# Patient Record
Sex: FEMALE | Race: OTHER | NOT HISPANIC OR LATINO | URBAN - METROPOLITAN AREA
[De-identification: names, ages, dates, MRNs, and addresses within clinical notes are randomized per-mention and may not be internally consistent; named-entity substitution may affect disease eponyms.]

---

## 2021-11-27 ENCOUNTER — EMERGENCY (EMERGENCY)
Facility: HOSPITAL | Age: 26
LOS: 1 days | Discharge: HOME | End: 2021-11-27
Attending: EMERGENCY MEDICINE
Payer: SELF-PAY

## 2021-11-27 PROCEDURE — 99053 MED SERV 10PM-8AM 24 HR FAC: CPT

## 2021-11-27 PROCEDURE — 99285 EMERGENCY DEPT VISIT HI MDM: CPT

## 2021-11-28 VITALS
DIASTOLIC BLOOD PRESSURE: 73 MMHG | OXYGEN SATURATION: 100 % | TEMPERATURE: 98 F | HEART RATE: 90 BPM | RESPIRATION RATE: 17 BRPM | SYSTOLIC BLOOD PRESSURE: 110 MMHG

## 2021-11-28 VITALS
DIASTOLIC BLOOD PRESSURE: 81 MMHG | WEIGHT: 108.03 LBS | TEMPERATURE: 99 F | OXYGEN SATURATION: 100 % | HEIGHT: 56 IN | RESPIRATION RATE: 16 BRPM | HEART RATE: 127 BPM | SYSTOLIC BLOOD PRESSURE: 121 MMHG

## 2021-11-28 LAB
ALBUMIN SERPL ELPH-MCNC: 3.3 G/DL — LOW (ref 3.5–5)
ALP SERPL-CCNC: 78 U/L — SIGNIFICANT CHANGE UP (ref 40–120)
ALT FLD-CCNC: 22 U/L DA — SIGNIFICANT CHANGE UP (ref 10–60)
ANION GAP SERPL CALC-SCNC: 6 MMOL/L — SIGNIFICANT CHANGE UP (ref 5–17)
AST SERPL-CCNC: 17 U/L — SIGNIFICANT CHANGE UP (ref 10–40)
BASOPHILS # BLD AUTO: 0.1 K/UL — SIGNIFICANT CHANGE UP (ref 0–0.2)
BASOPHILS NFR BLD AUTO: 0.4 % — SIGNIFICANT CHANGE UP (ref 0–2)
BILIRUB SERPL-MCNC: 0.2 MG/DL — SIGNIFICANT CHANGE UP (ref 0.2–1.2)
BUN SERPL-MCNC: 21 MG/DL — HIGH (ref 7–18)
CALCIUM SERPL-MCNC: 8.8 MG/DL — SIGNIFICANT CHANGE UP (ref 8.4–10.5)
CHLORIDE SERPL-SCNC: 109 MMOL/L — HIGH (ref 96–108)
CO2 SERPL-SCNC: 25 MMOL/L — SIGNIFICANT CHANGE UP (ref 22–31)
CREAT SERPL-MCNC: 0.72 MG/DL — SIGNIFICANT CHANGE UP (ref 0.5–1.3)
EOSINOPHIL # BLD AUTO: 0.04 K/UL — SIGNIFICANT CHANGE UP (ref 0–0.5)
EOSINOPHIL NFR BLD AUTO: 0.2 % — SIGNIFICANT CHANGE UP (ref 0–6)
GLUCOSE SERPL-MCNC: 115 MG/DL — HIGH (ref 70–99)
HCG SERPL-ACNC: <1 MIU/ML — SIGNIFICANT CHANGE UP
HCT VFR BLD CALC: 35.8 % — SIGNIFICANT CHANGE UP (ref 34.5–45)
HGB BLD-MCNC: 11.6 G/DL — SIGNIFICANT CHANGE UP (ref 11.5–15.5)
IMM GRANULOCYTES NFR BLD AUTO: 0.6 % — SIGNIFICANT CHANGE UP (ref 0–1.5)
LACTATE SERPL-SCNC: 0.8 MMOL/L — SIGNIFICANT CHANGE UP (ref 0.7–2)
LIDOCAIN IGE QN: 64 U/L — LOW (ref 73–393)
LYMPHOCYTES # BLD AUTO: 1.31 K/UL — SIGNIFICANT CHANGE UP (ref 1–3.3)
LYMPHOCYTES # BLD AUTO: 5.9 % — LOW (ref 13–44)
MCHC RBC-ENTMCNC: 25.2 PG — LOW (ref 27–34)
MCHC RBC-ENTMCNC: 32.4 GM/DL — SIGNIFICANT CHANGE UP (ref 32–36)
MCV RBC AUTO: 77.8 FL — LOW (ref 80–100)
MONOCYTES # BLD AUTO: 0.68 K/UL — SIGNIFICANT CHANGE UP (ref 0–0.9)
MONOCYTES NFR BLD AUTO: 3.1 % — SIGNIFICANT CHANGE UP (ref 2–14)
NEUTROPHILS # BLD AUTO: 19.99 K/UL — HIGH (ref 1.8–7.4)
NEUTROPHILS NFR BLD AUTO: 89.8 % — HIGH (ref 43–77)
NRBC # BLD: 0 /100 WBCS — SIGNIFICANT CHANGE UP (ref 0–0)
PLATELET # BLD AUTO: 375 K/UL — SIGNIFICANT CHANGE UP (ref 150–400)
POTASSIUM SERPL-MCNC: 3.7 MMOL/L — SIGNIFICANT CHANGE UP (ref 3.5–5.3)
POTASSIUM SERPL-SCNC: 3.7 MMOL/L — SIGNIFICANT CHANGE UP (ref 3.5–5.3)
PROT SERPL-MCNC: 7.9 G/DL — SIGNIFICANT CHANGE UP (ref 6–8.3)
RBC # BLD: 4.6 M/UL — SIGNIFICANT CHANGE UP (ref 3.8–5.2)
RBC # FLD: 13.8 % — SIGNIFICANT CHANGE UP (ref 10.3–14.5)
SODIUM SERPL-SCNC: 140 MMOL/L — SIGNIFICANT CHANGE UP (ref 135–145)
WBC # BLD: 22.25 K/UL — HIGH (ref 3.8–10.5)
WBC # FLD AUTO: 22.25 K/UL — HIGH (ref 3.8–10.5)

## 2021-11-28 PROCEDURE — 74177 CT ABD & PELVIS W/CONTRAST: CPT | Mod: MA

## 2021-11-28 PROCEDURE — 36415 COLL VENOUS BLD VENIPUNCTURE: CPT

## 2021-11-28 PROCEDURE — 74177 CT ABD & PELVIS W/CONTRAST: CPT | Mod: 26,MA

## 2021-11-28 PROCEDURE — 83690 ASSAY OF LIPASE: CPT

## 2021-11-28 PROCEDURE — 84702 CHORIONIC GONADOTROPIN TEST: CPT

## 2021-11-28 PROCEDURE — 99284 EMERGENCY DEPT VISIT MOD MDM: CPT | Mod: 25

## 2021-11-28 PROCEDURE — 96374 THER/PROPH/DIAG INJ IV PUSH: CPT | Mod: XU

## 2021-11-28 PROCEDURE — 96375 TX/PRO/DX INJ NEW DRUG ADDON: CPT

## 2021-11-28 PROCEDURE — 80053 COMPREHEN METABOLIC PANEL: CPT

## 2021-11-28 PROCEDURE — 85025 COMPLETE CBC W/AUTO DIFF WBC: CPT

## 2021-11-28 PROCEDURE — 83605 ASSAY OF LACTIC ACID: CPT

## 2021-11-28 RX ORDER — SODIUM CHLORIDE 9 MG/ML
1000 INJECTION INTRAMUSCULAR; INTRAVENOUS; SUBCUTANEOUS ONCE
Refills: 0 | Status: COMPLETED | OUTPATIENT
Start: 2021-11-28 | End: 2021-11-28

## 2021-11-28 RX ORDER — FAMOTIDINE 10 MG/ML
20 INJECTION INTRAVENOUS ONCE
Refills: 0 | Status: COMPLETED | OUTPATIENT
Start: 2021-11-28 | End: 2021-11-28

## 2021-11-28 RX ORDER — KETOROLAC TROMETHAMINE 30 MG/ML
15 SYRINGE (ML) INJECTION ONCE
Refills: 0 | Status: DISCONTINUED | OUTPATIENT
Start: 2021-11-28 | End: 2021-11-28

## 2021-11-28 RX ORDER — ONDANSETRON 8 MG/1
4 TABLET, FILM COATED ORAL ONCE
Refills: 0 | Status: COMPLETED | OUTPATIENT
Start: 2021-11-28 | End: 2021-11-28

## 2021-11-28 RX ADMIN — Medication 15 MILLIGRAM(S): at 00:50

## 2021-11-28 RX ADMIN — FAMOTIDINE 20 MILLIGRAM(S): 10 INJECTION INTRAVENOUS at 00:51

## 2021-11-28 RX ADMIN — Medication 30 MILLILITER(S): at 00:50

## 2021-11-28 RX ADMIN — Medication 15 MILLIGRAM(S): at 00:51

## 2021-11-28 RX ADMIN — ONDANSETRON 4 MILLIGRAM(S): 8 TABLET, FILM COATED ORAL at 00:51

## 2021-11-28 RX ADMIN — SODIUM CHLORIDE 1000 MILLILITER(S): 9 INJECTION INTRAMUSCULAR; INTRAVENOUS; SUBCUTANEOUS at 00:51

## 2021-11-28 NOTE — ED PROVIDER NOTE - CROS ED NEURO ALL NEG
- - - O-L Flap Text: The defect edges were debeveled with a #15 scalpel blade.  Given the location of the defect, shape of the defect and the proximity to free margins an O-L flap was deemed most appropriate.  Using a sterile surgical marker, an appropriate advancement flap was drawn incorporating the defect and placing the expected incisions within the relaxed skin tension lines where possible.    The area thus outlined was incised deep to adipose tissue with a #15 scalpel blade.  The skin margins were undermined to an appropriate distance in all directions utilizing iris scissors.

## 2021-11-28 NOTE — ED PROVIDER NOTE - PROGRESS NOTE DETAILS
courtney: improve. lab elevated wbc - ct ordered- no acute findings. toleratign po now   dx gastroenteritis. slowly advance diet. replace loss with gatorade/coconut water. left ambulatory.  return precautions given.

## 2021-11-28 NOTE — ED PROVIDER NOTE - OBJECTIVE STATEMENT
26 year old female with no significant PMHx or PSHx presents to the ED with complaints of diffuse abdominal pain, nausea, vomiting, and diarrhea after having some leftover food tonight. Patient endorses that the food was very spicy. Patient endorses developing a headache after vomiting. Patient otherwise denies all other acute complaints including any visual changes, focal weakness, dysuria, or fevers.   Allergies: Tylenol (palpitations)

## 2021-11-28 NOTE — ED PROVIDER NOTE - PATIENT PORTAL LINK FT
You can access the FollowMyHealth Patient Portal offered by [facility] by registering at the following website: [email or web address]. By joining [employer]’s FollowMyHealth portal, you will also be able to view your health information using other applications (apps) compatible with our system.

## 2022-02-23 ENCOUNTER — EMERGENCY (EMERGENCY)
Facility: HOSPITAL | Age: 27
LOS: 1 days | Discharge: HOME | End: 2022-02-23
Attending: EMERGENCY MEDICINE
Payer: MEDICAID

## 2022-02-23 VITALS
OXYGEN SATURATION: 100 % | HEART RATE: 74 BPM | DIASTOLIC BLOOD PRESSURE: 61 MMHG | WEIGHT: 109.57 LBS | TEMPERATURE: 98 F | HEIGHT: 56 IN | RESPIRATION RATE: 18 BRPM | SYSTOLIC BLOOD PRESSURE: 112 MMHG

## 2022-02-23 PROCEDURE — 99283 EMERGENCY DEPT VISIT LOW MDM: CPT

## 2022-02-23 PROCEDURE — 99282 EMERGENCY DEPT VISIT SF MDM: CPT

## 2022-02-23 RX ORDER — HYDROCORTISONE 1 %
1 OINTMENT (GRAM) TOPICAL
Qty: 33 | Refills: 0
Start: 2022-02-23

## 2022-02-23 NOTE — ED PROVIDER NOTE - CARE PROVIDERS DIRECT ADDRESSES
[email or web address]

## 2022-02-23 NOTE — ED PROVIDER NOTE - NSFOLLOWUPINSTRUCTIONS_ED_ALL_ED_FT
Please follow up with a dermatologist in 1-2 days.  Please use the ointment as prescribed.  Please return to the emergency department if you have vomiting, fever, shortness of breath, or any other symptoms.      Acute Rash    WHAT YOU NEED TO KNOW:    A rash is irritated, red, or itchy skin or mucus membranes, such as the lining of your nose or throat. Acute means the rash starts suddenly, worsens quickly, and lasts a short time. Common causes include a disease or infection, a reaction to something you are allergic to, or certain medicines.    DISCHARGE INSTRUCTIONS:    Return to the emergency department if:   •You have sudden trouble breathing or chest pain.  •You are vomiting, have a headache or muscle aches, and your throat hurts.    Call your doctor or dermatologist if:   •You have a fever.  •You get open wounds from scratching your skin, or you have a wound that is red, swollen, or painful.  •Your rash lasts longer than 3 months.  •You have swelling or pain in your joints.  •You have questions or concerns about your condition or care.    Medicines: If your rash does not go away on its own, you may need the following medicines:  •Antihistamines may be given to help decrease itching.  •Steroids may be given to decrease inflammation.  •Antibiotics help fight or prevent a bacterial infection.  •Take your medicine as directed. Contact your healthcare provider if you think your medicine is not helping or if you have side effects. Tell him of her if you are allergic to any medicine. Keep a list of the medicines, vitamins, and herbs you take. Include the amounts, and when and why you take them. Bring the list or the pill bottles to follow-up visits. Carry your medicine list with you in case of an emergency.    Prevent a rash or care for your skin when you have a rash: Dry skin can lead to more problems. Do not scratch your skin if it itches. You may cause a skin infection by scratching. The following may prevent dry skin, and help your skin look better:  •Help soothe your rash. Apply thick cream lotions or petroleum jelly. Cool compresses may also soothe your skin. Apply a cool compress or a cool, wet towel, and then cover it with a dry towel.  •Use lukewarm water when you bathe. Hot water may damage your skin more. Pat your skin dry. Do not rub your skin with a towel.  •Use detergents, soaps, shampoos, and bubble baths made for sensitive skin.  •Wear clothes made of cotton instead of nylon or wool. Cotton is softer, so it will not hurt your skin as much.    Follow up with your healthcare providers as directed: A dermatologist may help find the cause of your rash or help plan or change treatment. A dietitian may help with meal planning if you have a food allergy. Write down your questions so you remember to ask them during your visits.

## 2022-02-23 NOTE — ED PROVIDER NOTE - OBJECTIVE STATEMENT
26yoF prev healthy presents with generalized itchy rash to body x 1 wk. Tried OTC clotrimazole on her own decision, without improvement. Has been by boyfriend a few days prior to onset and has cat, slept in same bed x 1. Boyfriend does not have rash. Denies all other symptoms including ST, vaginal discharge, urinary symptoms, fever, cough, SOB. Denies other new exposures/detergents.

## 2022-02-23 NOTE — ED PROVIDER NOTE - PATIENT PORTAL LINK FT
You can access the FollowMyHealth Patient Portal offered by [facility] by registering at the following website: [email or web address]. By joining [employer]’s FollowMyHealth portal, you will also be able to view your health information using other applications (apps) compatible with our system.

## 2022-02-23 NOTE — ED PROVIDER NOTE - CARE PROVIDER_API CALL
Margaret Rivera)  Dermatology [facility]  [address] [place] [address]  Phone: [phone]  Fax: [phone]  Follow Up Time: 1-3 Days

## 2022-02-23 NOTE — ED PROVIDER NOTE - CLINICAL SUMMARY MEDICAL DECISION MAKING FREE TEXT BOX
Appearance low suspicion for bedbugs or scabies. Low suspicion for sexual or  etiology. Appearance c/w dermatitis. Patient is well appearing, NAD, afebrile, hemodynamically stable. Discharged with rx hydrocortisone, instructions in further symptomatic care, return precautions, and need for derm f/u.

## 2022-02-27 ENCOUNTER — EMERGENCY (EMERGENCY)
Facility: HOSPITAL | Age: 27
LOS: 1 days | Discharge: HOME | End: 2022-02-27
Attending: EMERGENCY MEDICINE
Payer: MEDICAID

## 2022-02-27 VITALS
SYSTOLIC BLOOD PRESSURE: 122 MMHG | TEMPERATURE: 98 F | OXYGEN SATURATION: 100 % | DIASTOLIC BLOOD PRESSURE: 77 MMHG | HEART RATE: 88 BPM | RESPIRATION RATE: 19 BRPM | WEIGHT: 108.91 LBS | HEIGHT: 56 IN

## 2022-02-27 PROCEDURE — 99283 EMERGENCY DEPT VISIT LOW MDM: CPT

## 2022-02-27 NOTE — ED PROVIDER NOTE - PATIENT PORTAL LINK FT
You can access the FollowMyHealth Patient Portal offered by [facility] by registering at the following website: [email or web address]. By joining [employer]’s FollowMyHealth portal, you will also be able to view your health information using other applications (apps) compatible with our system.

## 2022-02-27 NOTE — ED PROVIDER NOTE - PHYSICAL EXAMINATION
Afebrile, hemodynamically stable, saturating well  NAD, well appearing, sitting comfortably in chair, no WOB, speaking full sentences  Head NCAT  EOMI grossly  Breathing comfortably on RA  AAO, CN's 3-12 grossly intact  PONCE spontaneously, no leg cyanosis or edema  Skin warm, dry, slight punctate raised papular skin-color irritation to skin with excoriations to arms and legs

## 2022-02-27 NOTE — ED PROVIDER NOTE - CLINICAL SUMMARY MEDICAL DECISION MAKING FREE TEXT BOX
No fever or systemic signs. No e/o cellulitis. Appearance c/w dermatitis and improves with topical steroids. Will increase to more potent steroid and pt with take PO steroid prn only if does not improve. Patient is well appearing, NAD, afebrile, hemodynamically stable. Discharged with instructions in further symptomatic care, return precautions, and need for derm f/u.

## 2022-02-27 NOTE — ED ADULT NURSE NOTE - NSIMPLEMENTINTERV_GEN_ALL_ED
Implemented All Universal Safety Interventions:  [place] to call system. Call bell, personal items and telephone within reach. Instruct patient to call for assistance. Room bathroom lighting operational. Non-slip footwear when patient is off stretcher. Physically safe environment: no spills, clutter or unnecessary equipment. Stretcher in lowest position, wheels locked, appropriate side rails in place.

## 2022-02-27 NOTE — ED ADULT NURSE NOTE - CHIEF COMPLAINT QUOTE
BUMPS AND DISCOLORATION TO BODY FOR 2 WEEKS WAS HERE ON WEDNESDAY SEEN AND DISCHARGE WITH CREAM AS PER PATIENT NO RELIEF, RECENTLY RECEIVED 2 DOSE PFIZER

## 2022-02-27 NOTE — ED PROVIDER NOTE - NSFOLLOWUPINSTRUCTIONS_ED_ALL_ED_FT
Please follow up with your primary care doctor in 1-2 days.  Please use the triamcinolone as needed for rash, and the prednisone by mouth only if the ointment is not helping.  Please return to the emergency department if you have severe worsening rash, fever, dizziness, shortness of breath, or any other symptoms.    Acute Rash    WHAT YOU NEED TO KNOW:    A rash is irritated, red, or itchy skin or mucus membranes, such as the lining of your nose or throat. Acute means the rash starts suddenly, worsens quickly, and lasts a short time. Common causes include a disease or infection, a reaction to something you are allergic to, or certain medicines.    DISCHARGE INSTRUCTIONS:    Return to the emergency department if:   •You have sudden trouble breathing or chest pain.  •You are vomiting, have a headache or muscle aches, and your throat hurts.    Call your doctor or dermatologist if:   •You have a fever.  •You get open wounds from scratching your skin, or you have a wound that is red, swollen, or painful.  •Your rash lasts longer than 3 months.  •You have swelling or pain in your joints.  •You have questions or concerns about your condition or care.    Medicines: If your rash does not go away on its own, you may need the following medicines:  •Antihistamines may be given to help decrease itching.  •Steroids may be given to decrease inflammation.  •Antibiotics help fight or prevent a bacterial infection.  •Take your medicine as directed. Contact your healthcare provider if you think your medicine is not helping or if you have side effects. Tell him of her if you are allergic to any medicine. Keep a list of the medicines, vitamins, and herbs you take. Include the amounts, and when and why you take them. Bring the list or the pill bottles to follow-up visits. Carry your medicine list with you in case of an emergency.    Prevent a rash or care for your skin when you have a rash: Dry skin can lead to more problems. Do not scratch your skin if it itches. You may cause a skin infection by scratching. The following may prevent dry skin, and help your skin look better:  •Help soothe your rash. Apply thick cream lotions or petroleum jelly. Cool compresses may also soothe your skin. Apply a cool compress or a cool, wet towel, and then cover it with a dry towel.  •Use lukewarm water when you bathe. Hot water may damage your skin more. Pat your skin dry. Do not rub your skin with a towel.  •Use detergents, soaps, shampoos, and bubble baths made for sensitive skin.  •Wear clothes made of cotton instead of nylon or wool. Cotton is softer, so it will not hurt your skin as much.    Follow up with your healthcare providers as directed: A dermatologist may help find the cause of your rash or help plan or change treatment. A dietitian may help with meal planning if you have a food allergy. Write down your questions so you remember to ask them during your visits.

## 2022-02-27 NOTE — ED PROVIDER NOTE - OBJECTIVE STATEMENT
26yoF prev healthy presents with generalized itchy rash to body x 1.5 wks. Tried OTC clotrimazole on her own decision, without improvement, then 3 days of hydrocortisone with improvement when it is placed on but then recurds. Has been by boyfriend a few days prior to onset and has cat, slept in same bed x 1. Boyfriend does not have rash. Denies all other symptoms including ST, vaginal discharge, urinary symptoms, fever, cough, SOB. Denies other new exposures/detergents.

## 2022-03-09 ENCOUNTER — EMERGENCY (EMERGENCY)
Facility: HOSPITAL | Age: 27
LOS: 1 days | Discharge: HOME | End: 2022-03-09
Attending: EMERGENCY MEDICINE
Payer: MEDICAID

## 2022-03-09 VITALS
SYSTOLIC BLOOD PRESSURE: 118 MMHG | RESPIRATION RATE: 20 BRPM | HEART RATE: 98 BPM | HEIGHT: 56 IN | WEIGHT: 108.91 LBS | OXYGEN SATURATION: 100 % | DIASTOLIC BLOOD PRESSURE: 73 MMHG | TEMPERATURE: 98 F

## 2022-03-09 VITALS — SYSTOLIC BLOOD PRESSURE: 121 MMHG | DIASTOLIC BLOOD PRESSURE: 69 MMHG | HEART RATE: 87 BPM

## 2022-03-09 LAB
ALBUMIN SERPL ELPH-MCNC: 3.4 G/DL — LOW (ref 3.5–5)
ALP SERPL-CCNC: 91 U/L — SIGNIFICANT CHANGE UP (ref 40–120)
ALT FLD-CCNC: 29 U/L DA — SIGNIFICANT CHANGE UP (ref 10–60)
ANION GAP SERPL CALC-SCNC: 3 MMOL/L — LOW (ref 5–17)
APPEARANCE UR: CLEAR — SIGNIFICANT CHANGE UP
AST SERPL-CCNC: 15 U/L — SIGNIFICANT CHANGE UP (ref 10–40)
BACTERIA # UR AUTO: ABNORMAL /HPF
BILIRUB SERPL-MCNC: 0.3 MG/DL — SIGNIFICANT CHANGE UP (ref 0.2–1.2)
BILIRUB UR-MCNC: NEGATIVE — SIGNIFICANT CHANGE UP
BUN SERPL-MCNC: 18 MG/DL — SIGNIFICANT CHANGE UP (ref 7–18)
CALCIUM SERPL-MCNC: 8.8 MG/DL — SIGNIFICANT CHANGE UP (ref 8.4–10.5)
CHLORIDE SERPL-SCNC: 108 MMOL/L — SIGNIFICANT CHANGE UP (ref 96–108)
CO2 SERPL-SCNC: 26 MMOL/L — SIGNIFICANT CHANGE UP (ref 22–31)
COLOR SPEC: YELLOW — SIGNIFICANT CHANGE UP
CREAT SERPL-MCNC: 0.66 MG/DL — SIGNIFICANT CHANGE UP (ref 0.5–1.3)
DIFF PNL FLD: ABNORMAL
EGFR: 123 ML/MIN/1.73M2 — SIGNIFICANT CHANGE UP
EPI CELLS # UR: ABNORMAL /HPF
GLUCOSE SERPL-MCNC: 88 MG/DL — SIGNIFICANT CHANGE UP (ref 70–99)
GLUCOSE UR QL: NEGATIVE — SIGNIFICANT CHANGE UP
HCG SERPL-ACNC: 2272 MIU/ML — HIGH
HCG UR QL: POSITIVE
HCT VFR BLD CALC: 36 % — SIGNIFICANT CHANGE UP (ref 34.5–45)
HGB BLD-MCNC: 11.9 G/DL — SIGNIFICANT CHANGE UP (ref 11.5–15.5)
KETONES UR-MCNC: NEGATIVE — SIGNIFICANT CHANGE UP
LEUKOCYTE ESTERASE UR-ACNC: NEGATIVE — SIGNIFICANT CHANGE UP
MCHC RBC-ENTMCNC: 25.3 PG — LOW (ref 27–34)
MCHC RBC-ENTMCNC: 33.1 GM/DL — SIGNIFICANT CHANGE UP (ref 32–36)
MCV RBC AUTO: 76.4 FL — LOW (ref 80–100)
NITRITE UR-MCNC: NEGATIVE — SIGNIFICANT CHANGE UP
NRBC # BLD: 0 /100 WBCS — SIGNIFICANT CHANGE UP (ref 0–0)
PH UR: 7 — SIGNIFICANT CHANGE UP (ref 5–8)
PLATELET # BLD AUTO: 379 K/UL — SIGNIFICANT CHANGE UP (ref 150–400)
POTASSIUM SERPL-MCNC: 3.8 MMOL/L — SIGNIFICANT CHANGE UP (ref 3.5–5.3)
POTASSIUM SERPL-SCNC: 3.8 MMOL/L — SIGNIFICANT CHANGE UP (ref 3.5–5.3)
PROT SERPL-MCNC: 8 G/DL — SIGNIFICANT CHANGE UP (ref 6–8.3)
PROT UR-MCNC: NEGATIVE — SIGNIFICANT CHANGE UP
RBC # BLD: 4.71 M/UL — SIGNIFICANT CHANGE UP (ref 3.8–5.2)
RBC # FLD: 14.4 % — SIGNIFICANT CHANGE UP (ref 10.3–14.5)
RBC CASTS # UR COMP ASSIST: ABNORMAL /HPF (ref 0–2)
SODIUM SERPL-SCNC: 137 MMOL/L — SIGNIFICANT CHANGE UP (ref 135–145)
SP GR SPEC: 1.01 — SIGNIFICANT CHANGE UP (ref 1.01–1.02)
UROBILINOGEN FLD QL: 1
WBC # BLD: 14.55 K/UL — HIGH (ref 3.8–10.5)
WBC # FLD AUTO: 14.55 K/UL — HIGH (ref 3.8–10.5)
WBC UR QL: SIGNIFICANT CHANGE UP /HPF (ref 0–5)

## 2022-03-09 PROCEDURE — 99284 EMERGENCY DEPT VISIT MOD MDM: CPT | Mod: 25

## 2022-03-09 PROCEDURE — 76801 OB US < 14 WKS SINGLE FETUS: CPT

## 2022-03-09 PROCEDURE — 76817 TRANSVAGINAL US OBSTETRIC: CPT

## 2022-03-09 PROCEDURE — 76815 OB US LIMITED FETUS(S): CPT | Mod: 26

## 2022-03-09 PROCEDURE — 84702 CHORIONIC GONADOTROPIN TEST: CPT

## 2022-03-09 PROCEDURE — 99285 EMERGENCY DEPT VISIT HI MDM: CPT

## 2022-03-09 PROCEDURE — 81001 URINALYSIS AUTO W/SCOPE: CPT

## 2022-03-09 PROCEDURE — 81025 URINE PREGNANCY TEST: CPT

## 2022-03-09 PROCEDURE — 80053 COMPREHEN METABOLIC PANEL: CPT

## 2022-03-09 PROCEDURE — 76817 TRANSVAGINAL US OBSTETRIC: CPT | Mod: 26

## 2022-03-09 PROCEDURE — 36415 COLL VENOUS BLD VENIPUNCTURE: CPT

## 2022-03-09 PROCEDURE — 85027 COMPLETE CBC AUTOMATED: CPT

## 2022-03-09 PROCEDURE — 93975 VASCULAR STUDY: CPT | Mod: 26

## 2022-03-09 PROCEDURE — 87086 URINE CULTURE/COLONY COUNT: CPT

## 2022-03-09 PROCEDURE — 93975 VASCULAR STUDY: CPT

## 2022-03-09 RX ORDER — IBUPROFEN 200 MG
600 TABLET ORAL ONCE
Refills: 0 | Status: DISCONTINUED | OUTPATIENT
Start: 2022-03-09 | End: 2022-03-09

## 2022-03-09 NOTE — ED PROVIDER NOTE - OBJECTIVE STATEMENT
26 year old female no past medical history presents for left sided back pain and left lower abd pain x3 days.  States started after laying down.  States LMP last month but only for 2 days, states that she normally gets irregular periods.  Patient denies any headache, dizziness, lightheadedness, fainting, chest pain, palpitations, shortness of breath, nausea, vomiting, diarrhea, constipation,, fevers, chills, urinary symptoms. Patient denies heavy lifting or trauma.  Patient denies numbness or tingling in extremities, denies loss of bladder of bowel function, denies fevers or chills and denies saddle anesthesia.  Is able to ambulate. 26 year old female no past medical history presents for left sided back pain and left lower abd pain x3 days.  States started after laying down.  States LMP last month but only for 2 days starting 2/2/22, states that she normally gets irregular periods.  Patient denies any headache, dizziness, lightheadedness, fainting, chest pain, palpitations, shortness of breath, nausea, vomiting, diarrhea, constipation,, fevers, chills, urinary symptoms. Patient denies heavy lifting or trauma.  Patient denies numbness or tingling in extremities, denies loss of bladder of bowel function, denies fevers or chills and denies saddle anesthesia.  Is able to ambulate.

## 2022-03-09 NOTE — ED PROVIDER NOTE - CLINICAL SUMMARY MEDICAL DECISION MAKING FREE TEXT BOX
26 year old female no past medical history presents for left sided back pain and left lower abd pain x3 days.  Will check urine, provide PO meds, reassess.

## 2022-03-09 NOTE — ED PROVIDER NOTE - NSFOLLOWUPINSTRUCTIONS_ED_ALL_ED_FT
Please call your primary doctor to inform them of this ER visit and obtain the next available appointment within the next 5 days. As we discussed, return to the ER if you have any worsening symptoms. - Bring copies of your results.    We no longer feel that you need further emergency care or admission to the hospital at this time.    While we have determined that you are currently stable for discharge, we know that things can change. Please seek immediate medical attention or return to the ER if you experience any of the following:  Any worsening or persistent symptoms  Severe Pain  Chest Pain  Difficulty Breathing  Bleeding  Passing Out  Severe Rash  Inability to Eat or Drink  Persistent Fever    Please see a primary care doctor or specialist within 5 days to ensure that you are improving.    Please call the [facility] phone numbers on this document if you have any problems obtaining a follow up appointment.

## 2022-03-09 NOTE — ED PROVIDER NOTE - PATIENT PORTAL LINK FT
You can access the FollowMyHealth Patient Portal offered by [facility] by registering at the following website: [email or web address]. By joining [employer]’s FollowMyHealth portal, you will also be able to view your health information using other applications (apps) compatible with our system.

## 2022-03-09 NOTE — ED PROVIDER NOTE - PHYSICAL EXAMINATION
GEN:   comfortable, in no apparent distress, AOx3  EYES:   PERRL, extra-occular movements intact  HEENT:   airway patent, moist mucosal membranes, uvula midline  RESP:   clear to auscultation bilaterally, non-labored, speaking in full sentences  ABD:   soft, mild LLQ TTP, no guarding  :   no cva tenderness  MSK:  5/5 strength, moving all extremities, left lumbar paraspinal TTP  SKIN:   dry, intact, no rash  NEURO:   AOx3, no focal weakness or loss of sensation, gait normal, GCS 15  PSYCH: calm, cooperative, no apparent risk to self and others

## 2022-03-09 NOTE — ED PROVIDER NOTE - ATTENDING CONTRIBUTION TO CARE
I was physically present for the E/M service provided. I agree with above history, physical, and plan which I have reviewed and edited where appropriate. I was physically present for the key portions of the service provided.    Till[place]: 26 yr old female with hx of irregular periods presents to ed c/o Left lower lumbar pain and left sided abd pain x 3 days. worse with movement. no changes with food, no fever, no n/v, no dysuria, no heavy lifting, no diarrhea, rashes.    *GEN:   comfortable, in no apparent distress, AOx3  *CV:   regular rate and rhythm, normal S1/S2, no murmur  *RESP:   clear to auscultation bilaterally, non-labored, speaking in full sentences  *ABD:   soft, non tender, no guarding  *:   no cva tenderness  *MSK:   + musculoskeletal tenderness - left lateral lumbar no underlying skin changes, 5/5 strength, moving all extremity  *SKIN:   dry, intact, no rash  *NEURO:   AOx3, no focal weakness or loss of sensation, gait normal, GCS 15    a/p: back strain without red flags. abd exam completely benign. possibly abd gas vs mild gastritis. maalox and motrin, upreg, ua.

## 2022-03-09 NOTE — ED PROVIDER NOTE - PROGRESS NOTE DETAILS
Patient informed of results, that she is pregnant.  Counseling provided.  Patient to f/u next week with OBGYN for repeat US / labs and if unable to, will return to the ED and patient agreeable.  Patient nontoxic and medically stable for discharge Return precautions provided and patient understands to return to the ED for concerning or worsening signs and symptoms. Patient's questions answered.

## 2022-03-09 NOTE — ED ADULT NURSE NOTE - OBJECTIVE STATEMENT
Pt. c/o left sided back pain and abdominal pain that started 3 days ago. LMP is 2/2/22 that lasted only 2 days.

## 2022-03-11 LAB
CULTURE RESULTS: NO GROWTH — SIGNIFICANT CHANGE UP
SPECIMEN SOURCE: SIGNIFICANT CHANGE UP

## 2022-03-21 ENCOUNTER — EMERGENCY (EMERGENCY)
Facility: HOSPITAL | Age: 27
LOS: 1 days | Discharge: HOME | End: 2022-03-21
Attending: STUDENT IN AN ORGANIZED HEALTH CARE EDUCATION/TRAINING PROGRAM
Payer: MEDICAID

## 2022-03-21 VITALS
SYSTOLIC BLOOD PRESSURE: 111 MMHG | OXYGEN SATURATION: 100 % | DIASTOLIC BLOOD PRESSURE: 72 MMHG | WEIGHT: 108.03 LBS | HEART RATE: 79 BPM | RESPIRATION RATE: 18 BRPM | TEMPERATURE: 98 F | HEIGHT: 56 IN

## 2022-03-21 LAB
ALBUMIN SERPL ELPH-MCNC: 3.3 G/DL — LOW (ref 3.5–5)
ALP SERPL-CCNC: 76 U/L — SIGNIFICANT CHANGE UP (ref 40–120)
ALT FLD-CCNC: 19 U/L DA — SIGNIFICANT CHANGE UP (ref 10–60)
ANION GAP SERPL CALC-SCNC: 7 MMOL/L — SIGNIFICANT CHANGE UP (ref 5–17)
APPEARANCE UR: CLEAR — SIGNIFICANT CHANGE UP
AST SERPL-CCNC: 13 U/L — SIGNIFICANT CHANGE UP (ref 10–40)
BASOPHILS # BLD AUTO: 0.05 K/UL — SIGNIFICANT CHANGE UP (ref 0–0.2)
BASOPHILS NFR BLD AUTO: 0.4 % — SIGNIFICANT CHANGE UP (ref 0–2)
BILIRUB SERPL-MCNC: 0.3 MG/DL — SIGNIFICANT CHANGE UP (ref 0.2–1.2)
BILIRUB UR-MCNC: NEGATIVE — SIGNIFICANT CHANGE UP
BLD GP AB SCN SERPL QL: SIGNIFICANT CHANGE UP
BUN SERPL-MCNC: 12 MG/DL — SIGNIFICANT CHANGE UP (ref 7–18)
CALCIUM SERPL-MCNC: 8.8 MG/DL — SIGNIFICANT CHANGE UP (ref 8.4–10.5)
CHLORIDE SERPL-SCNC: 108 MMOL/L — SIGNIFICANT CHANGE UP (ref 96–108)
CO2 SERPL-SCNC: 21 MMOL/L — LOW (ref 22–31)
COLOR SPEC: YELLOW — SIGNIFICANT CHANGE UP
CREAT SERPL-MCNC: 0.52 MG/DL — SIGNIFICANT CHANGE UP (ref 0.5–1.3)
DIFF PNL FLD: NEGATIVE — SIGNIFICANT CHANGE UP
EGFR: 131 ML/MIN/1.73M2 — SIGNIFICANT CHANGE UP
EOSINOPHIL # BLD AUTO: 0.21 K/UL — SIGNIFICANT CHANGE UP (ref 0–0.5)
EOSINOPHIL NFR BLD AUTO: 1.7 % — SIGNIFICANT CHANGE UP (ref 0–6)
GLUCOSE SERPL-MCNC: 78 MG/DL — SIGNIFICANT CHANGE UP (ref 70–99)
GLUCOSE UR QL: NEGATIVE — SIGNIFICANT CHANGE UP
HCG SERPL-ACNC: HIGH MIU/ML
HCG UR QL: POSITIVE
HCT VFR BLD CALC: 36.2 % — SIGNIFICANT CHANGE UP (ref 34.5–45)
HGB BLD-MCNC: 12 G/DL — SIGNIFICANT CHANGE UP (ref 11.5–15.5)
IMM GRANULOCYTES NFR BLD AUTO: 0.4 % — SIGNIFICANT CHANGE UP (ref 0–1.5)
KETONES UR-MCNC: ABNORMAL
LEUKOCYTE ESTERASE UR-ACNC: NEGATIVE — SIGNIFICANT CHANGE UP
LIDOCAIN IGE QN: 75 U/L — SIGNIFICANT CHANGE UP (ref 73–393)
LYMPHOCYTES # BLD AUTO: 25.5 % — SIGNIFICANT CHANGE UP (ref 13–44)
LYMPHOCYTES # BLD AUTO: 3.14 K/UL — SIGNIFICANT CHANGE UP (ref 1–3.3)
MCHC RBC-ENTMCNC: 25.5 PG — LOW (ref 27–34)
MCHC RBC-ENTMCNC: 33.1 GM/DL — SIGNIFICANT CHANGE UP (ref 32–36)
MCV RBC AUTO: 76.9 FL — LOW (ref 80–100)
MONOCYTES # BLD AUTO: 0.62 K/UL — SIGNIFICANT CHANGE UP (ref 0–0.9)
MONOCYTES NFR BLD AUTO: 5 % — SIGNIFICANT CHANGE UP (ref 2–14)
NEUTROPHILS # BLD AUTO: 8.24 K/UL — HIGH (ref 1.8–7.4)
NEUTROPHILS NFR BLD AUTO: 67 % — SIGNIFICANT CHANGE UP (ref 43–77)
NITRITE UR-MCNC: NEGATIVE — SIGNIFICANT CHANGE UP
NRBC # BLD: 0 /100 WBCS — SIGNIFICANT CHANGE UP (ref 0–0)
PH UR: 6.5 — SIGNIFICANT CHANGE UP (ref 5–8)
PLATELET # BLD AUTO: 357 K/UL — SIGNIFICANT CHANGE UP (ref 150–400)
POTASSIUM SERPL-MCNC: 3.7 MMOL/L — SIGNIFICANT CHANGE UP (ref 3.5–5.3)
POTASSIUM SERPL-SCNC: 3.7 MMOL/L — SIGNIFICANT CHANGE UP (ref 3.5–5.3)
PROT SERPL-MCNC: 7.8 G/DL — SIGNIFICANT CHANGE UP (ref 6–8.3)
PROT UR-MCNC: NEGATIVE — SIGNIFICANT CHANGE UP
RBC # BLD: 4.71 M/UL — SIGNIFICANT CHANGE UP (ref 3.8–5.2)
RBC # FLD: 13.8 % — SIGNIFICANT CHANGE UP (ref 10.3–14.5)
SODIUM SERPL-SCNC: 136 MMOL/L — SIGNIFICANT CHANGE UP (ref 135–145)
SP GR SPEC: 1.01 — SIGNIFICANT CHANGE UP (ref 1.01–1.02)
UROBILINOGEN FLD QL: NEGATIVE — SIGNIFICANT CHANGE UP
WBC # BLD: 12.31 K/UL — HIGH (ref 3.8–10.5)
WBC # FLD AUTO: 12.31 K/UL — HIGH (ref 3.8–10.5)

## 2022-03-21 PROCEDURE — 76801 OB US < 14 WKS SINGLE FETUS: CPT | Mod: 26

## 2022-03-21 PROCEDURE — 96374 THER/PROPH/DIAG INJ IV PUSH: CPT

## 2022-03-21 PROCEDURE — 86900 BLOOD TYPING SEROLOGIC ABO: CPT

## 2022-03-21 PROCEDURE — 83690 ASSAY OF LIPASE: CPT

## 2022-03-21 PROCEDURE — 80053 COMPREHEN METABOLIC PANEL: CPT

## 2022-03-21 PROCEDURE — 76801 OB US < 14 WKS SINGLE FETUS: CPT

## 2022-03-21 PROCEDURE — 76817 TRANSVAGINAL US OBSTETRIC: CPT

## 2022-03-21 PROCEDURE — 99284 EMERGENCY DEPT VISIT MOD MDM: CPT | Mod: 25

## 2022-03-21 PROCEDURE — 81003 URINALYSIS AUTO W/O SCOPE: CPT

## 2022-03-21 PROCEDURE — 99285 EMERGENCY DEPT VISIT HI MDM: CPT

## 2022-03-21 PROCEDURE — 86901 BLOOD TYPING SEROLOGIC RH(D): CPT

## 2022-03-21 PROCEDURE — 86850 RBC ANTIBODY SCREEN: CPT

## 2022-03-21 PROCEDURE — 36415 COLL VENOUS BLD VENIPUNCTURE: CPT

## 2022-03-21 PROCEDURE — 96375 TX/PRO/DX INJ NEW DRUG ADDON: CPT

## 2022-03-21 PROCEDURE — 76817 TRANSVAGINAL US OBSTETRIC: CPT | Mod: 26

## 2022-03-21 PROCEDURE — 81025 URINE PREGNANCY TEST: CPT

## 2022-03-21 PROCEDURE — 85025 COMPLETE CBC W/AUTO DIFF WBC: CPT

## 2022-03-21 PROCEDURE — 84702 CHORIONIC GONADOTROPIN TEST: CPT

## 2022-03-21 RX ORDER — SODIUM CHLORIDE 9 MG/ML
1000 INJECTION INTRAMUSCULAR; INTRAVENOUS; SUBCUTANEOUS ONCE
Refills: 0 | Status: COMPLETED | OUTPATIENT
Start: 2022-03-21 | End: 2022-03-21

## 2022-03-21 RX ORDER — FAMOTIDINE 10 MG/ML
20 INJECTION INTRAVENOUS ONCE
Refills: 0 | Status: COMPLETED | OUTPATIENT
Start: 2022-03-21 | End: 2022-03-21

## 2022-03-21 RX ORDER — ONDANSETRON 8 MG/1
4 TABLET, FILM COATED ORAL ONCE
Refills: 0 | Status: COMPLETED | OUTPATIENT
Start: 2022-03-21 | End: 2022-03-21

## 2022-03-21 RX ADMIN — ONDANSETRON 4 MILLIGRAM(S): 8 TABLET, FILM COATED ORAL at 14:57

## 2022-03-21 RX ADMIN — SODIUM CHLORIDE 2000 MILLILITER(S): 9 INJECTION INTRAMUSCULAR; INTRAVENOUS; SUBCUTANEOUS at 15:16

## 2022-03-21 RX ADMIN — FAMOTIDINE 20 MILLIGRAM(S): 10 INJECTION INTRAVENOUS at 14:57

## 2022-03-21 NOTE — ED PROVIDER NOTE - PROGRESS NOTE DETAILS
Patient reports improvement.  Workup unremarkable. Pt is well appearing walking with steady gait, stable for discharge and follow up without fail with medical doctor. I had a detailed discussion with the patient and/or guardian regarding the historical points, exam findings, and any diagnostic results supporting the discharge diagnosis. Pt educated on care and need for follow up. Strict return instructions and red flag signs and symptoms discussed with patient. Questions answered. Pt shows understanding of discharge information and agrees to follow.

## 2022-03-21 NOTE — ED PROVIDER NOTE - ATTENDING CONTRIBUTION TO CARE
25 y/o female, no past history, , last menstrual period 22, presents for N/V/D x 1-2 days. Also vaginal spotting. No abd pain. Pt well appearing, abd completely benign. Low suspicion for surgical GI pathology. Likely gastroenteritis. Will r/o ectopic with HCG/TVUS and reassess after meds/fluids.

## 2022-03-21 NOTE — ED PROVIDER NOTE - NSFOLLOWUPINSTRUCTIONS_ED_ALL_ED_FT
Gastritis    Gastritis is soreness and swelling (inflammation) of the lining of the stomach. Gastritis can develop as a sudden onset (acute) or long-term (chronic) condition. If gastritis is not treated, it can lead to stomach bleeding and ulcers. Causes include viral and bacterial infections, excessive alcohol consumption, tobacco use, or certain medications. Symptoms include nausea, vomiting, or abdominal pain or burning especially after eating. Avoid foods or drinks that make your symptoms worse such as caffeine, chocolate, spicy foods, acidic foods, or alcohol.    SEEK IMMEDIATE MEDICAL CARE IF YOU HAVE ANY OF THE FOLLOWING SYMPTOMS: black or bloody stools, blood or coffee-ground-colored vomitus, worsening abdominal pain, fever, or inability to keep fluids down.     Threatened Miscarriage    A threatened miscarriage is the term for vaginal bleeding during your first 20 weeks of pregnancy but the pregnancy has not ended. If you have vaginal bleeding during this time, your health care provider will do tests to check if you are still pregnant. If the tests show you are still pregnant and the developing baby (fetus) inside your womb (uterus) is still growing, your condition is considered a threatened miscarriage. A threatened miscarriage does not mean your pregnancy will end, but it does increase the risk of losing your pregnancy. It is important to have close follow up with your obstetrician.    SEEK IMMEDIATE MEDICAL CARE IF YOU HAVE ANY OF THE FOLLOWING SYMPTOMS: heavy vaginal bleeding, severe low back or abdominal cramps, fever/chills, or lightheadedness/dizziness.

## 2022-03-21 NOTE — ED PROVIDER NOTE - PATIENT PORTAL LINK FT
You can access the FollowMyHealth Patient Portal offered by [facility] by registering at the following website: [email or web address]. By joining [employer]’s FollowMyHealth portal, you will also be able to view your health information using other applications (apps) compatible with our system. You can access the FollowMyHealth Patient Portal offered by [facility] by registering at the following website: [email or web address]. By joining [employer]’s FollowMyHealth portal, you will also be able to view your health information using other applications (apps) compatible with our system.

## 2022-03-21 NOTE — ED ADULT NURSE NOTE - OBJECTIVE STATEMENT
pt is a 27 y/o female with c/o  vaginal bleeding  pt states she is 28 weeks  pregnant  and  she is spotting

## 2022-03-21 NOTE — ED ADULT TRIAGE NOTE - HEIGHT IN INCHES
Antonia Guzman is a 47 year old male presenting with 2 week follow up .    Medication verified, no changes  Patient would like communication of their results via: [employer]    Cell Phone:   Telephone Information:   Mobile [phone]     Okay to leave a message containing results? Yes    Health Maintenance Due   Topic Date Due   • Influenza Vaccine (1) 09/01/2021     Patient is due for topics as listed above but is not proceeding with Immunization(s) Influenza at this time.     MD to discuss  top[ics with pt .       8

## 2022-03-21 NOTE — ED PROVIDER NOTE - OBJECTIVE STATEMENT
25 y/o female, no past history, , last menstrual period 22, presents for gastroenteritis as well as vaginal bleeding. Patient states that her GI symptoms started yesterday afternoon. States that she had vomiting and diarrhea. Noticed a couple of streaks of blood in her vomiting. Took Pepto-Bismol and noticed, last night, she had an episode of vaginal bleeding. Patient also endorses some left lower abdominal cramping. Denies fevers. NKDA.

## 2022-03-21 NOTE — ED PROVIDER NOTE - CLINICAL SUMMARY MEDICAL DECISION MAKING FREE TEXT BOX
25 y/o female, , approximately in early pregnancy, unsure how many weeks, presents for likely viral gastroenteritis as well as episode of vaginal bleeding and cramping. Will check labs, provide IV fluids, meds, and do pelvic ultrasound.

## 2022-03-21 NOTE — ED ADULT NURSE NOTE - NSIMPLEMENTINTERV_GEN_ALL_ED
Implemented All Universal Safety Interventions:  [place] to call system. Call bell, personal items and telephone within reach. Instruct patient to call for assistance. Room bathroom lighting operational. Non-slip footwear when patient is off stretcher. Physically safe environment: no spills, clutter or unnecessary equipment. Stretcher in lowest position, wheels locked, appropriate side rails in place.

## 2022-04-11 ENCOUNTER — EMERGENCY (EMERGENCY)
Facility: HOSPITAL | Age: 27
LOS: 1 days | Discharge: HOME | End: 2022-04-11
Attending: EMERGENCY MEDICINE
Payer: MEDICAID

## 2022-04-11 VITALS
HEART RATE: 106 BPM | DIASTOLIC BLOOD PRESSURE: 56 MMHG | SYSTOLIC BLOOD PRESSURE: 113 MMHG | OXYGEN SATURATION: 100 % | WEIGHT: 108.91 LBS | HEIGHT: 56 IN | TEMPERATURE: 99 F | RESPIRATION RATE: 18 BRPM

## 2022-04-11 LAB
ACETONE SERPL-MCNC: NEGATIVE — SIGNIFICANT CHANGE UP
ALBUMIN SERPL ELPH-MCNC: 3.7 G/DL — SIGNIFICANT CHANGE UP (ref 3.5–5)
ALP SERPL-CCNC: 66 U/L — SIGNIFICANT CHANGE UP (ref 40–120)
ALT FLD-CCNC: 22 U/L DA — SIGNIFICANT CHANGE UP (ref 10–60)
ANION GAP SERPL CALC-SCNC: 10 MMOL/L — SIGNIFICANT CHANGE UP (ref 5–17)
APPEARANCE UR: CLEAR — SIGNIFICANT CHANGE UP
AST SERPL-CCNC: 15 U/L — SIGNIFICANT CHANGE UP (ref 10–40)
BASOPHILS # BLD AUTO: 0.05 K/UL — SIGNIFICANT CHANGE UP (ref 0–0.2)
BASOPHILS NFR BLD AUTO: 0.4 % — SIGNIFICANT CHANGE UP (ref 0–2)
BILIRUB SERPL-MCNC: 0.3 MG/DL — SIGNIFICANT CHANGE UP (ref 0.2–1.2)
BILIRUB UR-MCNC: NEGATIVE — SIGNIFICANT CHANGE UP
BUN SERPL-MCNC: 13 MG/DL — SIGNIFICANT CHANGE UP (ref 7–18)
CALCIUM SERPL-MCNC: 9.4 MG/DL — SIGNIFICANT CHANGE UP (ref 8.4–10.5)
CHLORIDE SERPL-SCNC: 108 MMOL/L — SIGNIFICANT CHANGE UP (ref 96–108)
CO2 SERPL-SCNC: 20 MMOL/L — LOW (ref 22–31)
COLOR SPEC: YELLOW — SIGNIFICANT CHANGE UP
CREAT SERPL-MCNC: 0.55 MG/DL — SIGNIFICANT CHANGE UP (ref 0.5–1.3)
DIFF PNL FLD: NEGATIVE — SIGNIFICANT CHANGE UP
EGFR: 129 ML/MIN/1.73M2 — SIGNIFICANT CHANGE UP
EOSINOPHIL # BLD AUTO: 0.01 K/UL — SIGNIFICANT CHANGE UP (ref 0–0.5)
EOSINOPHIL NFR BLD AUTO: 0.1 % — SIGNIFICANT CHANGE UP (ref 0–6)
GLUCOSE SERPL-MCNC: 78 MG/DL — SIGNIFICANT CHANGE UP (ref 70–99)
GLUCOSE UR QL: NEGATIVE — SIGNIFICANT CHANGE UP
HCG SERPL-ACNC: HIGH MIU/ML
HCT VFR BLD CALC: 34.3 % — LOW (ref 34.5–45)
HGB BLD-MCNC: 11.6 G/DL — SIGNIFICANT CHANGE UP (ref 11.5–15.5)
IMM GRANULOCYTES NFR BLD AUTO: 0.4 % — SIGNIFICANT CHANGE UP (ref 0–1.5)
KETONES UR-MCNC: ABNORMAL
LEUKOCYTE ESTERASE UR-ACNC: NEGATIVE — SIGNIFICANT CHANGE UP
LIDOCAIN IGE QN: 58 U/L — LOW (ref 73–393)
LYMPHOCYTES # BLD AUTO: 1.73 K/UL — SIGNIFICANT CHANGE UP (ref 1–3.3)
LYMPHOCYTES # BLD AUTO: 12.7 % — LOW (ref 13–44)
MCHC RBC-ENTMCNC: 26.1 PG — LOW (ref 27–34)
MCHC RBC-ENTMCNC: 33.8 GM/DL — SIGNIFICANT CHANGE UP (ref 32–36)
MCV RBC AUTO: 77.3 FL — LOW (ref 80–100)
MONOCYTES # BLD AUTO: 0.32 K/UL — SIGNIFICANT CHANGE UP (ref 0–0.9)
MONOCYTES NFR BLD AUTO: 2.4 % — SIGNIFICANT CHANGE UP (ref 2–14)
NEUTROPHILS # BLD AUTO: 11.41 K/UL — HIGH (ref 1.8–7.4)
NEUTROPHILS NFR BLD AUTO: 84 % — HIGH (ref 43–77)
NITRITE UR-MCNC: NEGATIVE — SIGNIFICANT CHANGE UP
NRBC # BLD: 0 /100 WBCS — SIGNIFICANT CHANGE UP (ref 0–0)
PH UR: 5 — SIGNIFICANT CHANGE UP (ref 5–8)
PLATELET # BLD AUTO: 328 K/UL — SIGNIFICANT CHANGE UP (ref 150–400)
POTASSIUM SERPL-MCNC: 4 MMOL/L — SIGNIFICANT CHANGE UP (ref 3.5–5.3)
POTASSIUM SERPL-SCNC: 4 MMOL/L — SIGNIFICANT CHANGE UP (ref 3.5–5.3)
PROT SERPL-MCNC: 7.6 G/DL — SIGNIFICANT CHANGE UP (ref 6–8.3)
PROT UR-MCNC: NEGATIVE — SIGNIFICANT CHANGE UP
RBC # BLD: 4.44 M/UL — SIGNIFICANT CHANGE UP (ref 3.8–5.2)
RBC # FLD: 13.8 % — SIGNIFICANT CHANGE UP (ref 10.3–14.5)
SODIUM SERPL-SCNC: 138 MMOL/L — SIGNIFICANT CHANGE UP (ref 135–145)
SP GR SPEC: 1.02 — SIGNIFICANT CHANGE UP (ref 1.01–1.02)
UROBILINOGEN FLD QL: NEGATIVE — SIGNIFICANT CHANGE UP
WBC # BLD: 13.57 K/UL — HIGH (ref 3.8–10.5)
WBC # FLD AUTO: 13.57 K/UL — HIGH (ref 3.8–10.5)

## 2022-04-11 PROCEDURE — 80053 COMPREHEN METABOLIC PANEL: CPT

## 2022-04-11 PROCEDURE — 85025 COMPLETE CBC W/AUTO DIFF WBC: CPT

## 2022-04-11 PROCEDURE — 99284 EMERGENCY DEPT VISIT MOD MDM: CPT | Mod: 25

## 2022-04-11 PROCEDURE — 84702 CHORIONIC GONADOTROPIN TEST: CPT

## 2022-04-11 PROCEDURE — 96374 THER/PROPH/DIAG INJ IV PUSH: CPT

## 2022-04-11 PROCEDURE — 87086 URINE CULTURE/COLONY COUNT: CPT

## 2022-04-11 PROCEDURE — 99284 EMERGENCY DEPT VISIT MOD MDM: CPT

## 2022-04-11 PROCEDURE — 81003 URINALYSIS AUTO W/O SCOPE: CPT

## 2022-04-11 PROCEDURE — 82009 KETONE BODYS QUAL: CPT

## 2022-04-11 PROCEDURE — 36415 COLL VENOUS BLD VENIPUNCTURE: CPT

## 2022-04-11 PROCEDURE — 83690 ASSAY OF LIPASE: CPT

## 2022-04-11 RX ORDER — ONDANSETRON 8 MG/1
4 TABLET, FILM COATED ORAL ONCE
Refills: 0 | Status: COMPLETED | OUTPATIENT
Start: 2022-04-11 | End: 2022-04-11

## 2022-04-11 RX ORDER — SODIUM CHLORIDE 9 MG/ML
1000 INJECTION INTRAMUSCULAR; INTRAVENOUS; SUBCUTANEOUS ONCE
Refills: 0 | Status: COMPLETED | OUTPATIENT
Start: 2022-04-11 | End: 2022-04-11

## 2022-04-11 RX ADMIN — ONDANSETRON 4 MILLIGRAM(S): 8 TABLET, FILM COATED ORAL at 22:35

## 2022-04-11 RX ADMIN — SODIUM CHLORIDE 1000 MILLILITER(S): 9 INJECTION INTRAMUSCULAR; INTRAVENOUS; SUBCUTANEOUS at 22:34

## 2022-04-11 NOTE — ED ADULT NURSE NOTE - CAS ELECT INFOMATION PROVIDED
Follow-up with OBGYN Doctor and Primary Care Provider. Return to the ED for new or worsening symptoms./DC instructions

## 2022-04-11 NOTE — ED ADULT TRIAGE NOTE - ACCOMPANIED BY
Spouse/Significant other 77yo f PMH including HLD, GERD, Anxiety, Anemia, CAD s/p HERBERT, severe AS (s/p TAVR & PPM 12/17 [employer] [id number]), h/o?Mech MVR , PMR on chronic steroids, asthma, OA, s/p TKR with recent joint infection s/p explant and abx spacer on 12/23/17, + rehab when had RLE DVT (dvt proph was held 2nd nose bleed) on Coumadin s/p 3/23/2018 Right knee explantation of previously placed articulating antibiotic spacer, irrigation and debridement of the knee, placement of static antibiotic spacer, with a plastic surgery soft tissue complex wound closure, presents for fever. cultures NTD. fever resolved.     # Recent septic Rt TKR - abx spacer exchanged and plastics complex wound closure  # PMR on chronic steroids  # thigh wound,   # right knee eschar, right thigh encrosis and abd wound skin breakdown    Plan:  appreciate plastics discussion with family, cont local wound care as family declining surgery  sp multiple debridements, cont wet to dry dressing abd and knee wounds TID  possible vac for thigh wound  remains afebrile, cont suppressive minocycline, appreciate ID recs  appreciate psych eval  appreciate ortho recs  will cont coumadin and asa  pain control on oxy IR 10mg BID and dilaudid 2mg q4 prn  PT OOB encourage ambulation  bowel regimen    plan of care dw patient

## 2022-04-12 VITALS
DIASTOLIC BLOOD PRESSURE: 58 MMHG | OXYGEN SATURATION: 97 % | SYSTOLIC BLOOD PRESSURE: 105 MMHG | HEART RATE: 69 BPM | TEMPERATURE: 98 F | RESPIRATION RATE: 18 BRPM

## 2022-04-12 LAB
CULTURE RESULTS: SIGNIFICANT CHANGE UP
SPECIMEN SOURCE: SIGNIFICANT CHANGE UP

## 2022-04-12 RX ORDER — ONDANSETRON 8 MG/1
1 TABLET, FILM COATED ORAL
Qty: 15 | Refills: 0
Start: 2022-04-12 | End: 2022-04-16

## 2022-04-12 RX ORDER — ONDANSETRON 8 MG/1
1 TABLET, FILM COATED ORAL
Qty: 15 | Refills: 0
Start: 2022-04-12 | End: 2022-05-14

## 2022-04-12 NOTE — ED PROVIDER NOTE - NSFOLLOWUPINSTRUCTIONS_ED_ALL_ED_FT
Keep yourself well-hydrated with plenty of fluids.  Followup with PMD/OBGYN for reevaluation.  Return to ED if you develop fever, worsening abdominal pain, vomiting/unable to keep down fluids.      Gastroenteritis    WHAT YOU NEED TO KNOW:    Gastroenteritis, or stomach flu, is an infection of the stomach and intestines.   Digestive Tract         DISCHARGE INSTRUCTIONS:    Call 911 for any of the following:   •You have trouble breathing or a very fast pulse.          Return to the emergency department if:   •You see blood in your diarrhea.      •You cannot stop vomiting.      •You have not urinated for 12 hours.       •You feel like you are going to faint.      Contact your healthcare provider if:   •You have a fever.      •You continue to vomit or have diarrhea, even after treatment.      •You see worms in your diarrhea.      •Your mouth or eyes are dry. You are not urinating as much or as often.      •You have questions or concerns about your condition or care.      Medicines:   •Medicines may be given to stop vomiting or diarrhea, decrease abdominal cramps, or treat an infection.      •Take your medicine as directed. Contact your healthcare provider if you think your medicine is not helping or if you have side effects. Tell him or her if you are allergic to any medicine. Keep a list of the medicines, vitamins, and herbs you take. Include the amounts, and when and why you take them. Bring the list or the pill bottles to follow-up visits. Carry your medicine list with you in case of an emergency.      Manage your symptoms:   •Drink liquids as directed. Ask your healthcare provider how much liquid to drink each day, and which liquids are best for you. You may also need to drink an oral rehydration solution (ORS). An ORS has the right amounts of sugar, salt, and minerals in water to replace body fluids.      •Eat bland foods. When you feel hungry, begin eating soft, bland foods. Examples are bananas, clear soup, potatoes, and applesauce. Do not have dairy products, alcohol, sugary drinks, or drinks with caffeine until you feel better.      •Rest as much as possible. Slowly start to do more each day when you begin to feel better.      Prevent the spread of gastroenteritis: Gastroenteritis can spread easily. Keep yourself, your family, and your surroundings clean to help prevent the spread of gastroenteritis:   •Wash your hands often. Use soap and water. Wash your hands after you use the bathroom, change a child's diapers, or sneeze. Wash your hands before you prepare or eat food.   Handwashing           •Clean surfaces and do laundry often. Wash your clothes and towels separately from the rest of the laundry. Clean surfaces in your home with antibacterial  or bleach.      •Clean food thoroughly and cook safely. Wash raw vegetables before you cook. Cook meat, fish, and eggs fully. Do not use the same dishes for raw meat as you do for other foods. Refrigerate any leftover food immediately.      •Be aware when you camp or travel. Drink only clean water. Do not drink from rivers or lakes unless you purify or boil the water first. When you travel, drink bottled water and do not add ice. Do not eat fruit that has not been peeled. Do not eat raw fish or meat that is not fully cooked.       Follow up with your doctor as directed: Write down your questions so you remember to ask them during your visits.

## 2022-04-12 NOTE — ED PROVIDER NOTE - CLINICAL SUMMARY MEDICAL DECISION MAKING FREE TEXT BOX
26 year old female currently at 10 weeks gestation with no significant PMHx or PSHx presents to the ED with complaints of vomiting and diarrhea for two days. Labs show an WBC of 13k and unremarkable CMP. Patient is tolerating po, and is stable for discharge. 26 year old female currently at 10 weeks gestation with no significant PMHx or PSHx presents to the ED with complaints of vomiting and diarrhea for two days. Labs show an WBC of 13k and unremarkable CMP. Patient is tolerating po, and is stable for discharge.  of note patient had recent pelvic US confirming IUP.

## 2022-04-12 NOTE — ED PROVIDER NOTE - NSICDXPASTMEDICALHX_GEN_ALL_CORE_FT
PAST MEDICAL HISTORY:  Gastroenteritis      1, currently pregnant     No pertinent past medical history

## 2022-04-12 NOTE — ED PROVIDER NOTE - OBJECTIVE STATEMENT
26 year old female currently at 10 weeks gestation with no significant PMHx or PSHx presents to the ED with complaints of vomiting and diarrhea for two days. Patient reports having more than ten episodes of nonbloody emesis and nonbloody diarrhea in total, and additionally reports mild abdominal discomfort. Patient denies any fevers, cough, vaginal bleeding or discharge, dysuria, hematuria, sick contacts, or recent travel. Patient reports that she did not eat anything unusual prior to onset of her symptoms.   Allergies: Tylenol (palpitations)

## 2022-04-12 NOTE — ED PROVIDER NOTE - PATIENT PORTAL LINK FT
You can access the FollowMyHealth Patient Portal offered by [facility] by registering at the following website: [email or web address]. By joining [employer]’s FollowMyHealth portal, you will also be able to view your health information using other applications (apps) compatible with our system.

## 2022-04-26 ENCOUNTER — EMERGENCY (EMERGENCY)
Facility: HOSPITAL | Age: 27
LOS: 1 days | Discharge: HOME | End: 2022-04-26
Attending: EMERGENCY MEDICINE
Payer: MEDICAID

## 2022-04-26 VITALS
SYSTOLIC BLOOD PRESSURE: 104 MMHG | RESPIRATION RATE: 18 BRPM | DIASTOLIC BLOOD PRESSURE: 70 MMHG | HEIGHT: 56 IN | OXYGEN SATURATION: 100 % | HEART RATE: 73 BPM | TEMPERATURE: 98 F

## 2022-04-26 LAB
ACETONE SERPL-MCNC: ABNORMAL
ALBUMIN SERPL ELPH-MCNC: 3.4 G/DL — LOW (ref 3.5–5)
ALP SERPL-CCNC: 61 U/L — SIGNIFICANT CHANGE UP (ref 40–120)
ALT FLD-CCNC: 23 U/L DA — SIGNIFICANT CHANGE UP (ref 10–60)
ANION GAP SERPL CALC-SCNC: 8 MMOL/L — SIGNIFICANT CHANGE UP (ref 5–17)
APPEARANCE UR: CLEAR — SIGNIFICANT CHANGE UP
APTT BLD: 35.3 SEC — SIGNIFICANT CHANGE UP (ref 27.5–35.5)
AST SERPL-CCNC: 17 U/L — SIGNIFICANT CHANGE UP (ref 10–40)
BASOPHILS # BLD AUTO: 0.04 K/UL — SIGNIFICANT CHANGE UP (ref 0–0.2)
BASOPHILS NFR BLD AUTO: 0.4 % — SIGNIFICANT CHANGE UP (ref 0–2)
BILIRUB SERPL-MCNC: 0.3 MG/DL — SIGNIFICANT CHANGE UP (ref 0.2–1.2)
BILIRUB UR-MCNC: NEGATIVE — SIGNIFICANT CHANGE UP
BUN SERPL-MCNC: 12 MG/DL — SIGNIFICANT CHANGE UP (ref 7–18)
CALCIUM SERPL-MCNC: 9.2 MG/DL — SIGNIFICANT CHANGE UP (ref 8.4–10.5)
CHLORIDE SERPL-SCNC: 108 MMOL/L — SIGNIFICANT CHANGE UP (ref 96–108)
CO2 SERPL-SCNC: 22 MMOL/L — SIGNIFICANT CHANGE UP (ref 22–31)
COLOR SPEC: YELLOW — SIGNIFICANT CHANGE UP
CREAT SERPL-MCNC: 0.58 MG/DL — SIGNIFICANT CHANGE UP (ref 0.5–1.3)
DIFF PNL FLD: NEGATIVE — SIGNIFICANT CHANGE UP
EGFR: 127 ML/MIN/1.73M2 — SIGNIFICANT CHANGE UP
EOSINOPHIL # BLD AUTO: 0.11 K/UL — SIGNIFICANT CHANGE UP (ref 0–0.5)
EOSINOPHIL NFR BLD AUTO: 1 % — SIGNIFICANT CHANGE UP (ref 0–6)
GLUCOSE SERPL-MCNC: 70 MG/DL — SIGNIFICANT CHANGE UP (ref 70–99)
GLUCOSE UR QL: NEGATIVE — SIGNIFICANT CHANGE UP
HCG SERPL-ACNC: HIGH MIU/ML
HCT VFR BLD CALC: 35.2 % — SIGNIFICANT CHANGE UP (ref 34.5–45)
HGB BLD-MCNC: 11.8 G/DL — SIGNIFICANT CHANGE UP (ref 11.5–15.5)
IMM GRANULOCYTES NFR BLD AUTO: 0.3 % — SIGNIFICANT CHANGE UP (ref 0–1.5)
INR BLD: 1.12 RATIO — SIGNIFICANT CHANGE UP (ref 0.88–1.16)
KETONES UR-MCNC: ABNORMAL
LEUKOCYTE ESTERASE UR-ACNC: NEGATIVE — SIGNIFICANT CHANGE UP
LIDOCAIN IGE QN: 79 U/L — SIGNIFICANT CHANGE UP (ref 73–393)
LYMPHOCYTES # BLD AUTO: 2.87 K/UL — SIGNIFICANT CHANGE UP (ref 1–3.3)
LYMPHOCYTES # BLD AUTO: 27.2 % — SIGNIFICANT CHANGE UP (ref 13–44)
MCHC RBC-ENTMCNC: 26.2 PG — LOW (ref 27–34)
MCHC RBC-ENTMCNC: 33.5 GM/DL — SIGNIFICANT CHANGE UP (ref 32–36)
MCV RBC AUTO: 78.2 FL — LOW (ref 80–100)
MONOCYTES # BLD AUTO: 0.51 K/UL — SIGNIFICANT CHANGE UP (ref 0–0.9)
MONOCYTES NFR BLD AUTO: 4.8 % — SIGNIFICANT CHANGE UP (ref 2–14)
NEUTROPHILS # BLD AUTO: 6.98 K/UL — SIGNIFICANT CHANGE UP (ref 1.8–7.4)
NEUTROPHILS NFR BLD AUTO: 66.3 % — SIGNIFICANT CHANGE UP (ref 43–77)
NITRITE UR-MCNC: NEGATIVE — SIGNIFICANT CHANGE UP
NRBC # BLD: 0 /100 WBCS — SIGNIFICANT CHANGE UP (ref 0–0)
PH UR: 7 — SIGNIFICANT CHANGE UP (ref 5–8)
PLATELET # BLD AUTO: 331 K/UL — SIGNIFICANT CHANGE UP (ref 150–400)
POTASSIUM SERPL-MCNC: 3.6 MMOL/L — SIGNIFICANT CHANGE UP (ref 3.5–5.3)
POTASSIUM SERPL-SCNC: 3.6 MMOL/L — SIGNIFICANT CHANGE UP (ref 3.5–5.3)
PROT SERPL-MCNC: 7.8 G/DL — SIGNIFICANT CHANGE UP (ref 6–8.3)
PROT UR-MCNC: NEGATIVE — SIGNIFICANT CHANGE UP
PROTHROM AB SERPL-ACNC: 13.3 SEC — SIGNIFICANT CHANGE UP (ref 10.5–13.4)
RBC # BLD: 4.5 M/UL — SIGNIFICANT CHANGE UP (ref 3.8–5.2)
RBC # FLD: 13.6 % — SIGNIFICANT CHANGE UP (ref 10.3–14.5)
SODIUM SERPL-SCNC: 138 MMOL/L — SIGNIFICANT CHANGE UP (ref 135–145)
SP GR SPEC: 1.01 — SIGNIFICANT CHANGE UP (ref 1.01–1.02)
UROBILINOGEN FLD QL: 1
WBC # BLD: 10.54 K/UL — HIGH (ref 3.8–10.5)
WBC # FLD AUTO: 10.54 K/UL — HIGH (ref 3.8–10.5)

## 2022-04-26 PROCEDURE — 80053 COMPREHEN METABOLIC PANEL: CPT

## 2022-04-26 PROCEDURE — 82009 KETONE BODYS QUAL: CPT

## 2022-04-26 PROCEDURE — 84702 CHORIONIC GONADOTROPIN TEST: CPT

## 2022-04-26 PROCEDURE — 85730 THROMBOPLASTIN TIME PARTIAL: CPT

## 2022-04-26 PROCEDURE — 81003 URINALYSIS AUTO W/O SCOPE: CPT

## 2022-04-26 PROCEDURE — 96374 THER/PROPH/DIAG INJ IV PUSH: CPT

## 2022-04-26 PROCEDURE — 83690 ASSAY OF LIPASE: CPT

## 2022-04-26 PROCEDURE — 99284 EMERGENCY DEPT VISIT MOD MDM: CPT | Mod: 25

## 2022-04-26 PROCEDURE — 99284 EMERGENCY DEPT VISIT MOD MDM: CPT

## 2022-04-26 PROCEDURE — 85025 COMPLETE CBC W/AUTO DIFF WBC: CPT

## 2022-04-26 PROCEDURE — 36415 COLL VENOUS BLD VENIPUNCTURE: CPT

## 2022-04-26 PROCEDURE — 96361 HYDRATE IV INFUSION ADD-ON: CPT

## 2022-04-26 PROCEDURE — 85610 PROTHROMBIN TIME: CPT

## 2022-04-26 RX ORDER — METOCLOPRAMIDE HCL 10 MG
1 TABLET ORAL
Qty: 21 | Refills: 0
Start: 2022-04-26 | End: 2022-05-02

## 2022-04-26 RX ORDER — SODIUM CHLORIDE 9 MG/ML
1000 INJECTION INTRAMUSCULAR; INTRAVENOUS; SUBCUTANEOUS ONCE
Refills: 0 | Status: COMPLETED | OUTPATIENT
Start: 2022-04-26 | End: 2022-04-26

## 2022-04-26 RX ORDER — ONDANSETRON 8 MG/1
4 TABLET, FILM COATED ORAL ONCE
Refills: 0 | Status: COMPLETED | OUTPATIENT
Start: 2022-04-26 | End: 2022-04-26

## 2022-04-26 RX ADMIN — SODIUM CHLORIDE 1000 MILLILITER(S): 9 INJECTION INTRAMUSCULAR; INTRAVENOUS; SUBCUTANEOUS at 14:16

## 2022-04-26 RX ADMIN — SODIUM CHLORIDE 1000 MILLILITER(S): 9 INJECTION INTRAMUSCULAR; INTRAVENOUS; SUBCUTANEOUS at 17:32

## 2022-04-26 RX ADMIN — ONDANSETRON 4 MILLIGRAM(S): 8 TABLET, FILM COATED ORAL at 14:17

## 2022-04-26 NOTE — ED PROVIDER NOTE - PATIENT PORTAL LINK FT
You can access the FollowMyHealth Patient Portal offered by [facility] by registering at the following website: [email or web address]. By joining [employer]’s FollowMyHealth portal, you will also be able to view your health information using other applications (apps) compatible with our system.

## 2022-04-26 NOTE — ED PROVIDER NOTE - PHYSICAL EXAMINATION
Constitutional: not in any distress   ENT: mucous membranes are soft   Abdominal: belly soft, nontender

## 2022-04-26 NOTE — ED PROVIDER NOTE - OBJECTIVE STATEMENT
27 y/o female, about 12 weeks pregnant, says she has had 3 intermittent episodes of vomiting a lot. This one started about 3 days ago. She cannot hold anything down so she comes in for evaluation. Patient denies any other symptoms. NKDA.

## 2022-04-26 NOTE — ED ADULT TRIAGE NOTE - CHIEF COMPLAINT QUOTE
as per the pt " I have nausea , vomiting for 12 weeks " as per the pt " I have nausea , vomiting and joshua 12 week pregnant "

## 2022-04-26 NOTE — ED PROVIDER NOTE - GASTROINTESTINAL [+], MLM
SUBJECTIVE:  Nursing Notes:   Kareen Abdalla LPN  4/1/2019  9:06 AM  Signed  Patient presents to the clinic today for px.    Medication Reconciliation: complete     Kareen Abdalla LPN.................. 4/1/2019 9:02 AM    Sneha Bassett is a 19 year old female who presents for her annual exam.    HPI: Sneha Bassett is a 19 year old female presents for her annual exam.    She noticed a patch on her face of redness, veiny-ness last month.  She gets lots of facial flushing too.  Recent sunburn on her face too after skiing.    Going to [place] [facility] and [place] this summer.  3 weeks of travel including rural travel and outdoor travel/long time.  This is a school/college-based trip.  Be spending extended periods of time outside, but she does not anticipate being in direct contact with animals.    Last pap: due age 20.    Immunizations: She is up-to-date on MMR, tetanus, hepatitis A, hepatitis B, HPV  Mammogram at age 44  Colon cancer screening at age 49  Current birth control condoms  -she is been with same partner for about 8 months  Would like STD testing done.        PROBLEM LIST:  Patient Active Problem List   Diagnosis     Acne vulgaris     Backache     Chronic abdominal pain     Ganglion cyst of right foot       PAST MEDICAL HISTORY:  Past Medical History:   Diagnosis Date     Abdominal pain     2/22/2016     Chlamydia 2017     SURGICAL HISTORY:  Past Surgical History:   Procedure Laterality Date     TYMPANOSTOMY, LOCAL/TOPICAL ANESTHESIA  2006    1/2006,PE tube placement       SOCIAL HISTORY:  Social History     Socioeconomic History     Marital status: Single     Spouse name: Not on file     Number of children: 0     Years of education: 13     Highest education level: Not on file   Occupational History     Employer: [employer]     Occupation: Student   Social Needs     Financial resource strain: Not on file     Food insecurity:     Worry: Not on file     Inability: Not on file     Transportation  needs:     Medical: Not on file     Non-medical: Not on file   Tobacco Use     Smoking status: Never Smoker     Smokeless tobacco: Never Used   Substance and Sexual Activity     Alcohol use: Yes     Alcohol/week: 0.0 oz     Drug use: No     Comment: Drug use: No     Sexual activity: Yes     Partners: Male     Birth control/protection: Condom   Lifestyle     Physical activity:     Days per week: Not on file     Minutes per session: Not on file     Stress: Not on file   Relationships     Social connections:     Talks on phone: Not on file     Gets together: Not on file     Attends [employer] service: Not on file     Active member of club or organization: Not on file     Attends meetings of clubs or organizations: Not on file     Relationship status: Not on file     Intimate partner violence:     Fear of current or ex partner: Not on file     Emotionally abused: Not on file     Physically abused: Not on file     Forced sexual activity: Not on file   Other Topics Concern     Not on file   Social History Narrative    Parents are .  Attends [employer] - graduating spring 2019.      José Father.  Connie Mother  Gerardo Sister, born 1/03     FAMILY HISTORY:    Family History   Problem Relation Age of Onset     No Known Problems Sister      Heart Disease Paternal Grandmother         Heart Disease,MI.     Thyroid Disease Paternal Grandmother         Thyroid Disease,Thyroid     Heart Disease Paternal Grandfather         Heart Disease,Heart problems.       CURRENT MEDICATIONS:  None   ALLERGIES:   No Known Allergies      REVIEW OF SYSTEMS:  General: tired by 9pm , gets extra tired in the evenings.  Complains of weight gain -drinking more beer  Eyes: denies problems  Ears/Nose/Throat: denies problems, last dentist visit was in October, has appointment later this week.    Respiratory: denies problems  Cardiovascular: denies problems  Gastrointestinal:more heartburn lately   Genitourinary: denies problems - periods are  "very regular    Musculoskeletal: some back pain with her menses   Skin: See above  Neurologic: denies problems  Psychiatric: less angry  Endocrine: denies problems  Heme/Lymphatic: denies problems  Allergic/Immunologic: deniesproblems    PHQ-2 Score:      PHQ-2 Score:     PHQ-2 ( 1999 [employer]) 4/1/2019 8/13/2018   Q1: Little interest or pleasure in doing things 0 0   Q2: Feeling down, depressed or hopeless 0 0   PHQ-2 Score 0 0       PHQ-9 SCORE 7/24/2015 9/15/2016   PHQ-9 Total Score 3 0     SHA-7 SCORE 1/2/2015   Total Score 3               OBJECTIVE:  /80   Pulse 76   Temp 97.9  F (36.6  C) (Tympanic)   Resp 14   Ht 1.651 m (5' 5\")   Wt 63 kg (139 lb)   LMP 03/21/2019   Breastfeeding? No   BMI 23.13 kg/m     Patient's last menstrual period was 03/21/2019.   Wt Readings from Last 4 Encounters:   04/01/19 63 kg (139 lb)   08/13/18 58.6 kg (129 lb 3.2 oz) (53 %)*   08/11/18 57.9 kg (127 lb 9 oz) (50 %)*   06/13/18 61.7 kg (136 lb) (65 %)*     * Growth percentiles are based on CDC (Girls, 2-20 Years) data.         EXAM:   General Appearance: Pleasant, alert, appropriate appearance for age. No acute distress  Head Exam: Normal. Normocephalic, atraumatic.  Eye Exam:Normal external eye, conjunctiva, lids, cornea. LALO.  Ear Exam: Normal TM's bilaterally. Normal auditory canals and external ears. Non-tender.  Nose Exam: Normal external nose, mucus membranes, and septum.  OroPharynx Exam:  Dental hygiene adequate. Normal buccal mucose. Normal pharynx.  Neck Exam:  Supple, no masses or nodes.  Thyroid Exam: No nodules or enlargement.  Chest/Respiratory Exam: Normal chest wall andrespirations. Clear to auscultation.  Cardiovascular Exam: Regular rate and rhythm. S1, S2, no murmur, click, gallop, or rubs.  Gastrointestinal Exam: Soft, non-tender, no masses or organomegaly  Musculoskeletal Exam: Back is straight and non-tender, full ROM of upper and lower extremities.  Skin: Sunburn across her cheeks.  She also " has prominent vessels across her left cheek  Neurologic Exam: Nonfocal, symmetric DTRs, normal gross motor, tone coordination and no tremor.  Psychiatric Exam: Alertand oriented - appropriate affect.    Office Visit on 08/12/2018   Component Date Value Ref Range Status     Specimen Source 08/12/2018 Unspecified Urine   Final     Neisseria gonorrhoreae PCR 08/12/2018 Not Detected  NDET^Not Detected Final    Comment: NOT DETECTED: Negative for N.gonorrhoeae genomic DNA by [facility]   real-time,reverse-transcriptase PCR. A negative result does not preclude the   presence of N.gonorrhoeae infection. The results are dependent on proper   collection,transport,processing of the specimen,and the presence of sufficient   DNA to be detected.       Chlamydia Trachomatis PCR 08/12/2018 Not Detected  NDET^Not Detected Final    Comment: NOTDETECTED: Negative for C.trachomatis genomic DNA by [employer]   real-time,reverse-transcriptase PCR. A negative result does not preclude the   presence of C.trachomatis infection. The results are dependent on proper   collection,transpoet,processing of specimen, and the presence of sufficient   DNA to be detected.         ASSESSMENT/PLAN    ICD-10-CM    1. Physical exam, annual Z00.00 GC/Chlamydia by PCR   2. Travel advice encounter Z71.89 atovaquone-proguanil (MALARONE) 250-100 MG tablet   3. Rosacea L71.9 metroNIDAZOLE (METROGEL) 0.75 % external gel   4. Fatigue, unspecified type R53.83 Hemoglobin     Thyrotropin GH     Thyrotropin GH     Hemoglobin     STD testing/screening obtained today.  2.  With her fatigue, she would like thyroid and hemoglobin testing done.  We discussed other causes of fatigue.  3.  After his sunburn is healed, she is to start topical metronidazole for rosacea symptoms.  Discussed sunscreen, skin cancer prevention.  4.  In discussing upcoming travel advice, her immunizations are reviewed.  She will have typhoid vaccine administered today.  With her areas of travel, malaria  prophylaxis is recommended.  A prescription for Malarone 1 tablet daily starting 2 days before she leaves, continuing a week after she returns is provided for her.  Potential side effects this medication are discussed.  We also discussed mosquito bite/exposure precautions.  Is also recommended for her travel that she receive Japanese encephalitis vaccine and I recommend she contact the travel clinic at [facility] for this.  5.  In discussing weight gain, we discussed current eating habits, also in particular current drinking habits and potential effects on her weight.        Maria Eugenia Fall MD        NAUSEA/VOMITING

## 2022-05-10 ENCOUNTER — EMERGENCY (EMERGENCY)
Facility: HOSPITAL | Age: 27
LOS: 1 days | Discharge: HOME | End: 2022-05-10
Attending: STUDENT IN AN ORGANIZED HEALTH CARE EDUCATION/TRAINING PROGRAM
Payer: MEDICAID

## 2022-05-10 VITALS
HEIGHT: 56 IN | OXYGEN SATURATION: 100 % | DIASTOLIC BLOOD PRESSURE: 72 MMHG | RESPIRATION RATE: 22 BRPM | SYSTOLIC BLOOD PRESSURE: 108 MMHG | TEMPERATURE: 98 F | WEIGHT: 102.07 LBS | HEART RATE: 100 BPM

## 2022-05-10 VITALS
RESPIRATION RATE: 18 BRPM | DIASTOLIC BLOOD PRESSURE: 72 MMHG | HEART RATE: 84 BPM | TEMPERATURE: 98 F | SYSTOLIC BLOOD PRESSURE: 107 MMHG | OXYGEN SATURATION: 100 %

## 2022-05-10 LAB
ALBUMIN SERPL ELPH-MCNC: 3.5 G/DL — SIGNIFICANT CHANGE UP (ref 3.5–5)
ALP SERPL-CCNC: 80 U/L — SIGNIFICANT CHANGE UP (ref 40–120)
ALT FLD-CCNC: 29 U/L DA — SIGNIFICANT CHANGE UP (ref 10–60)
ANION GAP SERPL CALC-SCNC: 9 MMOL/L — SIGNIFICANT CHANGE UP (ref 5–17)
AST SERPL-CCNC: 17 U/L — SIGNIFICANT CHANGE UP (ref 10–40)
BASOPHILS # BLD AUTO: 0.04 K/UL — SIGNIFICANT CHANGE UP (ref 0–0.2)
BASOPHILS NFR BLD AUTO: 0.3 % — SIGNIFICANT CHANGE UP (ref 0–2)
BILIRUB SERPL-MCNC: 0.4 MG/DL — SIGNIFICANT CHANGE UP (ref 0.2–1.2)
BUN SERPL-MCNC: 11 MG/DL — SIGNIFICANT CHANGE UP (ref 7–18)
CALCIUM SERPL-MCNC: 9.5 MG/DL — SIGNIFICANT CHANGE UP (ref 8.4–10.5)
CHLORIDE SERPL-SCNC: 105 MMOL/L — SIGNIFICANT CHANGE UP (ref 96–108)
CO2 SERPL-SCNC: 22 MMOL/L — SIGNIFICANT CHANGE UP (ref 22–31)
CREAT SERPL-MCNC: 0.6 MG/DL — SIGNIFICANT CHANGE UP (ref 0.5–1.3)
EGFR: 126 ML/MIN/1.73M2 — SIGNIFICANT CHANGE UP
EOSINOPHIL # BLD AUTO: 0.04 K/UL — SIGNIFICANT CHANGE UP (ref 0–0.5)
EOSINOPHIL NFR BLD AUTO: 0.3 % — SIGNIFICANT CHANGE UP (ref 0–6)
GLUCOSE SERPL-MCNC: 77 MG/DL — SIGNIFICANT CHANGE UP (ref 70–99)
HCG SERPL-ACNC: HIGH MIU/ML
HCT VFR BLD CALC: 35.3 % — SIGNIFICANT CHANGE UP (ref 34.5–45)
HGB BLD-MCNC: 11.8 G/DL — SIGNIFICANT CHANGE UP (ref 11.5–15.5)
IMM GRANULOCYTES NFR BLD AUTO: 0.4 % — SIGNIFICANT CHANGE UP (ref 0–1.5)
LYMPHOCYTES # BLD AUTO: 14.2 % — SIGNIFICANT CHANGE UP (ref 13–44)
LYMPHOCYTES # BLD AUTO: 2.24 K/UL — SIGNIFICANT CHANGE UP (ref 1–3.3)
MAGNESIUM SERPL-MCNC: 1.9 MG/DL — SIGNIFICANT CHANGE UP (ref 1.6–2.6)
MCHC RBC-ENTMCNC: 26.1 PG — LOW (ref 27–34)
MCHC RBC-ENTMCNC: 33.4 GM/DL — SIGNIFICANT CHANGE UP (ref 32–36)
MCV RBC AUTO: 78.1 FL — LOW (ref 80–100)
MONOCYTES # BLD AUTO: 0.61 K/UL — SIGNIFICANT CHANGE UP (ref 0–0.9)
MONOCYTES NFR BLD AUTO: 3.9 % — SIGNIFICANT CHANGE UP (ref 2–14)
NEUTROPHILS # BLD AUTO: 12.8 K/UL — HIGH (ref 1.8–7.4)
NEUTROPHILS NFR BLD AUTO: 80.9 % — HIGH (ref 43–77)
NRBC # BLD: 0 /100 WBCS — SIGNIFICANT CHANGE UP (ref 0–0)
PLATELET # BLD AUTO: 326 K/UL — SIGNIFICANT CHANGE UP (ref 150–400)
POTASSIUM SERPL-MCNC: 3.8 MMOL/L — SIGNIFICANT CHANGE UP (ref 3.5–5.3)
POTASSIUM SERPL-SCNC: 3.8 MMOL/L — SIGNIFICANT CHANGE UP (ref 3.5–5.3)
PROT SERPL-MCNC: 7.9 G/DL — SIGNIFICANT CHANGE UP (ref 6–8.3)
RBC # BLD: 4.52 M/UL — SIGNIFICANT CHANGE UP (ref 3.8–5.2)
RBC # FLD: 13.5 % — SIGNIFICANT CHANGE UP (ref 10.3–14.5)
SODIUM SERPL-SCNC: 136 MMOL/L — SIGNIFICANT CHANGE UP (ref 135–145)
WBC # BLD: 15.79 K/UL — HIGH (ref 3.8–10.5)
WBC # FLD AUTO: 15.79 K/UL — HIGH (ref 3.8–10.5)

## 2022-05-10 PROCEDURE — 80053 COMPREHEN METABOLIC PANEL: CPT

## 2022-05-10 PROCEDURE — 82962 GLUCOSE BLOOD TEST: CPT

## 2022-05-10 PROCEDURE — 36415 COLL VENOUS BLD VENIPUNCTURE: CPT

## 2022-05-10 PROCEDURE — 99284 EMERGENCY DEPT VISIT MOD MDM: CPT | Mod: 25

## 2022-05-10 PROCEDURE — 96361 HYDRATE IV INFUSION ADD-ON: CPT

## 2022-05-10 PROCEDURE — 83735 ASSAY OF MAGNESIUM: CPT

## 2022-05-10 PROCEDURE — 99284 EMERGENCY DEPT VISIT MOD MDM: CPT

## 2022-05-10 PROCEDURE — 96374 THER/PROPH/DIAG INJ IV PUSH: CPT

## 2022-05-10 PROCEDURE — 85025 COMPLETE CBC W/AUTO DIFF WBC: CPT

## 2022-05-10 PROCEDURE — 96375 TX/PRO/DX INJ NEW DRUG ADDON: CPT

## 2022-05-10 PROCEDURE — 93005 ELECTROCARDIOGRAM TRACING: CPT

## 2022-05-10 PROCEDURE — 84702 CHORIONIC GONADOTROPIN TEST: CPT

## 2022-05-10 RX ORDER — ONDANSETRON 8 MG/1
4 TABLET, FILM COATED ORAL ONCE
Refills: 0 | Status: DISCONTINUED | OUTPATIENT
Start: 2022-05-10 | End: 2022-05-10

## 2022-05-10 RX ORDER — PYRIDOXINE HCL (VITAMIN B6) 100 MG
1 TABLET ORAL
Qty: 60 | Refills: 0
Start: 2022-05-10 | End: 2022-06-08

## 2022-05-10 RX ORDER — DOXYLAMINE SUCCINATE 25 MG
0.5 TABLET ORAL
Qty: 30 | Refills: 0
Start: 2022-05-10 | End: 2022-06-08

## 2022-05-10 RX ORDER — PYRIDOXINE HCL (VITAMIN B6) 100 MG
100 TABLET ORAL ONCE
Refills: 0 | Status: COMPLETED | OUTPATIENT
Start: 2022-05-10 | End: 2022-05-10

## 2022-05-10 RX ORDER — SODIUM CHLORIDE 9 MG/ML
1000 INJECTION INTRAMUSCULAR; INTRAVENOUS; SUBCUTANEOUS ONCE
Refills: 0 | Status: COMPLETED | OUTPATIENT
Start: 2022-05-10 | End: 2022-05-10

## 2022-05-10 RX ORDER — ONDANSETRON 8 MG/1
4 TABLET, FILM COATED ORAL ONCE
Refills: 0 | Status: COMPLETED | OUTPATIENT
Start: 2022-05-10 | End: 2022-05-10

## 2022-05-10 RX ORDER — METOCLOPRAMIDE HCL 10 MG
10 TABLET ORAL ONCE
Refills: 0 | Status: COMPLETED | OUTPATIENT
Start: 2022-05-10 | End: 2022-05-10

## 2022-05-10 RX ADMIN — SODIUM CHLORIDE 1000 MILLILITER(S): 9 INJECTION INTRAMUSCULAR; INTRAVENOUS; SUBCUTANEOUS at 19:23

## 2022-05-10 RX ADMIN — Medication 10 MILLIGRAM(S): at 18:55

## 2022-05-10 RX ADMIN — SODIUM CHLORIDE 1000 MILLILITER(S): 9 INJECTION INTRAMUSCULAR; INTRAVENOUS; SUBCUTANEOUS at 18:55

## 2022-05-10 RX ADMIN — SODIUM CHLORIDE 1000 MILLILITER(S): 9 INJECTION INTRAMUSCULAR; INTRAVENOUS; SUBCUTANEOUS at 12:39

## 2022-05-10 RX ADMIN — Medication 100 MILLIGRAM(S): at 18:55

## 2022-05-10 RX ADMIN — ONDANSETRON 4 MILLIGRAM(S): 8 TABLET, FILM COATED ORAL at 12:39

## 2022-05-10 RX ADMIN — SODIUM CHLORIDE 1000 MILLILITER(S): 9 INJECTION INTRAMUSCULAR; INTRAVENOUS; SUBCUTANEOUS at 13:39

## 2022-05-10 NOTE — ED PROVIDER NOTE - SCRIBE NAME
Called patient to schedule a mammogram. Left message to schedule mammogram at [phone].   Nia PompaScribe)

## 2022-05-10 NOTE — ED PROVIDER NOTE - PATIENT PORTAL LINK FT
You can access the FollowMyHealth Patient Portal offered by [facility] by registering at the following website: [email or web address]. By joining [email or web address]’s FollowMyHealth portal, you will also be able to view your health information using other applications (apps) compatible with our system.

## 2022-05-10 NOTE — ED PROVIDER NOTE - CLINICAL SUMMARY MEDICAL DECISION MAKING FREE TEXT BOX
26 year old G1 at 14 weeks gestation female with past medical history of presents to the ED with complaints of vomiting and dizziness since yesterday. Likely vomiting in the setting of pregnancy. Will obtain basic labs, provide hydration and antiemetics, and reassess.

## 2022-05-10 NOTE — ED PROVIDER NOTE - NSFOLLOWUPINSTRUCTIONS_ED_ALL_ED_FT
Take vitamin B6 50 mg twice a day  Take 1/2 a unisom table once or twice a day, may cause drowsiness.     Morning Sickness       Morning sickness is when you feel like you may vomit (feel nauseous) during pregnancy. Sometimes, you may vomit. Morning sickness most often happens in the morning, but it can also happen at any time of the day. Some women may have morning sickness that makes them vomit all the time. This is a more serious problem that needs treatment.      What are the causes?    The cause of this condition is not known.      What increases the risk?    •You had vomiting or a feeling like you may vomit before your pregnancy.      •You had morning sickness in another pregnancy.       •You are pregnant with more than one baby, such as twins.        What are the signs or symptoms?    •Feeling like you may vomit.      •Vomiting.        How is this treated?    Treatment is usually not needed for this condition. You may only need to change what you eat. In some cases, your doctor may give you some things to take for your condition. These include:  •Vitamin B6 supplements.      •Medicines to treat the feeling that you may vomit.      •Ginger.        Follow these instructions at home:    Medicines     •Take over-the-counter and prescription medicines only as told by your doctor. Do not take any medicines until you talk with your doctor about them first.      •Take multivitamins before you get pregnant. These can stop or lessen the symptoms of morning sickness.      Eating and drinking     •Eat dry toast or crackers before getting out of bed.      •Eat 5 or 6 small meals a day.      •Eat dry and bland foods like rice and baked potatoes.      • Do not eat greasy, fatty, or spicy foods.      •Have someone cook for you if the smell of food causes you to vomit or to feel like you may vomit.      •If you feel like you may vomit after taking prenatal vitamins, take them at night or with a snack.      •Eat protein foods when you need a snack. Nuts, yogurt, and cheese are good choices.      •Drink fluids throughout the day.      •Try ginger ale made with real ginger, ginger tea made from fresh grated ginger, or ginger candies.      General instructions     • Do not smoke or use any products that contain nicotine or tobacco. If you need help quitting, ask your doctor.      •Use an air purifier to keep the air in your house free of smells.      •Get lots of fresh air.      •Try to avoid smells that make you feel sick.      •Try wearing an acupressure wristband. This is a wristband that is used to treat seasickness.      •Try a treatment called acupuncture. In this treatment, a doctor puts needles into certain areas of your body to make you feel better.        Contact a doctor if:    •You need medicine to feel better.      •You feel dizzy or light-headed.      •You are losing weight.        Get help right away if:    •The feeling that you may vomit will not go away, or you cannot stop vomiting.      •You faint.      •You have very bad pain in your belly.        Summary    •Morning sickness is when you feel like you may vomit (feel nauseous) during pregnancy.      •You may feel sick in the morning, but you can feel this way at any time of the day.      •Making some changes to what you eat may help your symptoms go away.      This information is not intended to replace advice given to you by your health care provider. Make sure you discuss any questions you have with your health care provider.

## 2022-05-10 NOTE — ED PROVIDER NOTE - OBJECTIVE STATEMENT
26 year old G1 at 14 weeks gestation female with past medical history of presents to the ED with complaints of vomiting and dizziness since yesterday. The patient endorses that she has had hyperemesis during her pregnancy. She reports that she tried Zofran ODT with no relief so she came to be evaluated here. The patient denies any abdominal cramping, vaginal bleeding, fever, chills, and any other symptoms. 26 year old G1 at 14 weeks gestation female with past medical history of presents to the ED with complaints of vomiting and dizziness since yesterday. The patient endorses that she has had hyperemesis during her pregnancy. She reports that she tried Zofran ODT with no relief so she came to be evaluated here. The patient denies any abdominal cramping, vaginal bleeding, fever, chills, and any other symptoms.   Allergies: Tylenol- palpitation

## 2022-05-19 ENCOUNTER — APPOINTMENT (OUTPATIENT)
Dept: OBGYN | Facility: CLINIC | Age: 27
End: 2022-05-19

## 2022-05-19 ENCOUNTER — OUTPATIENT (OUTPATIENT)
Dept: OUTPATIENT SERVICES | Facility: HOSPITAL | Age: 27
LOS: 1 days | End: 2022-05-19
Payer: MEDICAID

## 2022-05-19 ENCOUNTER — APPOINTMENT (OUTPATIENT)
Dept: OBGYN | Facility: CLINIC | Age: 27
End: 2022-05-19
Payer: MEDICAID

## 2022-05-19 DIAGNOSIS — Z87.898 PERSONAL HISTORY OF OTHER SPECIFIED CONDITIONS: ICD-10-CM

## 2022-05-19 DIAGNOSIS — Z83.3 FAMILY HISTORY OF DIABETES MELLITUS: ICD-10-CM

## 2022-05-19 DIAGNOSIS — Z86.19 PERSONAL HISTORY OF OTHER INFECTIOUS AND PARASITIC DISEASES: ICD-10-CM

## 2022-05-19 DIAGNOSIS — Z78.9 OTHER SPECIFIED HEALTH STATUS: ICD-10-CM

## 2022-05-19 DIAGNOSIS — Z34.92 ENCOUNTER FOR SUPERVISION OF NORMAL PREGNANCY, UNSPECIFIED, SECOND TRIMESTER: ICD-10-CM

## 2022-05-19 DIAGNOSIS — Z80.3 FAMILY HISTORY OF MALIGNANT NEOPLASM OF BREAST: ICD-10-CM

## 2022-05-19 DIAGNOSIS — Z00.00 ENCOUNTER FOR GENERAL ADULT MEDICAL EXAMINATION WITHOUT ABNORMAL FINDINGS: ICD-10-CM

## 2022-05-19 DIAGNOSIS — Z3A.14 14 WEEKS GESTATION OF PREGNANCY: ICD-10-CM

## 2022-05-19 PROCEDURE — 86850 RBC ANTIBODY SCREEN: CPT

## 2022-05-19 PROCEDURE — 86900 BLOOD TYPING SEROLOGIC ABO: CPT

## 2022-05-19 PROCEDURE — 85025 COMPLETE CBC W/AUTO DIFF WBC: CPT

## 2022-05-19 PROCEDURE — 81243 FMR1 GEN ALY DETC ABNL ALLEL: CPT

## 2022-05-19 PROCEDURE — 87340 HEPATITIS B SURFACE AG IA: CPT

## 2022-05-19 PROCEDURE — 87591 N.GONORRHOEAE DNA AMP PROB: CPT

## 2022-05-19 PROCEDURE — 87086 URINE CULTURE/COLONY COUNT: CPT

## 2022-05-19 PROCEDURE — 36415 COLL VENOUS BLD VENIPUNCTURE: CPT

## 2022-05-19 PROCEDURE — G0463: CPT

## 2022-05-19 PROCEDURE — 83020 HEMOGLOBIN ELECTROPHORESIS: CPT

## 2022-05-19 PROCEDURE — 86765 RUBEOLA ANTIBODY: CPT

## 2022-05-19 PROCEDURE — 86803 HEPATITIS C AB TEST: CPT

## 2022-05-19 PROCEDURE — 99203 OFFICE O/P NEW LOW 30 MIN: CPT

## 2022-05-19 PROCEDURE — 81220 CFTR GENE COM VARIANTS: CPT

## 2022-05-19 PROCEDURE — 86787 VARICELLA-ZOSTER ANTIBODY: CPT

## 2022-05-19 PROCEDURE — 83655 ASSAY OF LEAD: CPT

## 2022-05-19 PROCEDURE — 86762 RUBELLA ANTIBODY: CPT

## 2022-05-19 PROCEDURE — 86481 TB AG RESPONSE T-CELL SUSP: CPT

## 2022-05-19 PROCEDURE — 87491 CHLMYD TRACH DNA AMP PROBE: CPT

## 2022-05-19 PROCEDURE — 86780 TREPONEMA PALLIDUM: CPT

## 2022-05-19 PROCEDURE — 81329 SMN1 GENE DOS/DELETION ALYS: CPT

## 2022-05-19 PROCEDURE — 86901 BLOOD TYPING SEROLOGIC RH(D): CPT

## 2022-05-19 PROCEDURE — 87389 HIV-1 AG W/HIV-1&-2 AB AG IA: CPT

## 2022-05-20 LAB
ABO + RH PNL BLD: NORMAL
APPEARANCE: CLEAR
BASOPHILS # BLD AUTO: 0.05 K/UL
BASOPHILS NFR BLD AUTO: 0.4 %
BILIRUBIN URINE: NEGATIVE
BLD GP AB SCN SERPL QL: NORMAL
BLOOD URINE: NEGATIVE
COLOR: YELLOW
EOSINOPHIL # BLD AUTO: 0.09 K/UL
EOSINOPHIL NFR BLD AUTO: 0.6 %
GLUCOSE QUALITATIVE U: NEGATIVE
HBV SURFACE AG SER QL: NONREACTIVE
HCT VFR BLD CALC: 36.9 %
HCV AB SER QL: NONREACTIVE
HCV S/CO RATIO: 0.13 S/CO
HGB BLD-MCNC: 11.7 G/DL
HIV1+2 AB SPEC QL IA.RAPID: NONREACTIVE
IMM GRANULOCYTES NFR BLD AUTO: 0.6 %
KETONES URINE: ABNORMAL
LEUKOCYTE ESTERASE URINE: NEGATIVE
LYMPHOCYTES # BLD AUTO: 2.45 K/UL
LYMPHOCYTES NFR BLD AUTO: 17.7 %
MAN DIFF?: NORMAL
MCHC RBC-ENTMCNC: 26.3 PG
MCHC RBC-ENTMCNC: 31.7 GM/DL
MCV RBC AUTO: 82.9 FL
MONOCYTES # BLD AUTO: 0.57 K/UL
MONOCYTES NFR BLD AUTO: 4.1 %
NEUTROPHILS # BLD AUTO: 10.62 K/UL
NEUTROPHILS NFR BLD AUTO: 76.6 %
NITRITE URINE: NEGATIVE
PH URINE: 5.5
PLATELET # BLD AUTO: 313 K/UL
PROTEIN URINE: NORMAL
RBC # BLD: 4.45 M/UL
RBC # FLD: 14.1 %
SPECIFIC GRAVITY URINE: 1.02
UROBILINOGEN URINE: NORMAL
WBC # FLD AUTO: 13.86 K/UL

## 2022-05-23 LAB
BACTERIA UR CULT: NORMAL
C TRACH RRNA SPEC QL NAA+PROBE: NOT DETECTED
HGB A MFR BLD: 97.6 %
HGB A2 MFR BLD: 2.4 %
HGB FRACT BLD-IMP: NORMAL
LEAD BLD-MCNC: <1 UG/DL
M TB IFN-G BLD-IMP: NEGATIVE
MEV IGG FLD QL IA: <5 AU/ML
MEV IGG+IGM SER-IMP: NEGATIVE
N GONORRHOEA RRNA SPEC QL NAA+PROBE: NOT DETECTED
QUANTIFERON TB PLUS MITOGEN MINUS NIL: 9.99 IU/ML
QUANTIFERON TB PLUS NIL: 0.01 IU/ML
QUANTIFERON TB PLUS TB1 MINUS NIL: 0.02 IU/ML
QUANTIFERON TB PLUS TB2 MINUS NIL: 0.01 IU/ML
RUBV IGG FLD-ACNC: 18.1 INDEX
RUBV IGG SER-IMP: POSITIVE
SOURCE TP AMPLIFICATION: NORMAL
VZV AB TITR SER: NEGATIVE
VZV IGG SER IF-ACNC: <10 INDEX

## 2022-05-26 LAB
AR GENE MUT ANL BLD/T: NORMAL
CYTOLOGY CVX/VAG DOC THIN PREP: ABNORMAL
FMR1 GENE MUT ANL BLD/T: NORMAL
T PALLIDUM AB SER QL IA: NEGATIVE

## 2022-06-07 ENCOUNTER — APPOINTMENT (OUTPATIENT)
Dept: ANTEPARTUM | Facility: CLINIC | Age: 27
End: 2022-06-07
Payer: MEDICAID

## 2022-06-07 ENCOUNTER — ASOB RESULT (OUTPATIENT)
Age: 27
End: 2022-06-07

## 2022-06-07 PROCEDURE — 76805 OB US >/= 14 WKS SNGL FETUS: CPT

## 2022-06-09 ENCOUNTER — APPOINTMENT (OUTPATIENT)
Dept: OBGYN | Facility: CLINIC | Age: 27
End: 2022-06-09
Payer: MEDICAID

## 2022-06-09 ENCOUNTER — NON-APPOINTMENT (OUTPATIENT)
Age: 27
End: 2022-06-09

## 2022-06-09 ENCOUNTER — OUTPATIENT (OUTPATIENT)
Dept: OUTPATIENT SERVICES | Facility: HOSPITAL | Age: 27
LOS: 1 days | End: 2022-06-09
Payer: MEDICAID

## 2022-06-09 VITALS
WEIGHT: 105 LBS | DIASTOLIC BLOOD PRESSURE: 67 MMHG | SYSTOLIC BLOOD PRESSURE: 107 MMHG | HEART RATE: 88 BPM | OXYGEN SATURATION: 100 % | TEMPERATURE: 97.2 F | BODY MASS INDEX: 22.04 KG/M2 | HEIGHT: 58 IN

## 2022-06-09 DIAGNOSIS — Z00.00 ENCOUNTER FOR GENERAL ADULT MEDICAL EXAMINATION WITHOUT ABNORMAL FINDINGS: ICD-10-CM

## 2022-06-09 PROCEDURE — 99213 OFFICE O/P EST LOW 20 MIN: CPT

## 2022-06-09 PROCEDURE — G0463: CPT

## 2022-06-09 PROCEDURE — 81025 URINE PREGNANCY TEST: CPT

## 2022-06-09 PROCEDURE — 81003 URINALYSIS AUTO W/O SCOPE: CPT

## 2022-06-13 DIAGNOSIS — Z34.92 ENCOUNTER FOR SUPERVISION OF NORMAL PREGNANCY, UNSPECIFIED, SECOND TRIMESTER: ICD-10-CM

## 2022-06-13 DIAGNOSIS — Z3A.14 14 WEEKS GESTATION OF PREGNANCY: ICD-10-CM

## 2022-06-14 RX ORDER — METRONIDAZOLE 7.5 MG/G
0.75 GEL VAGINAL
Qty: 1 | Refills: 0 | Status: DISCONTINUED | COMMUNITY
Start: 2022-06-09 | End: 2022-06-14

## 2022-06-16 LAB
2ND TRIMESTER DATA: NORMAL
AFP PNL SERPL: NORMAL
AFP SERPL-ACNC: NORMAL
B-HCG FREE SERPL-MCNC: NORMAL
CFTR MUT TESTED BLD/T: NEGATIVE
CLINICAL BIOCHEMIST REVIEW: NORMAL
INHIBIN A SERPL-MCNC: NORMAL
NOTES NTD: NORMAL
U ESTRIOL SERPL-SCNC: NORMAL

## 2022-06-29 ENCOUNTER — APPOINTMENT (OUTPATIENT)
Dept: ANTEPARTUM | Facility: CLINIC | Age: 27
End: 2022-06-29

## 2022-06-29 PROCEDURE — 76811 OB US DETAILED SNGL FETUS: CPT

## 2022-07-03 NOTE — ED ADULT NURSE NOTE - IN THE PAST 12 MONTHS HAVE YOU USED DRUGS OTHER THAN THOSE REQUIRED FOR MEDICAL REASON?
CT angio upper extremity LT



INDICATION / CLINICAL INFORMATION: swelling and buising around fistula. Swelling and bruising started
 after dialysis on 627



TECHNIQUE: Axial CT images were obtained after injection of 100 cc Omni 350 IV contrast using CTA pro
tocol. 3 plane MIP / 3D reconstructions were produced. All CT scans at this location are performed us
ing CT dose reduction for SUBHA by means of automated exposure control. 



COMPARISON: Prior CT chest 8/1/2019



FINDINGS:

CTA of the left upper extremity including the upper chest was obtained.



Of note, there is a type B aortic dissection present. This has been noted on prior CT chest exams and
 appears grossly unchanged. The descending thoracic aorta is aneurysmal with a maximum transverse zakia
meter 4.8 cm. Again this is unchanged from chest CT from 2019.



Evaluation of the left upper extremity reveals patent arteries throughout the upper arm and forearm. 
The AV fistula is identified. There is no convincing evidence for active extravasation from the AV fi
stula. However, there is extensive fullness and heterogeneity throughout the musculature of the upper
 arm- primarily the biceps muscle- consistent with large intramuscular hematoma.



No significant osseous abnormality noted.







IMPRESSION:

1. No evidence of active extravasation within the left arm. The AV fistula appears intact as do the a
rteries of the left upper extremity.

2. Prominently enlarged biceps muscle consistent with large intramuscular hematoma. The source of the
 hematoma is not identified on this exam.

3. Chronic type B dissection of the thoracic aorta. Thoracic aortic aneurysm appears unchanged compar
ed with older CT from 2019.



Signer Name: Noemy Hall MD 

Signed: 7/3/2022 9:17 PM

Workstation Name: [id number]
No

## 2022-07-06 ENCOUNTER — APPOINTMENT (OUTPATIENT)
Dept: ANTEPARTUM | Facility: CLINIC | Age: 27
End: 2022-07-06

## 2022-07-06 ENCOUNTER — OUTPATIENT (OUTPATIENT)
Dept: OUTPATIENT SERVICES | Facility: HOSPITAL | Age: 27
LOS: 1 days | End: 2022-07-06
Payer: MEDICAID

## 2022-07-06 ENCOUNTER — APPOINTMENT (OUTPATIENT)
Dept: OBGYN | Facility: CLINIC | Age: 27
End: 2022-07-06

## 2022-07-06 ENCOUNTER — ASOB RESULT (OUTPATIENT)
Age: 27
End: 2022-07-06

## 2022-07-06 DIAGNOSIS — N76.0 ACUTE VAGINITIS: ICD-10-CM

## 2022-07-06 DIAGNOSIS — Z34.00 ENCOUNTER FOR SUPERVISION OF NORMAL FIRST PREGNANCY, UNSPECIFIED TRIMESTER: ICD-10-CM

## 2022-07-06 DIAGNOSIS — Z3A.14 14 WEEKS GESTATION OF PREGNANCY: ICD-10-CM

## 2022-07-06 DIAGNOSIS — B96.89 ACUTE VAGINITIS: ICD-10-CM

## 2022-07-06 PROCEDURE — G0463: CPT

## 2022-07-06 PROCEDURE — 81003 URINALYSIS AUTO W/O SCOPE: CPT

## 2022-07-06 PROCEDURE — 76816 OB US FOLLOW-UP PER FETUS: CPT

## 2022-07-06 PROCEDURE — 99213 OFFICE O/P EST LOW 20 MIN: CPT

## 2022-07-06 RX ORDER — ONDANSETRON HYDROCHLORIDE 4 MG/1
4 TABLET, FILM COATED ORAL
Refills: 0 | Status: COMPLETED | COMMUNITY
End: 2022-07-06

## 2022-07-06 RX ORDER — FOLIC ACID/MULTIVIT,IRON,MINER 0.4MG-18MG
200 TABLET ORAL
Qty: 30 | Refills: 5 | Status: COMPLETED | COMMUNITY
Start: 2022-05-19 | End: 2022-07-06

## 2022-07-06 RX ORDER — MULTIVIT-MIN/FOLIC/VIT K/LYCOP 400-300MCG
28-0.8 TABLET ORAL
Refills: 0 | Status: COMPLETED | COMMUNITY
End: 2022-07-06

## 2022-07-06 RX ORDER — ONDANSETRON 4 MG/1
4 TABLET, ORALLY DISINTEGRATING ORAL 3 TIMES DAILY
Qty: 30 | Refills: 0 | Status: COMPLETED | COMMUNITY
Start: 2022-06-27 | End: 2022-07-06

## 2022-07-06 RX ORDER — METRONIDAZOLE 500 MG/1
500 TABLET ORAL TWICE DAILY
Qty: 14 | Refills: 0 | Status: COMPLETED | COMMUNITY
Start: 2022-06-14 | End: 2022-07-06

## 2022-07-07 DIAGNOSIS — Z3A.22 22 WEEKS GESTATION OF PREGNANCY: ICD-10-CM

## 2022-07-07 DIAGNOSIS — Z34.92 ENCOUNTER FOR SUPERVISION OF NORMAL PREGNANCY, UNSPECIFIED, SECOND TRIMESTER: ICD-10-CM

## 2022-07-14 ENCOUNTER — NON-APPOINTMENT (OUTPATIENT)
Age: 27
End: 2022-07-14

## 2022-07-14 ENCOUNTER — APPOINTMENT (OUTPATIENT)
Dept: PEDIATRIC CARDIOLOGY | Facility: CLINIC | Age: 27
End: 2022-07-14

## 2022-07-14 LAB
CLARI ADDITIONAL INFO: NORMAL
CLARI CHROMOSOME 13: ABNORMAL
CLARI CHROMOSOME 18: NORMAL
CLARI CHROMOSOME 21: NORMAL
CLARI SEX CHROMOSOMES: NORMAL
CLARI TEST COMMENT: NORMAL
CLARITEST NIPT: ABNORMAL
FETAL FRACT: NORMAL
GESTATION AGE: NORMAL
MATERNAL WEIGHT (LBS):: NORMAL
PLEASE INCLUDE GENDER RESULTS ON THIS REPORT:: NORMAL
TYPE OF PREGNANCY:: NORMAL

## 2022-07-14 PROCEDURE — 76825 ECHO EXAM OF FETAL HEART: CPT

## 2022-07-14 PROCEDURE — 76821 MIDDLE CEREBRAL ARTERY ECHO: CPT

## 2022-07-14 PROCEDURE — 76827 ECHO EXAM OF FETAL HEART: CPT

## 2022-07-14 PROCEDURE — 76820 UMBILICAL ARTERY ECHO: CPT

## 2022-07-14 PROCEDURE — 93325 DOPPLER ECHO COLOR FLOW MAPG: CPT | Mod: 59

## 2022-07-14 PROCEDURE — 99204 OFFICE O/P NEW MOD 45 MIN: CPT | Mod: 25

## 2022-07-15 ENCOUNTER — LABORATORY RESULT (OUTPATIENT)
Age: 27
End: 2022-07-15

## 2022-07-15 ENCOUNTER — ASOB RESULT (OUTPATIENT)
Age: 27
End: 2022-07-15

## 2022-07-15 ENCOUNTER — APPOINTMENT (OUTPATIENT)
Dept: ANTEPARTUM | Facility: CLINIC | Age: 27
End: 2022-07-15

## 2022-07-15 ENCOUNTER — OUTPATIENT (OUTPATIENT)
Dept: OUTPATIENT SERVICES | Facility: HOSPITAL | Age: 27
LOS: 1 days | End: 2022-07-15
Payer: MEDICAID

## 2022-07-15 DIAGNOSIS — O26.899 OTHER SPECIFIED PREGNANCY RELATED CONDITIONS, UNSPECIFIED TRIMESTER: ICD-10-CM

## 2022-07-15 DIAGNOSIS — Z3A.00 WEEKS OF GESTATION OF PREGNANCY NOT SPECIFIED: ICD-10-CM

## 2022-07-15 LAB
APPEARANCE UR: CLEAR — SIGNIFICANT CHANGE UP
BILIRUB UR-MCNC: NEGATIVE — SIGNIFICANT CHANGE UP
COLOR SPEC: YELLOW — SIGNIFICANT CHANGE UP
DIFF PNL FLD: NEGATIVE — SIGNIFICANT CHANGE UP
GLUCOSE UR QL: NEGATIVE — SIGNIFICANT CHANGE UP
HCT VFR BLD CALC: 30.5 % — LOW (ref 34.5–45)
HGB BLD-MCNC: 10 G/DL — LOW (ref 11.5–15.5)
KETONES UR-MCNC: NEGATIVE — SIGNIFICANT CHANGE UP
LEUKOCYTE ESTERASE UR-ACNC: NEGATIVE — SIGNIFICANT CHANGE UP
MCHC RBC-ENTMCNC: 26.5 PG — LOW (ref 27–34)
MCHC RBC-ENTMCNC: 32.8 GM/DL — SIGNIFICANT CHANGE UP (ref 32–36)
MCV RBC AUTO: 80.9 FL — SIGNIFICANT CHANGE UP (ref 80–100)
NITRITE UR-MCNC: NEGATIVE — SIGNIFICANT CHANGE UP
NRBC # BLD: 0 /100 WBCS — SIGNIFICANT CHANGE UP (ref 0–0)
PH UR: 6.5 — SIGNIFICANT CHANGE UP (ref 5–8)
PLATELET # BLD AUTO: 262 K/UL — SIGNIFICANT CHANGE UP (ref 150–400)
PROT UR-MCNC: NEGATIVE — SIGNIFICANT CHANGE UP
RBC # BLD: 3.77 M/UL — LOW (ref 3.8–5.2)
RBC # FLD: 13.7 % — SIGNIFICANT CHANGE UP (ref 10.3–14.5)
SP GR SPEC: 1.02 — SIGNIFICANT CHANGE UP (ref 1.01–1.02)
UROBILINOGEN FLD QL: NEGATIVE — SIGNIFICANT CHANGE UP
WBC # BLD: 17.75 K/UL — HIGH (ref 3.8–10.5)
WBC # FLD AUTO: 17.75 K/UL — HIGH (ref 3.8–10.5)

## 2022-07-15 PROCEDURE — 76946 ECHO GUIDE FOR AMNIOCENTESIS: CPT

## 2022-07-15 PROCEDURE — 59000 AMNIOCENTESIS DIAGNOSTIC: CPT

## 2022-07-15 PROCEDURE — 81003 URINALYSIS AUTO W/O SCOPE: CPT

## 2022-07-15 PROCEDURE — 36415 COLL VENOUS BLD VENIPUNCTURE: CPT

## 2022-07-15 PROCEDURE — 59025 FETAL NON-STRESS TEST: CPT

## 2022-07-15 PROCEDURE — G0463: CPT

## 2022-07-15 PROCEDURE — 85027 COMPLETE CBC AUTOMATED: CPT

## 2022-07-15 PROCEDURE — 76816 OB US FOLLOW-UP PER FETUS: CPT

## 2022-07-18 ENCOUNTER — NON-APPOINTMENT (OUTPATIENT)
Age: 27
End: 2022-07-18

## 2022-07-20 ENCOUNTER — OUTPATIENT (OUTPATIENT)
Dept: OUTPATIENT SERVICES | Facility: HOSPITAL | Age: 27
LOS: 1 days | End: 2022-07-20
Payer: MEDICAID

## 2022-07-20 DIAGNOSIS — Z3A.00 WEEKS OF GESTATION OF PREGNANCY NOT SPECIFIED: ICD-10-CM

## 2022-07-20 DIAGNOSIS — O26.899 OTHER SPECIFIED PREGNANCY RELATED CONDITIONS, UNSPECIFIED TRIMESTER: ICD-10-CM

## 2022-07-20 LAB
APPEARANCE UR: CLEAR — SIGNIFICANT CHANGE UP
BILIRUB UR-MCNC: NEGATIVE — SIGNIFICANT CHANGE UP
COLOR SPEC: YELLOW — SIGNIFICANT CHANGE UP
DIFF PNL FLD: NEGATIVE — SIGNIFICANT CHANGE UP
GLUCOSE UR QL: NEGATIVE — SIGNIFICANT CHANGE UP
KETONES UR-MCNC: NEGATIVE — SIGNIFICANT CHANGE UP
LEUKOCYTE ESTERASE UR-ACNC: NEGATIVE — SIGNIFICANT CHANGE UP
NITRITE UR-MCNC: NEGATIVE — SIGNIFICANT CHANGE UP
PH UR: 7 — SIGNIFICANT CHANGE UP (ref 5–8)
PROT UR-MCNC: NEGATIVE — SIGNIFICANT CHANGE UP
SP GR SPEC: 1.01 — SIGNIFICANT CHANGE UP (ref 1.01–1.02)
UROBILINOGEN FLD QL: NEGATIVE — SIGNIFICANT CHANGE UP

## 2022-07-20 PROCEDURE — 59025 FETAL NON-STRESS TEST: CPT

## 2022-07-20 PROCEDURE — 76815 OB US LIMITED FETUS(S): CPT | Mod: 26

## 2022-07-20 PROCEDURE — 76815 OB US LIMITED FETUS(S): CPT

## 2022-07-20 PROCEDURE — 76817 TRANSVAGINAL US OBSTETRIC: CPT

## 2022-07-20 PROCEDURE — G0463: CPT

## 2022-07-20 PROCEDURE — 81003 URINALYSIS AUTO W/O SCOPE: CPT

## 2022-07-20 PROCEDURE — 76817 TRANSVAGINAL US OBSTETRIC: CPT | Mod: 26

## 2022-07-28 ENCOUNTER — NON-APPOINTMENT (OUTPATIENT)
Age: 27
End: 2022-07-28

## 2022-08-03 ENCOUNTER — ASOB RESULT (OUTPATIENT)
Age: 27
End: 2022-08-03

## 2022-08-03 ENCOUNTER — APPOINTMENT (OUTPATIENT)
Dept: ANTEPARTUM | Facility: CLINIC | Age: 27
End: 2022-08-03

## 2022-08-03 PROCEDURE — 76816 OB US FOLLOW-UP PER FETUS: CPT

## 2022-08-03 PROCEDURE — 99214 OFFICE O/P EST MOD 30 MIN: CPT | Mod: 25

## 2022-08-04 ENCOUNTER — APPOINTMENT (OUTPATIENT)
Dept: OBGYN | Facility: CLINIC | Age: 27
End: 2022-08-04

## 2022-08-04 ENCOUNTER — OUTPATIENT (OUTPATIENT)
Dept: OUTPATIENT SERVICES | Facility: HOSPITAL | Age: 27
LOS: 1 days | End: 2022-08-04
Payer: MEDICAID

## 2022-08-04 ENCOUNTER — NON-APPOINTMENT (OUTPATIENT)
Age: 27
End: 2022-08-04

## 2022-08-04 VITALS
BODY MASS INDEX: 24.98 KG/M2 | OXYGEN SATURATION: 100 % | DIASTOLIC BLOOD PRESSURE: 73 MMHG | SYSTOLIC BLOOD PRESSURE: 112 MMHG | WEIGHT: 119 LBS | HEIGHT: 58 IN | HEART RATE: 95 BPM | TEMPERATURE: 97.7 F

## 2022-08-04 DIAGNOSIS — Z34.00 ENCOUNTER FOR SUPERVISION OF NORMAL FIRST PREGNANCY, UNSPECIFIED TRIMESTER: ICD-10-CM

## 2022-08-04 PROCEDURE — G0463: CPT

## 2022-08-04 PROCEDURE — 85025 COMPLETE CBC W/AUTO DIFF WBC: CPT

## 2022-08-04 PROCEDURE — 82950 GLUCOSE TEST: CPT

## 2022-08-04 PROCEDURE — 81003 URINALYSIS AUTO W/O SCOPE: CPT

## 2022-08-04 PROCEDURE — 99213 OFFICE O/P EST LOW 20 MIN: CPT

## 2022-08-04 PROCEDURE — 36415 COLL VENOUS BLD VENIPUNCTURE: CPT

## 2022-08-04 PROCEDURE — 86780 TREPONEMA PALLIDUM: CPT

## 2022-08-08 ENCOUNTER — APPOINTMENT (OUTPATIENT)
Dept: ANTEPARTUM | Facility: CLINIC | Age: 27
End: 2022-08-08

## 2022-08-08 DIAGNOSIS — Z34.92 ENCOUNTER FOR SUPERVISION OF NORMAL PREGNANCY, UNSPECIFIED, SECOND TRIMESTER: ICD-10-CM

## 2022-08-08 DIAGNOSIS — Z3A.26 26 WEEKS GESTATION OF PREGNANCY: ICD-10-CM

## 2022-08-09 ENCOUNTER — EMERGENCY (EMERGENCY)
Facility: HOSPITAL | Age: 27
LOS: 1 days | Discharge: HOME | End: 2022-08-09
Payer: MEDICAID

## 2022-08-09 VITALS
HEIGHT: 56 IN | SYSTOLIC BLOOD PRESSURE: 107 MMHG | RESPIRATION RATE: 16 BRPM | WEIGHT: 121.25 LBS | HEART RATE: 98 BPM | DIASTOLIC BLOOD PRESSURE: 71 MMHG | OXYGEN SATURATION: 100 % | TEMPERATURE: 98 F

## 2022-08-09 PROCEDURE — 86787 VARICELLA-ZOSTER ANTIBODY: CPT

## 2022-08-09 PROCEDURE — 36415 COLL VENOUS BLD VENIPUNCTURE: CPT

## 2022-08-09 PROCEDURE — 99284 EMERGENCY DEPT VISIT MOD MDM: CPT

## 2022-08-09 PROCEDURE — 99284 EMERGENCY DEPT VISIT MOD MDM: CPT | Mod: 25

## 2022-08-09 NOTE — ED ADULT NURSE NOTE - SUICIDE SCREENING DEPRESSION
[FreeTextEntry1] : Lab work, Dr. Calderon' 10/18/21 nephrology note reviewed.\par Myeloproliferative disorder with thrombocytosis/leukocytosis-clinically stable at present. Patient will continue with current Hydrea dosing. He also remains on aspirin 81 mg daily.\par \par Patient continues under urologic care at [facility] [place] for his  malignancy.  He is to continue follow-up with nephrologist for his kidney disease as well.\par \par Patient was given the opportunity to ask questions. His questions have been answered to his apparent satisfaction at this time.  He is agreeable to recommended follow-up.
Negative

## 2022-08-09 NOTE — ED ADULT TRIAGE NOTE - CHIEF COMPLAINT QUOTE
Pregnant 26 weeks. "I have a bump on my right hand, my sister had the chicken pox 3 weeks ago and I want to make sure I don't have it". Unvaccinated for chicken pox, reports "my mother had chicken pox when she was 8 months pregnant with me".

## 2022-08-09 NOTE — ED PROVIDER NOTE - CLINICAL SUMMARY MEDICAL DECISION MAKING FREE TEXT BOX
26 weeks pregnant presents with 2 papules that she noticed this AM. Concerned by chickenpox as sister tested positive 3 weeks ago. Discussed with Dr Baumann over the phone: Will send IgG/IgM for varicella and outpatient Dr Krishna follow up. Low clinical suspicion for monkeypox at this time.

## 2022-08-09 NOTE — ED ADULT TRIAGE NOTE - TEMPERATURE IN FAHRENHEIT (DEGREES F)
"provider.      " 98.4

## 2022-08-09 NOTE — CONSULT NOTE ADULT - SUBJECTIVE AND OBJECTIVE BOX
HPI: IUP @ 26 wks,  LMP 2022, presents with 2 small teardrop lesions (one on finger, one on abdomen), exposed to chicken pox 3 wks prior by sister, concerned about chicken pox infection. Consulted for OB clearance. Pt denies vb, vaginal discharge; pelvic pain, abd pain, cp, sob, dizziness, palpitations, n/v/d/c, fever; chills. Admits to fm, denies lof, denies contraction pain.   Follows with Dr. Krishna at [facility], last seen 2 days prior, uncomplicated pregnancy  pob/gynhx:  ; denies h/o std's; no abn pap smear; no fibroids; normal menses   etop  pmhx: denies  pshx: denies  all: nkda  meds: PNV  sochx: no etoh/drug/tobacco use    REVIEW OF SYSTEMS: see HPI	    PE:  Vital Signs Last 24 Hrs  T(C): 36.9 (09 Aug 2022 15:49), Max: 36.9 (09 Aug 2022 15:49)  T(F): 98.4 (09 Aug 2022 15:49), Max: 98.4 (09 Aug 2022 15:49)  HR: 98 (09 Aug 2022 15:49) (98 - 98)  BP: 107/71 (09 Aug 2022 15:49) (107/71 - 107/71)  RR: 16 (09 Aug 2022 15:49) (16 - 16)  SpO2: 100% (09 Aug 2022 15:49) (100% - 100%)    Parameters below as of 09 Aug 2022 15:49  Patient On (Oxygen Delivery Method): room air    Bedside sono: fh 145, +movement    abd: +bs; soft, nt,gravid  pelvis: no cmt; no vaginal bleeding or abnormal discharge; no odor, closed/long      a/p   LMP 2022, r/o varicella  - varicella IgG/IgM sent by primary team  - patient is encouraged to f/u with OB clinic as scheduled  - no acute gyn intervention at this time  -d/w Dr Teran HPI: 27 yo F IUP @ 26 wks,  LMP 2022, presents with 2 small teardrop lesions (one on finger, one on abdomen), exposed to chicken pox 3 wks prior by sister, concerned about chicken pox infection. Consulted for OB clearance. Pt denies vb, vaginal discharge; pelvic pain, abd pain, cp, sob, dizziness, palpitations, n/v/d/c, fever; chills. Admits to fm, denies lof, denies contraction pain.   Follows with Dr. Krishna at [facility], last seen 2 days prior, uncomplicated pregnancy  pob/gynhx:  ; denies h/o std's; no abn pap smear; no fibroids; normal menses   etop  pmhx: denies  pshx: denies  all: nkda  meds: PNV  sochx: no etoh/drug/tobacco use    REVIEW OF SYSTEMS: see HPI	    PE:  Vital Signs Last 24 Hrs  T(C): 36.9 (09 Aug 2022 15:49), Max: 36.9 (09 Aug 2022 15:49)  T(F): 98.4 (09 Aug 2022 15:49), Max: 98.4 (09 Aug 2022 15:49)  HR: 98 (09 Aug 2022 15:49) (98 - 98)  BP: 107/71 (09 Aug 2022 15:49) (107/71 - 107/71)  RR: 16 (09 Aug 2022 15:49) (16 - 16)  SpO2: 100% (09 Aug 2022 15:49) (100% - 100%)    Parameters below as of 09 Aug 2022 15:49  Patient On (Oxygen Delivery Method): room air    Bedside sono: fh 145, +movement    abd: +bs; soft, nt,gravid  pelvis: no cmt; no vaginal bleeding or abnormal discharge; no odor, closed/long      a/p 27 yo IUP@26wks  LMP 2022, r/o varicella  - varicella IgG/IgM sent by primary team  - patient is encouraged to f/u with OB clinic as scheduled  - no acute gyn intervention at this time  -d/w Dr Teran

## 2022-08-09 NOTE — ED PROVIDER NOTE - OBJECTIVE STATEMENT
26 year-old female, 26 weeks pregnant, presents with concern for chickenpox. Sister (who lives in the same household) had chickenpox 3 weeks ago. This AM noticed small bump to the pinky finger and another to the upper part of abdomen. No fever, chills, swollen lymph nodes or any other complaints. Grew up in [facility] so unsure if was vaccinated for chickenpox. No other complaints.

## 2022-08-09 NOTE — ED PROVIDER NOTE - PROGRESS NOTE DETAILS
OB GHAZAL Schofield did bedside US and confirm +FHR and closed cervix. Will dc with outpatient follow up with OB Dr Krishna. Results in a few days. Pt is well appearing walking with steady gait, stable for discharge and follow up without fail with medical doctor. I had a detailed discussion with the patient and/or guardian regarding the historical points, exam findings, and any diagnostic results supporting the discharge diagnosis. Pt educated on care and need for follow up. Strict return instructions and red flag signs and symptoms discussed with patient. Questions answered. Pt shows understanding of discharge information and agrees to follow.

## 2022-08-09 NOTE — ED PROVIDER NOTE - MDM ORDERS SUBMITTED SELECTION
DISCHARGE REPORT    Updated as of December 8, 2017  Progress reporting period is from Sep 22, 2017 to Sep 26, 2017.       SUBJECTIVE  Subjective changes noted by patient:  Unknown. Patient has failed to return to clinic.    Current pain level is unknown. Patient has failed to return to clinic.  .     Initial Pain level: 10/10.   Changes in function:  Unknown. Patient has failed to return to clinic.  Adverse reaction to treatment or activity: Unknown. Patient has failed to return to clinic.    OBJECTIVE  Changes noted in objective findings:  Patient has failed to return to therapy so current objective findings are unknown.    ASSESSMENT/PLAN  Updated problem list and treatment plan: Diagnosis 1:  Acute Right-Sided Thoracic Back Pain  STG/LTGs have been met or progress has been made towards goals:  Unknown. Patient has failed to return to clinic.  Assessment of Progress: The patient has not returned to therapy. Current status is unknown.  Self Management Plans:  Patient has been instructed in a home treatment program.  Dionne continues to require the following intervention to meet STG and LTG's:  Unknown. Patient has failed to return to clinic.    Recommendations:  Unable to make an accurate recommendation at this time. Patient has failed to return to clinic.    Please refer to the daily flowsheet for treatment today, total treatment time and time spent performing 1:1 timed codes.    
None
Labs

## 2022-08-09 NOTE — ED PROVIDER NOTE - NSFOLLOWUPINSTRUCTIONS_ED_ALL_ED_FT
Follow up with the Dr Krishna this week.  If you experience any new or worsening symptoms or if you are concerned you can always come back to the emergency for a re-evaluation.

## 2022-08-09 NOTE — CONSULT NOTE ADULT - ASSESSMENT
a/p  IUP @26 wks,  LMP 2022, r/o varicella  - varicella IgG/IgM sent by primary team  - patient is encouraged to f/u with OB clinic as scheduled  - no acute gyn intervention at this time  -d/w Dr Teran a/p  27 yo IUP @26 wks,  LMP 2022, r/o varicella  - varicella IgG/IgM sent by primary team  - patient is encouraged to f/u with OB clinic as scheduled  - no acute gyn intervention at this time  -d/w Dr Teran

## 2022-08-09 NOTE — ED PROVIDER NOTE - PATIENT PORTAL LINK FT
You can access the FollowMyHealth Patient Portal offered by [facility] by registering at the following website: [email or web address]. By joining [employer]’s FollowMyHealth portal, you will also be able to view your health information using other applications (apps) compatible with our system.

## 2022-08-10 ENCOUNTER — EMERGENCY (EMERGENCY)
Facility: HOSPITAL | Age: 27
LOS: 1 days | Discharge: HOME | End: 2022-08-10
Attending: STUDENT IN AN ORGANIZED HEALTH CARE EDUCATION/TRAINING PROGRAM | Admitting: EMERGENCY MEDICINE

## 2022-08-10 ENCOUNTER — NON-APPOINTMENT (OUTPATIENT)
Age: 27
End: 2022-08-10

## 2022-08-10 VITALS
HEIGHT: 56 IN | RESPIRATION RATE: 18 BRPM | OXYGEN SATURATION: 100 % | HEART RATE: 126 BPM | DIASTOLIC BLOOD PRESSURE: 68 MMHG | SYSTOLIC BLOOD PRESSURE: 119 MMHG | TEMPERATURE: 98 F

## 2022-08-10 VITALS
OXYGEN SATURATION: 100 % | TEMPERATURE: 98 F | HEART RATE: 100 BPM | SYSTOLIC BLOOD PRESSURE: 118 MMHG | RESPIRATION RATE: 18 BRPM | DIASTOLIC BLOOD PRESSURE: 72 MMHG

## 2022-08-10 LAB
ALBUMIN SERPL ELPH-MCNC: 3.7 G/DL — SIGNIFICANT CHANGE UP (ref 3.3–5)
ALP SERPL-CCNC: 111 U/L — SIGNIFICANT CHANGE UP (ref 40–120)
ALT FLD-CCNC: 32 U/L — SIGNIFICANT CHANGE UP (ref 4–33)
ANION GAP SERPL CALC-SCNC: 11 MMOL/L — SIGNIFICANT CHANGE UP (ref 7–14)
AST SERPL-CCNC: 22 U/L — SIGNIFICANT CHANGE UP (ref 4–32)
BASOPHILS # BLD AUTO: 0.08 K/UL — SIGNIFICANT CHANGE UP (ref 0–0.2)
BASOPHILS NFR BLD AUTO: 0.6 % — SIGNIFICANT CHANGE UP (ref 0–2)
BILIRUB SERPL-MCNC: <0.2 MG/DL — SIGNIFICANT CHANGE UP (ref 0.2–1.2)
BUN SERPL-MCNC: 10 MG/DL — SIGNIFICANT CHANGE UP (ref 7–23)
CALCIUM SERPL-MCNC: 9.5 MG/DL — SIGNIFICANT CHANGE UP (ref 8.4–10.5)
CHLORIDE SERPL-SCNC: 105 MMOL/L — SIGNIFICANT CHANGE UP (ref 98–107)
CO2 SERPL-SCNC: 21 MMOL/L — LOW (ref 22–31)
CREAT SERPL-MCNC: 0.6 MG/DL — SIGNIFICANT CHANGE UP (ref 0.5–1.3)
EGFR: 126 ML/MIN/1.73M2 — SIGNIFICANT CHANGE UP
EOSINOPHIL # BLD AUTO: 0.09 K/UL — SIGNIFICANT CHANGE UP (ref 0–0.5)
EOSINOPHIL NFR BLD AUTO: 0.7 % — SIGNIFICANT CHANGE UP (ref 0–6)
GLUCOSE SERPL-MCNC: 76 MG/DL — SIGNIFICANT CHANGE UP (ref 70–99)
HCT VFR BLD CALC: 33.6 % — LOW (ref 34.5–45)
HGB BLD-MCNC: 10.5 G/DL — LOW (ref 11.5–15.5)
IANC: 9.88 K/UL — HIGH (ref 1.8–7.4)
IMM GRANULOCYTES NFR BLD AUTO: 4.4 % — HIGH (ref 0–1.5)
LYMPHOCYTES # BLD AUTO: 0.88 K/UL — LOW (ref 1–3.3)
LYMPHOCYTES # BLD AUTO: 7 % — LOW (ref 13–44)
MCHC RBC-ENTMCNC: 26.1 PG — LOW (ref 27–34)
MCHC RBC-ENTMCNC: 31.3 GM/DL — LOW (ref 32–36)
MCV RBC AUTO: 83.6 FL — SIGNIFICANT CHANGE UP (ref 80–100)
MONOCYTES # BLD AUTO: 1.11 K/UL — HIGH (ref 0–0.9)
MONOCYTES NFR BLD AUTO: 8.8 % — SIGNIFICANT CHANGE UP (ref 2–14)
NEUTROPHILS # BLD AUTO: 9.88 K/UL — HIGH (ref 1.8–7.4)
NEUTROPHILS NFR BLD AUTO: 78.5 % — HIGH (ref 43–77)
NRBC # BLD: 0 /100 WBCS — SIGNIFICANT CHANGE UP
NRBC # FLD: 0 K/UL — SIGNIFICANT CHANGE UP
PLATELET # BLD AUTO: 248 K/UL — SIGNIFICANT CHANGE UP (ref 150–400)
POTASSIUM SERPL-MCNC: 3.9 MMOL/L — SIGNIFICANT CHANGE UP (ref 3.5–5.3)
POTASSIUM SERPL-SCNC: 3.9 MMOL/L — SIGNIFICANT CHANGE UP (ref 3.5–5.3)
PROT SERPL-MCNC: 7.1 G/DL — SIGNIFICANT CHANGE UP (ref 6–8.3)
RBC # BLD: 4.02 M/UL — SIGNIFICANT CHANGE UP (ref 3.8–5.2)
RBC # FLD: 14.3 % — SIGNIFICANT CHANGE UP (ref 10.3–14.5)
SODIUM SERPL-SCNC: 137 MMOL/L — SIGNIFICANT CHANGE UP (ref 135–145)
VZV IGG FLD QL IA: <10 INDEX — SIGNIFICANT CHANGE UP
VZV IGG FLD QL IA: NEGATIVE — SIGNIFICANT CHANGE UP
WBC # BLD: 12.6 K/UL — HIGH (ref 3.8–10.5)
WBC # FLD AUTO: 12.6 K/UL — HIGH (ref 3.8–10.5)

## 2022-08-10 PROCEDURE — 93010 ELECTROCARDIOGRAM REPORT: CPT

## 2022-08-10 PROCEDURE — 99284 EMERGENCY DEPT VISIT MOD MDM: CPT | Mod: 25

## 2022-08-10 PROCEDURE — 99212 OFFICE O/P EST SF 10 MIN: CPT

## 2022-08-10 PROCEDURE — 71045 X-RAY EXAM CHEST 1 VIEW: CPT | Mod: 26

## 2022-08-10 RX ORDER — ACYCLOVIR SODIUM 500 MG
800 VIAL (EA) INTRAVENOUS ONCE
Refills: 0 | Status: COMPLETED | OUTPATIENT
Start: 2022-08-10 | End: 2022-08-10

## 2022-08-10 RX ORDER — ACYCLOVIR SODIUM 500 MG
1 VIAL (EA) INTRAVENOUS
Qty: 35 | Refills: 0
Start: 2022-08-10 | End: 2022-08-16

## 2022-08-10 RX ORDER — SODIUM CHLORIDE 9 MG/ML
1000 INJECTION INTRAMUSCULAR; INTRAVENOUS; SUBCUTANEOUS ONCE
Refills: 0 | Status: COMPLETED | OUTPATIENT
Start: 2022-08-10 | End: 2022-08-10

## 2022-08-10 RX ORDER — DIPHENHYDRAMINE HCL 50 MG
25 CAPSULE ORAL ONCE
Refills: 0 | Status: COMPLETED | OUTPATIENT
Start: 2022-08-10 | End: 2022-08-10

## 2022-08-10 RX ADMIN — SODIUM CHLORIDE 1000 MILLILITER(S): 9 INJECTION INTRAMUSCULAR; INTRAVENOUS; SUBCUTANEOUS at 13:50

## 2022-08-10 RX ADMIN — Medication 25 MILLIGRAM(S): at 16:36

## 2022-08-10 RX ADMIN — Medication 800 MILLIGRAM(S): at 16:36

## 2022-08-10 NOTE — ED PROVIDER NOTE - PATIENT PORTAL LINK FT
You can access the FollowMyHealth Patient Portal offered by [facility] by registering at the following website: [email or web address]. By joining [employer]’s FollowMyHealth portal, you will also be able to view your health information using other applications (apps) compatible with our system.

## 2022-08-10 NOTE — ED PROVIDER NOTE - PHYSICAL EXAMINATION
Gen: Awake, Alert, NAD  Head:  NC/AT  Eyes:  PERRL, EOMI, Conjunctiva pink, lids normal, no scleral icterus  ENT: OP clear, no exudates or vesicles, dry mucus membranes,   Neck: supple, nontender, no meningismus, no JVD, trachea midline,   Cardiac/CV:  S1 S2,tachycardic   Respiratory/Pulm:  CTAB, good air movement, normal resp effort, no wheezes/stridor/retractions/rales/rhonchi  Gastrointestinal/Abdomen:  Gravid, Soft, non tender , nondistended,   Back:  no CVAT,  no MLT  Ext:  warm, well perfused, moving all extremities spontaneously, no peripheral edema, distal pulses intact  Skin: intact, 2 small lesions to abdomen and several vesicular lesions to back crusted over.   Neuro:  AAOx3, sensation intact, motor 5/5 x 4 extremities, normal gait, speech clear, negative Kernig's and Brudzinski's

## 2022-08-10 NOTE — ED PROVIDER NOTE - OBJECTIVE STATEMENT
25y/o F no pmhx 26 weeks preg  c/o rash that started 2 days ago after exposure to chicken pox. patient noticed 2 lesions on her stomach and then on her back. she was seen in [facility] yesterday where her antibodies were checked and tested negative. patient states that she feels neck pain and congestion. denies dizzines, chest pain, sob, fever, chills, vaginal bleeding ot spotting, n/v/d.

## 2022-08-10 NOTE — ED PROVIDER NOTE - CLINICAL SUMMARY MEDICAL DECISION MAKING FREE TEXT BOX
25y/o F no pmhx 26 weeks preg  c/o rash that started 2 days ago after exposure to chicken pox. patient noticed 2 lesions on her stomach and then on her back. she was seen in [facility] yesterday where her antibodies were checked and tested negative. patient states that she feels neck pain and congestion. denies dizzines, chest pain, sob, fever, chills, vaginal bleeding ot spotting, n/v/d. -- patient mildly tachycardic. vesicular lesions to back and stomach. afebrile. negative Kernig's and Brudzinski's.-- will check labs, fluids, call OB for consult and NST

## 2022-08-10 NOTE — ED PROVIDER NOTE - NSFOLLOWUPINSTRUCTIONS_ED_ALL_ED_FT
Advance activity as tolerated.  Continue all previously prescribed medications as directed unless otherwise instructed.  Follow up with your OBGYN in 48-72 hours- bring copies of your results.      Return to the ER for worsening or persistent symptoms, and/or ANY NEW OR CONCERNING SYMPTOMS. If you have issues obtaining follow up, please call: [phone] to obtain a doctor or specialist who takes your insurance in your area.  You may call [phone] to make an appointment with the internal medicine clinic.     Take acyclovir 800mg 5 times a day for 7 days.     Take 25mg benadryl every 6 hours as needed for itching.     Drink plenty of fluids.

## 2022-08-10 NOTE — ED PROVIDER NOTE - ATTENDING APP SHARED VISIT CONTRIBUTION OF CARE
I (Grace) agree with above, I performed a history and physical. Counseled darrell medical staff, physician assistant, and/or medical student on medical decision making as documented. Medical decisions and treatment interventions were made in real time during the patient encounter. Additionally and/or with the following exceptions: The patient presented to the ED with possible varicella infection in the setting of pregnancy. She was exposed to a confirmed infected person about 3 weeks prior to presentation, and she to [place] where she was found to not have antibodies to varicella. She had no prodrome, no fever, no malaise, but does have scant papules with no definite vessicles. Started on acyclovir. NST as per OB was within normal limits. The patient was signed out to the oncoming attending pending the following:  cxr to rule out super imposed pneumonia.

## 2022-08-10 NOTE — ED ADULT TRIAGE NOTE - CHIEF COMPLAINT QUOTE
Pt was exposed to chicken pox sent down from  . Pt is 26 weeks pregnant. co pain to neck  and itchiness. to abdomen where there is a rash per pt. pt states felt sob this am . pt does not look tacypneic no fever.

## 2022-08-10 NOTE — ED ADULT NURSE NOTE - OBJECTIVE STATEMENT
26 yr old female patient premigravida 26 weeks pregnant stated that her sister had chicken pox two weeks ago. Yesterday she noted a lesion to her left outer pinky, belly and her back feel itchy.  Also complains of headaches 7 out 10 and SOB due to nasal congestion and stiffness of the neck.  Allergic to Tylenol because it causes my heart to race.  Denied NVD, fever and chills 20Ga IV inserted to left AC. Blood was collected and sent to the labs. 26 yr old female patient primigravida 26 weeks pregnant stated that her sister had chicken pox two weeks ago. Yesterday she noted a lesion to her left outer pinky, belly and her back feel itchy.  Also complains of headaches 7 out 10 and SOB due to nasal congestion and stiffness of the neck.  Allergic to Tylenol because it causes my heart to race.  No past medical history  and no pregnancy complication voiced  Denied NVD, fever and chills 20Ga IV inserted to left AC. Blood was collected and sent to the labs. IV fluid is administered

## 2022-08-10 NOTE — ED PROVIDER NOTE - NS ED ATTENDING STATEMENT MOD
This was a shared visit with the DELONTE. I reviewed and verified the documentation and independently performed the documented:

## 2022-08-10 NOTE — ED PROVIDER NOTE - PROGRESS NOTE DETAILS
ROSELINE Gómez: spoke with 3rd year OB resident regarding patient care. they will come see patient in ED. ROSELINE Gómez: patient states that her headache has improved. she is still feeling slight neck pain however no meningismus at this time. she states that she does not want to have an LP at this time. NP Dalia: NST WNL. OB recommending chest xray as pregnant patient's with varicella more prone to pna. patient is agreeing to chest xray. will dc home on acyclovir 800mg 5 times a day x7 days. and patient will follow-up with her OB NP Dalia: patient feeling much better. chest xray WNL. will dc home with return precautions and follow-up

## 2022-08-11 LAB
BASOPHILS # BLD AUTO: 0.08 K/UL
BASOPHILS NFR BLD AUTO: 0.5 %
EOSINOPHIL # BLD AUTO: 0.17 K/UL
EOSINOPHIL NFR BLD AUTO: 1 %
GLUCOSE 1H P 50 G GLC PO SERPL-MCNC: 129 MG/DL
HCT VFR BLD CALC: 32 %
HGB BLD-MCNC: 10.4 G/DL
IMM GRANULOCYTES NFR BLD AUTO: 3.7 %
LYMPHOCYTES # BLD AUTO: 2.39 K/UL
LYMPHOCYTES NFR BLD AUTO: 13.6 %
MAN DIFF?: NORMAL
MCHC RBC-ENTMCNC: 27 PG
MCHC RBC-ENTMCNC: 32.5 GM/DL
MCV RBC AUTO: 83.1 FL
MONOCYTES # BLD AUTO: 0.88 K/UL
MONOCYTES NFR BLD AUTO: 5 %
NEUTROPHILS # BLD AUTO: 13.41 K/UL
NEUTROPHILS NFR BLD AUTO: 76.2 %
PLATELET # BLD AUTO: 285 K/UL
RBC # BLD: 3.85 M/UL
RBC # FLD: 14.5 %
T PALLIDUM AB SER QL IA: NEGATIVE
VZV IGM SER-ACNC: <0.91 INDEX — SIGNIFICANT CHANGE UP (ref 0–0.9)
WBC # FLD AUTO: 17.58 K/UL

## 2022-08-11 NOTE — CONSULT NOTE ADULT - TIME BILLING
This is a high complexity pt with maternal/fetal findings that increase complexity of medical decision making. The time was spent counseling, educating, reviewing previous notes and tests, coordinating care and documenting.

## 2022-08-11 NOTE — CONSULT NOTE ADULT - ASSESSMENT
Recommendations:   -PCR of unroofed vesicular lesions performed by [facility]. While rash not classic for varicella, given known exposure and risk of complications in pregnancy will treat patient while awaiting results.   -Varicella IgG/IgM   -Patient offered and declined LP by ED to rule out encephalitis   -Given no clinical evidence of varicella pneumonia with low suspicion for encephalitis, recommend oral acyclovir for 7 days   -Patient stable for discharge   -Recommend continued isolation until varicella ruled out   -Recommend varicella vaccine postpartum     Patient seen with Dr. Armani Cherry Comfort PGY-5  25 yo  at 26w5d with known Varicella exposure presenting to ED with headache and new rash concerning for Varicella infection.    Recommendations:   -PCR of unroofed vesicular lesions performed by [facility]. While rash is not classic for varicella, given known exposure and risk of complications in pregnancy will treat patient while awaiting results.   -Varicella IgG/IgM   -Patient offered and declined LP by ED to rule out encephalitis   -Given no clinical evidence of varicella pneumonia with low suspicion for encephalitis, recommend oral acyclovir for 7 days   -Patient stable for discharge   -Recommend continued isolation until varicella ruled out   -Recommend varicella vaccine postpartum     Patient seen with Dr. Armani Cherry Comfort PGY-5

## 2022-08-11 NOTE — CONSULT NOTE ADULT - ATTENDING COMMENTS
Agree with above.  No evidence of severe varicella disease and lesions are few and not typical of varicella.  However, in the setting of direct exposure and pregnancy (being high risk for severe disease) would recommend treating with acyclovir.  Reviewed with ER team.

## 2022-08-11 NOTE — CONSULT NOTE ADULT - SUBJECTIVE AND OBJECTIVE BOX
MFM Consult Note    HPI: The patient is a 25 yo  at 26w5d with known Varicella exposure presenting to the ED with new headache and body rash. The patient reports her sister was diagnosed with Varicella about 3 weeks ago after an exposure at work. While she has tried to avoid the same room as her sister, they live together in the same home. Yesterday patient notes the onset of pruritic lesions on her extremities, chin and back as well as new headache with neck pain. She reports mild shortness of breath that she initially attributed to spicy food but that continued hours after she stopped eating. Overall, however, she feels well. Denies fevers, chills, cough, chest pain. After onset of lesions patient was seen at [facility]; they prescribed her oral acyclovir but she did not have anyone who was unexposed to  the medication for her. Patient denies past h/o Varicella and is unsure if she is vaccinated as they do not routinely vaccinate in her home country of [place]. She was noted to be varicella non-immune in her prenatal labs. She denies any obstetrical complaints and notes normal fetal movements. Pregnancy complicated by fetus with T13 mosaicism noted on amniocentesis; fetus without significant anomalies on anatomy scan.     NST: moderate variability with intermittent shallow decelerations c/w gestational age     History:   OB: ETOP x1   Gyn: irreg menses, -f/c/STI/abnl Pap   PMH: Denies  PSH: Denies  Meds: B6/doxylamine, PNV   NKDA   SH: From [facility]. Denies toxic habits, occ etoh prior to pregnancy   FMH: Denies h/o congenital defects     Objective:   VS: T 98.1F  /68 RR 18 SpO2 100%   Gen: well-appearing, NAD   Lungs CTABL   Abd: Soft, gravid contender   Rash: Few small raised pink papules on upper extremities, upper back, stomach and breast. Not overtly vesicular-appearing but clear-fluid containing   Normal neck ROM    VZ IgG negative    CXR negative

## 2022-08-22 ENCOUNTER — OUTPATIENT (OUTPATIENT)
Dept: OUTPATIENT SERVICES | Facility: HOSPITAL | Age: 27
LOS: 1 days | End: 2022-08-22
Payer: MEDICAID

## 2022-08-22 ENCOUNTER — APPOINTMENT (OUTPATIENT)
Dept: ANTEPARTUM | Facility: CLINIC | Age: 27
End: 2022-08-22

## 2022-08-22 ENCOUNTER — APPOINTMENT (OUTPATIENT)
Dept: OBGYN | Facility: CLINIC | Age: 27
End: 2022-08-22

## 2022-08-22 ENCOUNTER — ASOB RESULT (OUTPATIENT)
Age: 27
End: 2022-08-22

## 2022-08-22 VITALS
DIASTOLIC BLOOD PRESSURE: 70 MMHG | HEART RATE: 83 BPM | RESPIRATION RATE: 18 BRPM | TEMPERATURE: 98.4 F | SYSTOLIC BLOOD PRESSURE: 115 MMHG | OXYGEN SATURATION: 100 % | BODY MASS INDEX: 25.82 KG/M2 | HEIGHT: 58 IN | WEIGHT: 123 LBS

## 2022-08-22 DIAGNOSIS — Z34.00 ENCOUNTER FOR SUPERVISION OF NORMAL FIRST PREGNANCY, UNSPECIFIED TRIMESTER: ICD-10-CM

## 2022-08-22 LAB — VIRUS SPEC CULT: ABNORMAL

## 2022-08-22 PROCEDURE — 76816 OB US FOLLOW-UP PER FETUS: CPT

## 2022-08-22 PROCEDURE — G0463: CPT

## 2022-08-22 PROCEDURE — 99213 OFFICE O/P EST LOW 20 MIN: CPT

## 2022-08-22 PROCEDURE — 99212 OFFICE O/P EST SF 10 MIN: CPT | Mod: 25

## 2022-08-22 PROCEDURE — 76819 FETAL BIOPHYS PROFIL W/O NST: CPT

## 2022-08-22 PROCEDURE — 81003 URINALYSIS AUTO W/O SCOPE: CPT

## 2022-08-23 DIAGNOSIS — Z34.93 ENCOUNTER FOR SUPERVISION OF NORMAL PREGNANCY, UNSPECIFIED, THIRD TRIMESTER: ICD-10-CM

## 2022-08-23 DIAGNOSIS — Z3A.28 28 WEEKS GESTATION OF PREGNANCY: ICD-10-CM

## 2022-08-23 LAB
VZV AB TITR SER: POSITIVE
VZV IGG SER IF-ACNC: 732.6 INDEX

## 2022-08-23 NOTE — ED POST DISCHARGE NOTE - RESULT SUMMARY
Culture positive for varicella zoster. Called pt to relay this result. Pt was given acyclovir prescription, which she finished. Pt followed up with ob/gyn, had normal ultrasound and NST. Pt says that rash has improved.

## 2022-08-24 LAB — VZV IGM SER IF-ACNC: 1.58 INDEX

## 2022-08-30 ENCOUNTER — NON-APPOINTMENT (OUTPATIENT)
Age: 27
End: 2022-08-30

## 2022-09-08 ENCOUNTER — OUTPATIENT (OUTPATIENT)
Dept: OUTPATIENT SERVICES | Facility: HOSPITAL | Age: 27
LOS: 1 days | End: 2022-09-08
Payer: MEDICAID

## 2022-09-08 ENCOUNTER — APPOINTMENT (OUTPATIENT)
Dept: OBGYN | Facility: CLINIC | Age: 27
End: 2022-09-08

## 2022-09-08 VITALS
WEIGHT: 129 LBS | BODY MASS INDEX: 27.08 KG/M2 | DIASTOLIC BLOOD PRESSURE: 77 MMHG | SYSTOLIC BLOOD PRESSURE: 118 MMHG | HEART RATE: 89 BPM | OXYGEN SATURATION: 100 % | HEIGHT: 58 IN | RESPIRATION RATE: 18 BRPM | TEMPERATURE: 97.2 F

## 2022-09-08 DIAGNOSIS — Z34.00 ENCOUNTER FOR SUPERVISION OF NORMAL FIRST PREGNANCY, UNSPECIFIED TRIMESTER: ICD-10-CM

## 2022-09-08 PROCEDURE — 81003 URINALYSIS AUTO W/O SCOPE: CPT

## 2022-09-08 PROCEDURE — G0463: CPT

## 2022-09-08 PROCEDURE — 99213 OFFICE O/P EST LOW 20 MIN: CPT

## 2022-09-09 DIAGNOSIS — Z3A.30 30 WEEKS GESTATION OF PREGNANCY: ICD-10-CM

## 2022-09-09 DIAGNOSIS — Z34.93 ENCOUNTER FOR SUPERVISION OF NORMAL PREGNANCY, UNSPECIFIED, THIRD TRIMESTER: ICD-10-CM

## 2022-09-12 ENCOUNTER — APPOINTMENT (OUTPATIENT)
Dept: ANTEPARTUM | Facility: CLINIC | Age: 27
End: 2022-09-12

## 2022-09-12 ENCOUNTER — OUTPATIENT (OUTPATIENT)
Dept: OUTPATIENT SERVICES | Facility: HOSPITAL | Age: 27
LOS: 1 days | End: 2022-09-12
Payer: MEDICAID

## 2022-09-12 ENCOUNTER — ASOB RESULT (OUTPATIENT)
Age: 27
End: 2022-09-12

## 2022-09-12 VITALS
HEIGHT: 58 IN | DIASTOLIC BLOOD PRESSURE: 75 MMHG | SYSTOLIC BLOOD PRESSURE: 108 MMHG | HEART RATE: 80 BPM | WEIGHT: 129 LBS | BODY MASS INDEX: 27.08 KG/M2

## 2022-09-12 DIAGNOSIS — O26.899 OTHER SPECIFIED PREGNANCY RELATED CONDITIONS, UNSPECIFIED TRIMESTER: ICD-10-CM

## 2022-09-12 DIAGNOSIS — Z3A.00 WEEKS OF GESTATION OF PREGNANCY NOT SPECIFIED: ICD-10-CM

## 2022-09-12 PROCEDURE — 76820 UMBILICAL ARTERY ECHO: CPT | Mod: 59

## 2022-09-12 PROCEDURE — 76818 FETAL BIOPHYS PROFILE W/NST: CPT | Mod: 59

## 2022-09-12 PROCEDURE — 59025 FETAL NON-STRESS TEST: CPT

## 2022-09-12 PROCEDURE — 59025 FETAL NON-STRESS TEST: CPT | Mod: 26

## 2022-09-12 PROCEDURE — G0463: CPT

## 2022-09-12 PROCEDURE — 76816 OB US FOLLOW-UP PER FETUS: CPT

## 2022-09-12 RX ADMIN — Medication 12 MILLIGRAM(S): at 11:08

## 2022-09-13 ENCOUNTER — OUTPATIENT (OUTPATIENT)
Dept: OUTPATIENT SERVICES | Facility: HOSPITAL | Age: 27
LOS: 1 days | End: 2022-09-13
Payer: MEDICAID

## 2022-09-13 DIAGNOSIS — O26.899 OTHER SPECIFIED PREGNANCY RELATED CONDITIONS, UNSPECIFIED TRIMESTER: ICD-10-CM

## 2022-09-13 DIAGNOSIS — Z3A.00 WEEKS OF GESTATION OF PREGNANCY NOT SPECIFIED: ICD-10-CM

## 2022-09-13 PROCEDURE — 59025 FETAL NON-STRESS TEST: CPT

## 2022-09-13 PROCEDURE — 59025 FETAL NON-STRESS TEST: CPT | Mod: 26

## 2022-09-13 PROCEDURE — 59025 FETAL NON-STRESS TEST: CPT | Mod: 26,77

## 2022-09-13 PROCEDURE — G0463: CPT

## 2022-09-13 RX ADMIN — Medication 12 MILLIGRAM(S): at 12:15

## 2022-09-16 ENCOUNTER — ASOB RESULT (OUTPATIENT)
Age: 27
End: 2022-09-16

## 2022-09-16 ENCOUNTER — APPOINTMENT (OUTPATIENT)
Dept: ANTEPARTUM | Facility: CLINIC | Age: 27
End: 2022-09-16

## 2022-09-16 ENCOUNTER — OUTPATIENT (OUTPATIENT)
Dept: OUTPATIENT SERVICES | Facility: HOSPITAL | Age: 27
LOS: 1 days | End: 2022-09-16
Payer: MEDICAID

## 2022-09-16 DIAGNOSIS — O26.899 OTHER SPECIFIED PREGNANCY RELATED CONDITIONS, UNSPECIFIED TRIMESTER: ICD-10-CM

## 2022-09-16 DIAGNOSIS — Z3A.00 WEEKS OF GESTATION OF PREGNANCY NOT SPECIFIED: ICD-10-CM

## 2022-09-16 LAB
ALBUMIN SERPL ELPH-MCNC: 2.4 G/DL — LOW (ref 3.5–5)
ALP SERPL-CCNC: 127 U/L — HIGH (ref 40–120)
ALT FLD-CCNC: 21 U/L DA — SIGNIFICANT CHANGE UP (ref 10–60)
ANION GAP SERPL CALC-SCNC: 10 MMOL/L — SIGNIFICANT CHANGE UP (ref 5–17)
APPEARANCE UR: CLEAR — SIGNIFICANT CHANGE UP
APTT BLD: 28.5 SEC — SIGNIFICANT CHANGE UP (ref 27.5–35.5)
AST SERPL-CCNC: 16 U/L — SIGNIFICANT CHANGE UP (ref 10–40)
BASOPHILS # BLD AUTO: 0.4 K/UL — HIGH (ref 0–0.2)
BASOPHILS NFR BLD AUTO: 2 % — SIGNIFICANT CHANGE UP (ref 0–2)
BILIRUB SERPL-MCNC: 0.1 MG/DL — LOW (ref 0.2–1.2)
BILIRUB UR-MCNC: NEGATIVE — SIGNIFICANT CHANGE UP
BUN SERPL-MCNC: 9 MG/DL — SIGNIFICANT CHANGE UP (ref 7–18)
CALCIUM SERPL-MCNC: 8.5 MG/DL — SIGNIFICANT CHANGE UP (ref 8.4–10.5)
CHLORIDE SERPL-SCNC: 109 MMOL/L — HIGH (ref 96–108)
CO2 SERPL-SCNC: 23 MMOL/L — SIGNIFICANT CHANGE UP (ref 22–31)
COLOR SPEC: YELLOW — SIGNIFICANT CHANGE UP
CREAT ?TM UR-MCNC: 81 MG/DL — SIGNIFICANT CHANGE UP
CREAT SERPL-MCNC: 0.55 MG/DL — SIGNIFICANT CHANGE UP (ref 0.5–1.3)
DIFF PNL FLD: NEGATIVE — SIGNIFICANT CHANGE UP
EGFR: 129 ML/MIN/1.73M2 — SIGNIFICANT CHANGE UP
EOSINOPHIL # BLD AUTO: 0.6 K/UL — HIGH (ref 0–0.5)
EOSINOPHIL NFR BLD AUTO: 3 % — SIGNIFICANT CHANGE UP (ref 0–6)
FIBRINOGEN PPP-MCNC: 629 MG/DL — HIGH (ref 340–550)
GLUCOSE SERPL-MCNC: 95 MG/DL — SIGNIFICANT CHANGE UP (ref 70–99)
GLUCOSE UR QL: NEGATIVE — SIGNIFICANT CHANGE UP
HCT VFR BLD CALC: 32.6 % — LOW (ref 34.5–45)
HGB BLD-MCNC: 10.5 G/DL — LOW (ref 11.5–15.5)
INR BLD: 0.93 RATIO — SIGNIFICANT CHANGE UP (ref 0.88–1.16)
KETONES UR-MCNC: NEGATIVE — SIGNIFICANT CHANGE UP
LDH SERPL L TO P-CCNC: 178 U/L — SIGNIFICANT CHANGE UP (ref 120–225)
LEUKOCYTE ESTERASE UR-ACNC: NEGATIVE — SIGNIFICANT CHANGE UP
LYMPHOCYTES # BLD AUTO: 13 % — SIGNIFICANT CHANGE UP (ref 13–44)
LYMPHOCYTES # BLD AUTO: 2.58 K/UL — SIGNIFICANT CHANGE UP (ref 1–3.3)
MCHC RBC-ENTMCNC: 26.4 PG — LOW (ref 27–34)
MCHC RBC-ENTMCNC: 32.2 GM/DL — SIGNIFICANT CHANGE UP (ref 32–36)
MCV RBC AUTO: 82.1 FL — SIGNIFICANT CHANGE UP (ref 80–100)
MONOCYTES # BLD AUTO: 0.6 K/UL — SIGNIFICANT CHANGE UP (ref 0–0.9)
MONOCYTES NFR BLD AUTO: 3 % — SIGNIFICANT CHANGE UP (ref 2–14)
NEUTROPHILS # BLD AUTO: 14.71 K/UL — HIGH (ref 1.8–7.4)
NEUTROPHILS NFR BLD AUTO: 74 % — SIGNIFICANT CHANGE UP (ref 43–77)
NITRITE UR-MCNC: NEGATIVE — SIGNIFICANT CHANGE UP
PH UR: 7 — SIGNIFICANT CHANGE UP (ref 5–8)
PLATELET # BLD AUTO: 292 K/UL — SIGNIFICANT CHANGE UP (ref 150–400)
POTASSIUM SERPL-MCNC: 3.6 MMOL/L — SIGNIFICANT CHANGE UP (ref 3.5–5.3)
POTASSIUM SERPL-SCNC: 3.6 MMOL/L — SIGNIFICANT CHANGE UP (ref 3.5–5.3)
PROT ?TM UR-MCNC: 8 MG/DL — SIGNIFICANT CHANGE UP (ref 0–12)
PROT SERPL-MCNC: 6.5 G/DL — SIGNIFICANT CHANGE UP (ref 6–8.3)
PROT UR-MCNC: NEGATIVE — SIGNIFICANT CHANGE UP
PROTHROM AB SERPL-ACNC: 11.1 SEC — SIGNIFICANT CHANGE UP (ref 10.5–13.4)
RBC # BLD: 3.97 M/UL — SIGNIFICANT CHANGE UP (ref 3.8–5.2)
RBC # FLD: 15.6 % — HIGH (ref 10.3–14.5)
SODIUM SERPL-SCNC: 142 MMOL/L — SIGNIFICANT CHANGE UP (ref 135–145)
SP GR SPEC: 1.01 — SIGNIFICANT CHANGE UP (ref 1.01–1.02)
URATE SERPL-MCNC: 3.7 MG/DL — SIGNIFICANT CHANGE UP (ref 2.5–7)
UROBILINOGEN FLD QL: NEGATIVE — SIGNIFICANT CHANGE UP
WBC # BLD: 19.88 K/UL — HIGH (ref 3.8–10.5)
WBC # FLD AUTO: 19.88 K/UL — HIGH (ref 3.8–10.5)

## 2022-09-16 PROCEDURE — 85025 COMPLETE CBC W/AUTO DIFF WBC: CPT

## 2022-09-16 PROCEDURE — 85730 THROMBOPLASTIN TIME PARTIAL: CPT

## 2022-09-16 PROCEDURE — 76820 UMBILICAL ARTERY ECHO: CPT | Mod: 59

## 2022-09-16 PROCEDURE — 84156 ASSAY OF PROTEIN URINE: CPT

## 2022-09-16 PROCEDURE — 76818 FETAL BIOPHYS PROFILE W/NST: CPT

## 2022-09-16 PROCEDURE — 84550 ASSAY OF BLOOD/URIC ACID: CPT

## 2022-09-16 PROCEDURE — 82570 ASSAY OF URINE CREATININE: CPT

## 2022-09-16 PROCEDURE — 85384 FIBRINOGEN ACTIVITY: CPT

## 2022-09-16 PROCEDURE — G0463: CPT

## 2022-09-16 PROCEDURE — 59025 FETAL NON-STRESS TEST: CPT

## 2022-09-16 PROCEDURE — 85610 PROTHROMBIN TIME: CPT

## 2022-09-16 PROCEDURE — 80053 COMPREHEN METABOLIC PANEL: CPT

## 2022-09-16 PROCEDURE — 36415 COLL VENOUS BLD VENIPUNCTURE: CPT

## 2022-09-16 PROCEDURE — 81003 URINALYSIS AUTO W/O SCOPE: CPT

## 2022-09-16 PROCEDURE — 84443 ASSAY THYROID STIM HORMONE: CPT

## 2022-09-16 PROCEDURE — 83615 LACTATE (LD) (LDH) ENZYME: CPT

## 2022-09-16 RX ORDER — SODIUM CHLORIDE 9 MG/ML
1000 INJECTION, SOLUTION INTRAVENOUS ONCE
Refills: 0 | Status: COMPLETED | OUTPATIENT
Start: 2022-09-16 | End: 2022-09-16

## 2022-09-16 RX ADMIN — SODIUM CHLORIDE 1000 MILLILITER(S): 9 INJECTION, SOLUTION INTRAVENOUS at 11:10

## 2022-09-19 ENCOUNTER — ASOB RESULT (OUTPATIENT)
Age: 27
End: 2022-09-19

## 2022-09-19 ENCOUNTER — OUTPATIENT (OUTPATIENT)
Dept: OUTPATIENT SERVICES | Facility: HOSPITAL | Age: 27
LOS: 1 days | End: 2022-09-19
Payer: MEDICAID

## 2022-09-19 ENCOUNTER — NON-APPOINTMENT (OUTPATIENT)
Age: 27
End: 2022-09-19

## 2022-09-19 ENCOUNTER — APPOINTMENT (OUTPATIENT)
Dept: ANTEPARTUM | Facility: CLINIC | Age: 27
End: 2022-09-19

## 2022-09-19 ENCOUNTER — APPOINTMENT (OUTPATIENT)
Dept: OBGYN | Facility: CLINIC | Age: 27
End: 2022-09-19

## 2022-09-19 VITALS
TEMPERATURE: 97.9 F | OXYGEN SATURATION: 100 % | BODY MASS INDEX: 27.5 KG/M2 | SYSTOLIC BLOOD PRESSURE: 120 MMHG | HEIGHT: 58 IN | HEART RATE: 82 BPM | WEIGHT: 131 LBS | DIASTOLIC BLOOD PRESSURE: 81 MMHG | RESPIRATION RATE: 16 BRPM

## 2022-09-19 DIAGNOSIS — Z34.00 ENCOUNTER FOR SUPERVISION OF NORMAL FIRST PREGNANCY, UNSPECIFIED TRIMESTER: ICD-10-CM

## 2022-09-19 PROCEDURE — 76818 FETAL BIOPHYS PROFILE W/NST: CPT

## 2022-09-19 PROCEDURE — 76820 UMBILICAL ARTERY ECHO: CPT | Mod: 59

## 2022-09-19 PROCEDURE — G0463: CPT

## 2022-09-19 PROCEDURE — 99213 OFFICE O/P EST LOW 20 MIN: CPT

## 2022-09-19 PROCEDURE — 81003 URINALYSIS AUTO W/O SCOPE: CPT

## 2022-09-20 DIAGNOSIS — Z3A.32 32 WEEKS GESTATION OF PREGNANCY: ICD-10-CM

## 2022-09-20 DIAGNOSIS — Z34.93 ENCOUNTER FOR SUPERVISION OF NORMAL PREGNANCY, UNSPECIFIED, THIRD TRIMESTER: ICD-10-CM

## 2022-09-21 ENCOUNTER — NON-APPOINTMENT (OUTPATIENT)
Age: 27
End: 2022-09-21

## 2022-09-21 ENCOUNTER — APPOINTMENT (OUTPATIENT)
Dept: ANTEPARTUM | Facility: CLINIC | Age: 27
End: 2022-09-21

## 2022-09-21 ENCOUNTER — ASOB RESULT (OUTPATIENT)
Age: 27
End: 2022-09-21

## 2022-09-21 PROCEDURE — 76820 UMBILICAL ARTERY ECHO: CPT | Mod: 59

## 2022-09-21 PROCEDURE — 76818 FETAL BIOPHYS PROFILE W/NST: CPT

## 2022-09-22 ENCOUNTER — NON-APPOINTMENT (OUTPATIENT)
Age: 27
End: 2022-09-22

## 2022-09-22 ENCOUNTER — TRANSCRIPTION ENCOUNTER (OUTPATIENT)
Age: 27
End: 2022-09-22

## 2022-09-22 ENCOUNTER — APPOINTMENT (OUTPATIENT)
Dept: ANTEPARTUM | Facility: CLINIC | Age: 27
End: 2022-09-22

## 2022-09-23 ENCOUNTER — APPOINTMENT (OUTPATIENT)
Dept: ANTEPARTUM | Facility: CLINIC | Age: 27
End: 2022-09-23

## 2022-09-23 ENCOUNTER — ASOB RESULT (OUTPATIENT)
Age: 27
End: 2022-09-23

## 2022-09-23 PROCEDURE — 76820 UMBILICAL ARTERY ECHO: CPT | Mod: 59

## 2022-09-23 PROCEDURE — 76816 OB US FOLLOW-UP PER FETUS: CPT

## 2022-09-25 ENCOUNTER — NON-APPOINTMENT (OUTPATIENT)
Age: 27
End: 2022-09-25

## 2022-09-26 ENCOUNTER — ASOB RESULT (OUTPATIENT)
Age: 27
End: 2022-09-26

## 2022-09-26 ENCOUNTER — APPOINTMENT (OUTPATIENT)
Dept: ANTEPARTUM | Facility: CLINIC | Age: 27
End: 2022-09-26

## 2022-09-26 ENCOUNTER — APPOINTMENT (OUTPATIENT)
Dept: OBGYN | Facility: HOSPITAL | Age: 27
End: 2022-09-26

## 2022-09-26 ENCOUNTER — OUTPATIENT (OUTPATIENT)
Dept: OUTPATIENT SERVICES | Facility: HOSPITAL | Age: 27
LOS: 1 days | End: 2022-09-26

## 2022-09-26 VITALS
WEIGHT: 135 LBS | SYSTOLIC BLOOD PRESSURE: 103 MMHG | BODY MASS INDEX: 28.34 KG/M2 | HEIGHT: 58 IN | DIASTOLIC BLOOD PRESSURE: 58 MMHG | TEMPERATURE: 97.9 F | HEART RATE: 103 BPM

## 2022-09-26 PROCEDURE — 99212 OFFICE O/P EST SF 10 MIN: CPT | Mod: GC,25

## 2022-09-26 PROCEDURE — 76820 UMBILICAL ARTERY ECHO: CPT | Mod: 59

## 2022-09-26 PROCEDURE — 76818 FETAL BIOPHYS PROFILE W/NST: CPT

## 2022-09-27 ENCOUNTER — NON-APPOINTMENT (OUTPATIENT)
Age: 27
End: 2022-09-27

## 2022-09-27 DIAGNOSIS — Q91.7 TRISOMY 13, UNSPECIFIED: ICD-10-CM

## 2022-09-27 DIAGNOSIS — O99.891 OTHER SPECIFIED DISEASES AND CONDITIONS COMPLICATING PREGNANCY: ICD-10-CM

## 2022-09-27 DIAGNOSIS — O36.5990 MATERNAL CARE FOR OTHER KNOWN OR SUSPECTED POOR FETAL GROWTH, UNSPECIFIED TRIMESTER, NOT APPLICABLE OR UNSPECIFIED: ICD-10-CM

## 2022-09-28 ENCOUNTER — APPOINTMENT (OUTPATIENT)
Dept: ANTEPARTUM | Facility: CLINIC | Age: 27
End: 2022-09-28

## 2022-09-28 ENCOUNTER — APPOINTMENT (OUTPATIENT)
Dept: PEDIATRIC CARDIOLOGY | Facility: CLINIC | Age: 27
End: 2022-09-28

## 2022-09-28 ENCOUNTER — NON-APPOINTMENT (OUTPATIENT)
Age: 27
End: 2022-09-28

## 2022-09-28 ENCOUNTER — ASOB RESULT (OUTPATIENT)
Age: 27
End: 2022-09-28

## 2022-09-28 PROCEDURE — 76818 FETAL BIOPHYS PROFILE W/NST: CPT

## 2022-09-28 PROCEDURE — 76820 UMBILICAL ARTERY ECHO: CPT | Mod: 59

## 2022-09-29 ENCOUNTER — NON-APPOINTMENT (OUTPATIENT)
Age: 27
End: 2022-09-29

## 2022-09-30 ENCOUNTER — ASOB RESULT (OUTPATIENT)
Age: 27
End: 2022-09-30

## 2022-09-30 ENCOUNTER — APPOINTMENT (OUTPATIENT)
Dept: ANTEPARTUM | Facility: CLINIC | Age: 27
End: 2022-09-30

## 2022-09-30 PROCEDURE — 76820 UMBILICAL ARTERY ECHO: CPT | Mod: 59

## 2022-09-30 PROCEDURE — 76818 FETAL BIOPHYS PROFILE W/NST: CPT | Mod: 59

## 2022-09-30 PROCEDURE — 76816 OB US FOLLOW-UP PER FETUS: CPT

## 2022-10-02 ENCOUNTER — NON-APPOINTMENT (OUTPATIENT)
Age: 27
End: 2022-10-02

## 2022-10-03 ENCOUNTER — APPOINTMENT (OUTPATIENT)
Dept: ANTEPARTUM | Facility: CLINIC | Age: 27
End: 2022-10-03

## 2022-10-03 ENCOUNTER — NON-APPOINTMENT (OUTPATIENT)
Age: 27
End: 2022-10-03

## 2022-10-03 ENCOUNTER — APPOINTMENT (OUTPATIENT)
Dept: PEDIATRIC CARDIOLOGY | Facility: CLINIC | Age: 27
End: 2022-10-03

## 2022-10-03 ENCOUNTER — APPOINTMENT (OUTPATIENT)
Dept: OBGYN | Facility: CLINIC | Age: 27
End: 2022-10-03

## 2022-10-03 ENCOUNTER — OUTPATIENT (OUTPATIENT)
Dept: OUTPATIENT SERVICES | Facility: HOSPITAL | Age: 27
LOS: 1 days | End: 2022-10-03

## 2022-10-03 ENCOUNTER — APPOINTMENT (OUTPATIENT)
Dept: OBGYN | Facility: HOSPITAL | Age: 27
End: 2022-10-03

## 2022-10-03 ENCOUNTER — RESULT REVIEW (OUTPATIENT)
Age: 27
End: 2022-10-03

## 2022-10-03 ENCOUNTER — MED ADMIN CHARGE (OUTPATIENT)
Age: 27
End: 2022-10-03

## 2022-10-03 VITALS
BODY MASS INDEX: 28.76 KG/M2 | SYSTOLIC BLOOD PRESSURE: 123 MMHG | TEMPERATURE: 98.2 F | WEIGHT: 137 LBS | HEART RATE: 81 BPM | DIASTOLIC BLOOD PRESSURE: 75 MMHG | HEIGHT: 58 IN

## 2022-10-03 DIAGNOSIS — Z00.00 ENCOUNTER FOR GENERAL ADULT MEDICAL EXAMINATION W/OUT ABNORMAL FINDINGS: ICD-10-CM

## 2022-10-03 PROCEDURE — 76826 ECHO EXAM OF FETAL HEART: CPT

## 2022-10-03 PROCEDURE — 93325 DOPPLER ECHO COLOR FLOW MAPG: CPT

## 2022-10-03 PROCEDURE — 99215 OFFICE O/P EST HI 40 MIN: CPT | Mod: 25

## 2022-10-03 PROCEDURE — 99212 OFFICE O/P EST SF 10 MIN: CPT | Mod: GE,25,GC

## 2022-10-03 PROCEDURE — 76828 ECHO EXAM OF FETAL HEART: CPT

## 2022-10-04 ENCOUNTER — ASOB RESULT (OUTPATIENT)
Age: 27
End: 2022-10-04

## 2022-10-04 ENCOUNTER — OUTPATIENT (OUTPATIENT)
Dept: INPATIENT UNIT | Facility: HOSPITAL | Age: 27
LOS: 1 days | Discharge: HOME | End: 2022-10-04

## 2022-10-04 ENCOUNTER — APPOINTMENT (OUTPATIENT)
Dept: ANTEPARTUM | Facility: CLINIC | Age: 27
End: 2022-10-04

## 2022-10-04 ENCOUNTER — APPOINTMENT (OUTPATIENT)
Dept: OTHER | Facility: CLINIC | Age: 27
End: 2022-10-04

## 2022-10-04 VITALS — HEART RATE: 99 BPM | DIASTOLIC BLOOD PRESSURE: 83 MMHG | SYSTOLIC BLOOD PRESSURE: 125 MMHG

## 2022-10-04 VITALS
TEMPERATURE: 98 F | RESPIRATION RATE: 17 BRPM | SYSTOLIC BLOOD PRESSURE: 121 MMHG | DIASTOLIC BLOOD PRESSURE: 74 MMHG | HEART RATE: 93 BPM

## 2022-10-04 DIAGNOSIS — O09.93 SUPERVISION OF HIGH RISK PREGNANCY, UNSPECIFIED, THIRD TRIMESTER: ICD-10-CM

## 2022-10-04 DIAGNOSIS — Z20.820 CONTACT WITH AND (SUSPECTED) EXPOSURE TO VARICELLA: ICD-10-CM

## 2022-10-04 DIAGNOSIS — Z3A.00 WEEKS OF GESTATION OF PREGNANCY NOT SPECIFIED: ICD-10-CM

## 2022-10-04 DIAGNOSIS — Z23 ENCOUNTER FOR IMMUNIZATION: ICD-10-CM

## 2022-10-04 DIAGNOSIS — O36.5990 MATERNAL CARE FOR OTHER KNOWN OR SUSPECTED POOR FETAL GROWTH, UNSPECIFIED TRIMESTER, NOT APPLICABLE OR UNSPECIFIED: ICD-10-CM

## 2022-10-04 DIAGNOSIS — O26.899 OTHER SPECIFIED PREGNANCY RELATED CONDITIONS, UNSPECIFIED TRIMESTER: ICD-10-CM

## 2022-10-04 LAB
C TRACH RRNA SPEC QL NAA+PROBE: SIGNIFICANT CHANGE UP
N GONORRHOEA RRNA SPEC QL NAA+PROBE: SIGNIFICANT CHANGE UP
SPECIMEN SOURCE: SIGNIFICANT CHANGE UP

## 2022-10-04 PROCEDURE — 76818 FETAL BIOPHYS PROFILE W/NST: CPT | Mod: 26

## 2022-10-04 PROCEDURE — 76818 FETAL BIOPHYS PROFILE W/NST: CPT

## 2022-10-04 PROCEDURE — 76820 UMBILICAL ARTERY ECHO: CPT | Mod: 59

## 2022-10-04 NOTE — OB PROVIDER TRIAGE NOTE - NSHPPHYSICALEXAM_GEN_ALL_CORE
T(C): 36.9 (10-04-22 @ 10:16), Max: 36.9 (10-04-22 @ 10:16)  HR: 93 (10-04-22 @ 10:16) (93 - 93)  BP: 121/74 (10-04-22 @ 10:16) (121/74 - 121/74)  RR: 17 (10-04-22 @ 10:16) (17 - 17)  SpO2: --  General: Female sitting comfortably in no apparent distress.   Head: Normocephalic. Atraumatic.   Eyes: No discharge, lids normal, conjunctiva normal  Lungs: No resp distress  Abdomen: Soft, nontender. Gravid.   NST: 155 bpm. Moderate variability. 15x15 accelerations. Category 1. Tocometer with uterine irritability.    TAUS: Vertex. Posterior placenta. KONSTANTIN 11. BPP 8/8.   Upper extremities: Right deltoid without overlying erythema, edema, skin color changes, rashes. Mild TTP.   Neuro: No facial asymmetry, no slurred speech, moves all 4 extremities  Mood: Alert and lucid, appropriate mood and affect

## 2022-10-04 NOTE — OB RN TRIAGE NOTE - CHIEF COMPLAINT QUOTE
sent from [facility] for monitoring. sent from [facility] for monitoring. "babies heart rate was high"

## 2022-10-04 NOTE — OB PROVIDER TRIAGE NOTE - NSOBPROVIDERNOTE_OBGYN_ALL_OB_FT
Ms. ZACHERY SOLORIO is a 27y  34w4d referred from [facility] for fetal tachycardia. A/P course complicated by EFW 1707g (1%ile) 22, S/P betamethasone -, confirmed Varicella infection in second trimester s/p acyclovir, fetal tricuspid valve thickened .     Category 1 tracing. Tocometer with uterine irritability. BPP 8/8. KONSTANTIN 11.     Plan  - Prolonged monitoring x 2 hours. Ms. ZACHERY SOLORIO is a 27y  34w4d referred from [facility] for fetal tachycardia. A/P course complicated by EFW 1707g (1%ile) 22, S/P betamethasone -, confirmed Varicella infection in second trimester s/p acyclovir, fetal tricuspid valve thickened .     Category 1 tracing x 2 hours. Tocometer with uterine irritability. BPP . KONSTANTNI 11.     Plan  - Patient to be discharged home with follow up and return precautions  - Please follow up with your obstetrician at your next scheduled appointment on Thursday.   - Please return for decreased / no fetal movement, vaginal bleeding similar to that of a period, leaking / gush of fluid, regular contractions occurring 4-5 minutes  for one hour or requiring pain medication   - Patient educated of plan and demonstrate understanding. All questions answered. Discharge instructions provided and signed.     Plan d/w Dr. Taveras and Dr. Ortega

## 2022-10-04 NOTE — OB RN TRIAGE NOTE - NS_TRIAGEADDITIONAL COMMENTS_OBGYN_ALL_OB_FT
Patient states hx of suicidal thoughts @ 18 years old due to bad relationship. Pt Denies suicidal attempt at that time. Patient denies current thoughts of suicide or depression.    Ht 4'10, wt 135

## 2022-10-04 NOTE — OB PROVIDER TRIAGE NOTE - ADDITIONAL INSTRUCTIONS
- Please follow up with your obstetrician at your next scheduled appointment on Thursday.   - Please return for decreased / no fetal movement, vaginal bleeding similar to that of a period, leaking / gush of fluid, regular contractions occurring 4-5 minutes  for one hour or requiring pain medication

## 2022-10-04 NOTE — OB PROVIDER TRIAGE NOTE - HISTORY OF PRESENT ILLNESS
Ms. ZACHERY SOLORIO is a 27y  34w4d referred from [facility] for fetal tachycardia. Notes that FHR was in the 170-180s x 1 hour. Notes that fetus is typically in the 150s-160s. Patient is a transfer of care from [place] for low level mosaicism Trisomy 13, IUGR (overall 1st percentile) with elevated dopplers. PNC also complicated by      PNC: Pt denies complications with blood pressure and/or blood sugar.     peanuts (Anaphylaxis; Rash)  Tylenol (Other)      OBHx:   GynHx:  PMH:   PSH:   Psych:   Social:   Meds:    Ms. ZACHERY SOLORIO is a 27y  34w4d referred from [facility] for fetal tachycardia. Notes that FHR was in the 170-180s x 1 hour. Notes that fetus is typically in the 150s-160s. Received TDAP shot yday in right arm and notes feeling slightly feverish yday with little sleep due to right arm pain, did not take anything for the pain. Patient is a transfer of care from [place] for low level mosaicism Trisomy 13, IUGR (overall 1st percentile) with elevated dopplers. + FM. Denies leaking of fluid, vaginal bleeding.  PNC:   - EFW 1707g (1%ile) 22  - S/P betamethasone -   - Confirmed Varicella infection in second trimester s/p acyclovir   - Fetal tricuspid valve thickened   - Amniocentesis w about 3% T13 cells on FISH/fetal karyotype, Trisomy 13   - Pt denies complications with blood pressure and/or blood sugar.     peanuts (Anaphylaxis; Rash)  Tylenol ("Heart rate increases")    OBHx:   - TOP x 1   GynHx:  - Denies   PMH:   - GERD  PSH:   - Denies   Psych:   - Admits to history of SI years ago in previous relationship. Denies previous attempts, plans. Denies current SI, anxiety, depression.    Social:   - Denies   Meds:   - Denies

## 2022-10-04 NOTE — OB RN TRIAGE NOTE - NSMATERNALFETALCONCERNS_OBGYN_ALL_OB_FT
Maternal/Fetal Alert  TX OF CARE AT 32 WEEKS SECONDARY TO ABNOMAL AMNIO ( MOSAIC TRISOMY 13)  , IUGR , mildly ELEVATED  UA DOPPLERS , thickened tricuspid valve , maternal Varicella IgM + (8/22/2022)  ALERT NICU   Bobbi Mathews RN 9/20/22

## 2022-10-05 ENCOUNTER — APPOINTMENT (OUTPATIENT)
Dept: ANTEPARTUM | Facility: CLINIC | Age: 27
End: 2022-10-05

## 2022-10-05 LAB
GROUP B BETA STREP DNA (PCR): DETECTED
GROUP B BETA STREP INTERPRETATION: SIGNIFICANT CHANGE UP
SOURCE GROUP B STREP: SIGNIFICANT CHANGE UP

## 2022-10-06 ENCOUNTER — NON-APPOINTMENT (OUTPATIENT)
Age: 27
End: 2022-10-06

## 2022-10-07 ENCOUNTER — ASOB RESULT (OUTPATIENT)
Age: 27
End: 2022-10-07

## 2022-10-07 ENCOUNTER — APPOINTMENT (OUTPATIENT)
Dept: ANTEPARTUM | Facility: CLINIC | Age: 27
End: 2022-10-07

## 2022-10-07 PROCEDURE — 76818 FETAL BIOPHYS PROFILE W/NST: CPT

## 2022-10-07 PROCEDURE — 76820 UMBILICAL ARTERY ECHO: CPT | Mod: 59

## 2022-10-07 PROCEDURE — 76821 MIDDLE CEREBRAL ARTERY ECHO: CPT

## 2022-10-08 ENCOUNTER — NON-APPOINTMENT (OUTPATIENT)
Age: 27
End: 2022-10-08

## 2022-10-10 ENCOUNTER — APPOINTMENT (OUTPATIENT)
Dept: MATERNAL FETAL MEDICINE | Facility: HOSPITAL | Age: 27
End: 2022-10-10

## 2022-10-11 ENCOUNTER — ASOB RESULT (OUTPATIENT)
Age: 27
End: 2022-10-11

## 2022-10-11 ENCOUNTER — APPOINTMENT (OUTPATIENT)
Dept: ANTEPARTUM | Facility: CLINIC | Age: 27
End: 2022-10-11

## 2022-10-11 PROCEDURE — 76818 FETAL BIOPHYS PROFILE W/NST: CPT

## 2022-10-12 ENCOUNTER — APPOINTMENT (OUTPATIENT)
Dept: ANTEPARTUM | Facility: CLINIC | Age: 27
End: 2022-10-12

## 2022-10-12 ENCOUNTER — NON-APPOINTMENT (OUTPATIENT)
Age: 27
End: 2022-10-12

## 2022-10-14 ENCOUNTER — TRANSCRIPTION ENCOUNTER (OUTPATIENT)
Age: 27
End: 2022-10-14

## 2022-10-14 ENCOUNTER — APPOINTMENT (OUTPATIENT)
Dept: ANTEPARTUM | Facility: CLINIC | Age: 27
End: 2022-10-14

## 2022-10-14 ENCOUNTER — INPATIENT (INPATIENT)
Facility: HOSPITAL | Age: 27
LOS: 3 days | Discharge: HOME | End: 2022-10-18
Attending: SPECIALIST | Admitting: SPECIALIST

## 2022-10-14 ENCOUNTER — NON-APPOINTMENT (OUTPATIENT)
Age: 27
End: 2022-10-14

## 2022-10-14 ENCOUNTER — ASOB RESULT (OUTPATIENT)
Age: 27
End: 2022-10-14

## 2022-10-14 VITALS — DIASTOLIC BLOOD PRESSURE: 92 MMHG | HEART RATE: 90 BPM | SYSTOLIC BLOOD PRESSURE: 135 MMHG

## 2022-10-14 DIAGNOSIS — Z3A.00 WEEKS OF GESTATION OF PREGNANCY NOT SPECIFIED: ICD-10-CM

## 2022-10-14 DIAGNOSIS — Z98.890 OTHER SPECIFIED POSTPROCEDURAL STATES: Chronic | ICD-10-CM

## 2022-10-14 DIAGNOSIS — O26.899 OTHER SPECIFIED PREGNANCY RELATED CONDITIONS, UNSPECIFIED TRIMESTER: ICD-10-CM

## 2022-10-14 LAB
BASOPHILS # BLD AUTO: 0.09 K/UL — SIGNIFICANT CHANGE UP (ref 0–0.2)
BASOPHILS NFR BLD AUTO: 0.5 % — SIGNIFICANT CHANGE UP (ref 0–2)
BLD GP AB SCN SERPL QL: NEGATIVE — SIGNIFICANT CHANGE UP
EOSINOPHIL # BLD AUTO: 0.18 K/UL — SIGNIFICANT CHANGE UP (ref 0–0.5)
EOSINOPHIL NFR BLD AUTO: 1.1 % — SIGNIFICANT CHANGE UP (ref 0–6)
HCT VFR BLD CALC: 34.6 % — SIGNIFICANT CHANGE UP (ref 34.5–45)
HGB BLD-MCNC: 11.2 G/DL — LOW (ref 11.5–15.5)
IANC: 12.14 K/UL — HIGH (ref 1.8–7.4)
IMM GRANULOCYTES NFR BLD AUTO: 2.7 % — HIGH (ref 0–0.9)
LYMPHOCYTES # BLD AUTO: 17.9 % — SIGNIFICANT CHANGE UP (ref 13–44)
LYMPHOCYTES # BLD AUTO: 3.02 K/UL — SIGNIFICANT CHANGE UP (ref 1–3.3)
MCHC RBC-ENTMCNC: 26.9 PG — LOW (ref 27–34)
MCHC RBC-ENTMCNC: 32.4 GM/DL — SIGNIFICANT CHANGE UP (ref 32–36)
MCV RBC AUTO: 83 FL — SIGNIFICANT CHANGE UP (ref 80–100)
MONOCYTES # BLD AUTO: 0.98 K/UL — HIGH (ref 0–0.9)
MONOCYTES NFR BLD AUTO: 5.8 % — SIGNIFICANT CHANGE UP (ref 2–14)
NEUTROPHILS # BLD AUTO: 12.14 K/UL — HIGH (ref 1.8–7.4)
NEUTROPHILS NFR BLD AUTO: 72 % — SIGNIFICANT CHANGE UP (ref 43–77)
NRBC # BLD: 0 /100 WBCS — SIGNIFICANT CHANGE UP (ref 0–0)
NRBC # FLD: 0.02 K/UL — HIGH (ref 0–0)
PLATELET # BLD AUTO: 273 K/UL — SIGNIFICANT CHANGE UP (ref 150–400)
RBC # BLD: 4.17 M/UL — SIGNIFICANT CHANGE UP (ref 3.8–5.2)
RBC # FLD: 15.8 % — HIGH (ref 10.3–14.5)
RH IG SCN BLD-IMP: POSITIVE — SIGNIFICANT CHANGE UP
RH IG SCN BLD-IMP: POSITIVE — SIGNIFICANT CHANGE UP
SARS-COV-2 RNA SPEC QL NAA+PROBE: SIGNIFICANT CHANGE UP
WBC # BLD: 16.86 K/UL — HIGH (ref 3.8–10.5)
WBC # FLD AUTO: 16.86 K/UL — HIGH (ref 3.8–10.5)

## 2022-10-14 PROCEDURE — 76816 OB US FOLLOW-UP PER FETUS: CPT

## 2022-10-14 PROCEDURE — 76820 UMBILICAL ARTERY ECHO: CPT | Mod: 59

## 2022-10-14 PROCEDURE — 76819 FETAL BIOPHYS PROFIL W/O NST: CPT | Mod: 59

## 2022-10-14 RX ORDER — OXYTOCIN 10 UNIT/ML
333.33 VIAL (ML) INJECTION
Qty: 20 | Refills: 0 | Status: DISCONTINUED | OUTPATIENT
Start: 2022-10-14 | End: 2022-10-15

## 2022-10-14 RX ORDER — AMPICILLIN TRIHYDRATE 250 MG
1 CAPSULE ORAL EVERY 4 HOURS
Refills: 0 | Status: DISCONTINUED | OUTPATIENT
Start: 2022-10-14 | End: 2022-10-15

## 2022-10-14 RX ORDER — AMPICILLIN TRIHYDRATE 250 MG
2 CAPSULE ORAL ONCE
Refills: 0 | Status: COMPLETED | OUTPATIENT
Start: 2022-10-14 | End: 2022-10-14

## 2022-10-14 RX ORDER — CITRIC ACID/SODIUM CITRATE 300-500 MG
15 SOLUTION, ORAL ORAL EVERY 6 HOURS
Refills: 0 | Status: DISCONTINUED | OUTPATIENT
Start: 2022-10-14 | End: 2022-10-15

## 2022-10-14 RX ORDER — OXYTOCIN 10 UNIT/ML
VIAL (ML) INJECTION
Qty: 30 | Refills: 0 | Status: DISCONTINUED | OUTPATIENT
Start: 2022-10-14 | End: 2022-10-15

## 2022-10-14 RX ORDER — CHLORHEXIDINE GLUCONATE 213 G/1000ML
1 SOLUTION TOPICAL ONCE
Refills: 0 | Status: DISCONTINUED | OUTPATIENT
Start: 2022-10-14 | End: 2022-10-15

## 2022-10-14 RX ORDER — SODIUM CHLORIDE 9 MG/ML
1000 INJECTION, SOLUTION INTRAVENOUS ONCE
Refills: 0 | Status: COMPLETED | OUTPATIENT
Start: 2022-10-14 | End: 2022-10-14

## 2022-10-14 RX ORDER — INFLUENZA VIRUS VACCINE 15; 15; 15; 15 UG/.5ML; UG/.5ML; UG/.5ML; UG/.5ML
0.5 SUSPENSION INTRAMUSCULAR ONCE
Refills: 0 | Status: DISCONTINUED | OUTPATIENT
Start: 2022-10-14 | End: 2022-10-18

## 2022-10-14 RX ORDER — ONDANSETRON 8 MG/1
4 TABLET, FILM COATED ORAL ONCE
Refills: 0 | Status: COMPLETED | OUTPATIENT
Start: 2022-10-14 | End: 2022-10-14

## 2022-10-14 RX ORDER — SODIUM CHLORIDE 9 MG/ML
1000 INJECTION, SOLUTION INTRAVENOUS
Refills: 0 | Status: DISCONTINUED | OUTPATIENT
Start: 2022-10-14 | End: 2022-10-15

## 2022-10-14 RX ORDER — SODIUM CHLORIDE 9 MG/ML
1000 INJECTION, SOLUTION INTRAVENOUS ONCE
Refills: 0 | Status: DISCONTINUED | OUTPATIENT
Start: 2022-10-14 | End: 2022-10-15

## 2022-10-14 RX ADMIN — Medication 108 GRAM(S): at 21:29

## 2022-10-14 RX ADMIN — SODIUM CHLORIDE 125 MILLILITER(S): 9 INJECTION, SOLUTION INTRAVENOUS at 19:32

## 2022-10-14 RX ADMIN — ONDANSETRON 4 MILLIGRAM(S): 8 TABLET, FILM COATED ORAL at 22:29

## 2022-10-14 RX ADMIN — SODIUM CHLORIDE 1000 MILLILITER(S): 9 INJECTION, SOLUTION INTRAVENOUS at 17:00

## 2022-10-14 RX ADMIN — Medication 216 GRAM(S): at 17:30

## 2022-10-14 RX ADMIN — SODIUM CHLORIDE 125 MILLILITER(S): 9 INJECTION, SOLUTION INTRAVENOUS at 17:46

## 2022-10-14 RX ADMIN — Medication 2 MILLIUNIT(S)/MIN: at 18:11

## 2022-10-14 NOTE — OB RN TRIAGE NOTE - NSMATERNALFETALCONCERNS_OBGYN_ALL_OB_FT
Maternal/Fetal Alert  TX OF CARE AT 32 WEEKS SECONDARY TO ABNOMAL AMNIO ( MOSAIC TRISOMY 13)  , IUGR , mildly ELEVATED  UA DOPPLERS , thickened tricuspid valve , maternal Varicella IgM + (2022)  ALERT NICU   Bobbi Mathews RN 9/20/22  10/3/2022 follow up fetal echo reveals MILD LV-RV size discrepancy , mildly hypoplastic aortic valve annulus . The ascending and transverse aortic arch along with the isthmus , appear narrow ,the descending aorta is normal . Tortous ductus arteriousus . POST  ECHOCARDIOGRAM AND CONSULT within 24 hours .

## 2022-10-14 NOTE — OB PROVIDER H&P - CURRENT PREGNANCY COMPLICATIONS, OB PROFILE
Fetal Anomalies/Gestational Age less than 36 Weeks/Intrauterine Growth Restriction Fetal Anomalies/Intrauterine Growth Restriction

## 2022-10-14 NOTE — OB RN PATIENT PROFILE - FALL HARM RISK - UNIVERSAL INTERVENTIONS
Bed in lowest position, wheels locked, appropriate side rails in place/Call bell, personal items and telephone in reach/Instruct patient to call for assistance before getting out of bed or chair/Non-slip footwear when patient is out of bed/[place] to call system/Physically safe environment - no spills, clutter or unnecessary equipment/Purposeful Proactive Rounding/Room/bathroom lighting operational, light cord in reach

## 2022-10-14 NOTE — OB PROVIDER H&P - HISTORY OF PRESENT ILLNESS
27y  at 36w GA who presents to L&D from ATU for IOL due to intermittent reverse UA and absent end diastolic dopplers, IUGR. Patient denies vaginal bleeding, contractions and leakage of fluid. She endorses good fetal movement. No other complaints at this time.     MARELY: 22      Prenatal course is significant for: IUGR <1%, Varicella treated with Acyclovir, Fetal anomalies including mosaic trisomy 13, thickened tricuspid valve, narrowed aortic arch    Obhx:   - 2019: TOP with d&c  Gynhx: -fibroids, -ovarian cysts, denies STD hx, denies abnormal PAPs   PMH: Denies including asthma, DM, HTN  PSH: D&Cx1  Soc hx: Denies EtOH, tobacco and illicit drug use during this pregnancy  Anx/dep:  Denies  Meds: PNV, Zofran PRN, Pepcid PRN  Allergies: Tylenol (palpitations), peanuts (anaphylaxis)  GBS: +  EFW: 1915    Will accept blood transfusion    Patient would like a Mirena IUD postpartum for contraception  Declines circumcision for male fetus 27y  at 36w GA who presents to L&D from ATU for IOL due to intermittent reverse UA and absent end diastolic dopplers, IUGR. Patient denies vaginal bleeding, contractions and leakage of fluid. She endorses good fetal movement. No other complaints at this time.     MARELY: 22    Prenatal course is significant for:   - IUGR <1% (BPD, HC, AC all <1%ile)  - Mosaic trisomy 13, 3% on amniocentesis   - Fetal cardia anomalies MILD LV-RV size discrepancy ,mildly hypoplastic aortic valve annulus . narrow ascending and transverse aortic arch along with the isthmus , appear narrow ,the descending aorta is normal. Tortous ductus arteriousus .   - Admitted in second trimester for Varicella treated with Acyclovir x7d. CXR negative for pneumonia, declined LP Fetal anomalies including mosaic trisomy 13, thickened tricuspid valve, narrowed aortic arch    Obhx:   - 2019: TOP with d&c  Gynhx: -fibroids, -ovarian cysts, denies STD hx, denies abnormal PAPs   PMH: Denies including asthma, DM, HTN  PSH: D&Cx1  Soc hx: Denies EtOH, tobacco and illicit drug use during this pregnancy  Anx/dep:  Denies  Meds: PNV, Zofran PRN, Pepcid PRN  Allergies: Tylenol (palpitations), peanuts (anaphylaxis)  GBS: +  EFW: 5    Will accept blood transfusion    Patient would like a Mirena IUD postpartum for contraception  Declines circumcision for male fetus 27y  at 36w GA who presents to L&D from [facility] for IOL due to iAEDV and REDV, fetal tach on routine US. Patient was scanned by Dr. Wilian Ballard today. Fetus is known IUGR and on scan today had iAEDV/REDV. Fetus was also noted to be tachy to 170s on scan, unable to perform NST at ATU. Patient denies vaginal bleeding, contractions and leakage of fluid. She endorses good fetal movement. No other complaints at this time.     On arrival to L&D, NST reactive with baseline 155, mod jessica, +accels, -decels    MARELY: 22    Prenatal course is significant for:   - IUGR <1% (BPD, HC, AC all <1%ile), iAEDV and REDV today. g  - Mosaic trisomy 13, 3% on amniocentesis   - Fetal cardia anomalies MILD LV-RV size discrepancy ,mildly hypoplastic aortic valve annulus . narrow ascending and transverse aortic arch along with the isthmus , appear narrow ,the descending aorta is normal. Tortous ductus arteriousus .   - Admitted in second trimester for Varicella treated with Acyclovir x7d. CXR negative for pneumonia, declined LP Fetal anomalies including mosaic trisomy 13, thickened tricuspid valve, narrowed aortic arch    Obhx:   - 2019: TOP with d&c  Gynhx: -fibroids, -ovarian cysts, denies STD hx, denies abnormal PAPs   PMH: Denies including asthma, DM, HTN  PSH: D&Cx1  Soc hx: Denies EtOH, tobacco and illicit drug use during this pregnancy  Anx/dep:  Denies  Meds: PNV, Zofran PRN, Pepcid PRN  Allergies: Tylenol (palpitations), peanuts (anaphylaxis)  GBS: +  EFW:     Will accept blood transfusion    Patient would like a Mirena IUD postpartum for contraception  Declines circumcision for male fetus

## 2022-10-14 NOTE — OB PROVIDER H&P - ASSESSMENT
A/P: 27y  at 36w GA who presents to L&D from ATU for IOL due to absent dopplers, IUGR.  Fetal alert for multiple fetal anomalies  -Admit to L&D  -Consents signed  -Postpartum Mirena consent signed  -Admission labs  -NPO, except ice chips   -IV fluids  -Labor:   -Fetus: Cat I tracing. Continuous toco and fetal monitoring.   -GBS: Positive, GBS ppx required with Ampicillin     Discussed with Dr. KENIA Ferris PGY1 A/P: 27y  at 36w GA who presents to L&D from ATU for IOL due to intermittent reverse and absent end diastolic UA dopplers, IUGR.  Fetal alert for multiple fetal anomalies  -Admit to L&D  -Consents signed  -Postpartum Mirena consent signed  -Admission labs  -NPO, except ice chips   -IV fluids  -Labor: IOL with pitocin and cervical balloon, evaluate if fetus can tolerate labor, consider  section if FHT nonreassuring  -Fetus: Cat I tracing. Continuous toco and fetal monitoring.   -GBS: Positive, GBS ppx required with Ampicillin     Discussed with Dr. Mccarthy and FERNANDO Mora PGY3  KENIA Ferris PGY1 A/P: 27y  at 36w GA sent from ATU, admitted for induction for iAEDV/REDV in setting of IUGR, mosaic T13. Patient discussed with MFM and delivery is recommended at this time. NST reactive. No other acute complaints.     Plan:   -Admit to L&D  -Consents signed  -Postpartum Mirena consent signed  -Admission labs  -CLD  -IV fluids  -Fetus: Cat I tracing. Continuous toco and fetal monitoring.   -GBS: Positive, GBS ppx required with Ampicillin     Discussed with Dr. Mccarthy and FERNANDO Mora PGY3  KENIA Ferris PGY1    R3 Addendum:     Patient seen and evaluated by me, history reconfirmed. Patient aware of diagnosis of Mosaic T13, fetal cardiac anomalies and understands that baby will be evaluated by pediatrician after birth. Discussed with patient that concern for FHR abnormalities in ATU no longer acute concern, baseline 155 with accels (170s likely accel during scan). Patient understands need for delivery due to IUGR with abnl dopplers as recommended by Dr. Wilian Ballard and Dr. Mccarthy. Mode of delivery discussed with patient. Patient counseled that given reassuring NST, vertex presentation, induction and vaginal delivery can be attempted. Discussed possibility of IUGR infant with cardiac defects and doppler abnormalities not tolerating an induction of labor and possible need for  section, patient voices her understanding.     Plan for induction with pitocin and cervical balloon. Continuous EFM.     NICU aware of patient, will be present at delivery.     Genet Mora MD PGY3   Plan discussed with MFM Dr. Mccarthy and Dr. Mendez,  Dr. Alatorre, Chief Resident Dr. Taveras

## 2022-10-14 NOTE — OB PROVIDER H&P - NSHPPHYSICALEXAM_GEN_ALL_CORE
T(C): 36.8 (10-14-22 @ 15:30), Max: 36.8 (10-14-22 @ 15:30)  HR: 80 (10-14-22 @ 16:02) (74 - 90)  BP: 126/83 (10-14-22 @ 16:02) (126/83 - 135/92)  RR: 16 (10-14-22 @ 15:18) (16 - 16)  SpO2: --    Gen: NAD, well-appearing, AAOx3   Abd: Soft, gravid  Ext: non-tender, non-edematous  SVE:  1.5/50/-3  Bedside sono: vertex  FHT: 155bpm, moderate variability, +accels, no decels  [place]: irregular contractions

## 2022-10-14 NOTE — OB RN PATIENT PROFILE - ALERT: PERTINENT HISTORY
Amniocentesis/1st Trimester Sonogram/20 Week Level II Sonogram/Follow up Sonogram for Growth/Fetal Non-Stress Test (NST)/Ultra Screen at 12 Weeks

## 2022-10-14 NOTE — OB RN PATIENT PROFILE - FUNCTIONAL ASSESSMENT - BASIC MOBILITY 6.
4-calculated by average/Not able to assess (calculate score using [facility] averaging method)

## 2022-10-14 NOTE — OB RN PATIENT PROFILE - PATIENT ON (OXYGEN DELIVERY METHOD)
160.02
Normal vision: sees adequately in most situations; can see medication labels, newsprint
room air

## 2022-10-15 LAB
COVID-19 SPIKE DOMAIN AB INTERP: POSITIVE
COVID-19 SPIKE DOMAIN ANTIBODY RESULT: >250 U/ML — HIGH
SARS-COV-2 IGG+IGM SERPL QL IA: >250 U/ML — HIGH
SARS-COV-2 IGG+IGM SERPL QL IA: POSITIVE
T PALLIDUM AB TITR SER: NEGATIVE — SIGNIFICANT CHANGE UP

## 2022-10-15 PROCEDURE — 88307 TISSUE EXAM BY PATHOLOGIST: CPT | Mod: 26

## 2022-10-15 DEVICE — IUD MIRENA: Type: IMPLANTABLE DEVICE | Status: FUNCTIONAL

## 2022-10-15 RX ORDER — NALBUPHINE HYDROCHLORIDE 10 MG/ML
2.5 INJECTION, SOLUTION INTRAMUSCULAR; INTRAVENOUS; SUBCUTANEOUS EVERY 6 HOURS
Refills: 0 | Status: DISCONTINUED | OUTPATIENT
Start: 2022-10-16 | End: 2022-10-18

## 2022-10-15 RX ORDER — SODIUM CHLORIDE 9 MG/ML
300 INJECTION INTRAMUSCULAR; INTRAVENOUS; SUBCUTANEOUS ONCE
Refills: 0 | Status: COMPLETED | OUTPATIENT
Start: 2022-10-15 | End: 2022-10-15

## 2022-10-15 RX ORDER — OXYTOCIN 10 UNIT/ML
VIAL (ML) INJECTION
Qty: 30 | Refills: 0 | Status: DISCONTINUED | OUTPATIENT
Start: 2022-10-15 | End: 2022-10-17

## 2022-10-15 RX ORDER — OXYTOCIN 10 UNIT/ML
333.33 VIAL (ML) INJECTION
Qty: 20 | Refills: 0 | Status: DISCONTINUED | OUTPATIENT
Start: 2022-10-15 | End: 2022-10-17

## 2022-10-15 RX ORDER — OXYTOCIN 10 UNIT/ML
2 VIAL (ML) INJECTION
Qty: 30 | Refills: 0 | Status: DISCONTINUED | OUTPATIENT
Start: 2022-10-15 | End: 2022-10-15

## 2022-10-15 RX ORDER — OXYCODONE HYDROCHLORIDE 5 MG/1
5 TABLET ORAL
Refills: 0 | Status: COMPLETED | OUTPATIENT
Start: 2022-10-16 | End: 2022-10-23

## 2022-10-15 RX ORDER — DEXAMETHASONE 0.5 MG/5ML
4 ELIXIR ORAL EVERY 6 HOURS
Refills: 0 | Status: DISCONTINUED | OUTPATIENT
Start: 2022-10-15 | End: 2022-10-18

## 2022-10-15 RX ORDER — DIPHENHYDRAMINE HCL 50 MG
25 CAPSULE ORAL EVERY 6 HOURS
Refills: 0 | Status: DISCONTINUED | OUTPATIENT
Start: 2022-10-15 | End: 2022-10-18

## 2022-10-15 RX ORDER — KETOROLAC TROMETHAMINE 30 MG/ML
30 SYRINGE (ML) INJECTION EVERY 6 HOURS
Refills: 0 | Status: DISCONTINUED | OUTPATIENT
Start: 2022-10-15 | End: 2022-10-16

## 2022-10-15 RX ORDER — HEPARIN SODIUM 5000 [USP'U]/ML
5000 INJECTION INTRAVENOUS; SUBCUTANEOUS EVERY 12 HOURS
Refills: 0 | Status: DISCONTINUED | OUTPATIENT
Start: 2022-10-15 | End: 2022-10-18

## 2022-10-15 RX ORDER — CITRIC ACID/SODIUM CITRATE 300-500 MG
15 SOLUTION, ORAL ORAL EVERY 6 HOURS
Refills: 0 | Status: DISCONTINUED | OUTPATIENT
Start: 2022-10-15 | End: 2022-10-18

## 2022-10-15 RX ORDER — SIMETHICONE 80 MG/1
80 TABLET, CHEWABLE ORAL EVERY 4 HOURS
Refills: 0 | Status: DISCONTINUED | OUTPATIENT
Start: 2022-10-15 | End: 2022-10-18

## 2022-10-15 RX ORDER — NALOXONE HYDROCHLORIDE 4 MG/.1ML
0.1 SPRAY NASAL
Refills: 0 | Status: DISCONTINUED | OUTPATIENT
Start: 2022-10-16 | End: 2022-10-18

## 2022-10-15 RX ORDER — MAGNESIUM HYDROXIDE 400 MG/1
30 TABLET, CHEWABLE ORAL
Refills: 0 | Status: DISCONTINUED | OUTPATIENT
Start: 2022-10-15 | End: 2022-10-18

## 2022-10-15 RX ORDER — SODIUM CHLORIDE 9 MG/ML
1000 INJECTION, SOLUTION INTRAVENOUS
Refills: 0 | Status: DISCONTINUED | OUTPATIENT
Start: 2022-10-16 | End: 2022-10-17

## 2022-10-15 RX ORDER — OXYTOCIN 10 UNIT/ML
333.33 VIAL (ML) INJECTION
Qty: 20 | Refills: 0 | Status: DISCONTINUED | OUTPATIENT
Start: 2022-10-16 | End: 2022-10-17

## 2022-10-15 RX ORDER — TETANUS TOXOID, REDUCED DIPHTHERIA TOXOID AND ACELLULAR PERTUSSIS VACCINE, ADSORBED 5; 2.5; 8; 8; 2.5 [IU]/.5ML; [IU]/.5ML; UG/.5ML; UG/.5ML; UG/.5ML
0.5 SUSPENSION INTRAMUSCULAR ONCE
Refills: 0 | Status: DISCONTINUED | OUTPATIENT
Start: 2022-10-16 | End: 2022-10-18

## 2022-10-15 RX ORDER — SODIUM CHLORIDE 9 MG/ML
1000 INJECTION, SOLUTION INTRAVENOUS ONCE
Refills: 0 | Status: DISCONTINUED | OUTPATIENT
Start: 2022-10-15 | End: 2022-10-17

## 2022-10-15 RX ORDER — ONDANSETRON 8 MG/1
4 TABLET, FILM COATED ORAL EVERY 6 HOURS
Refills: 0 | Status: DISCONTINUED | OUTPATIENT
Start: 2022-10-15 | End: 2022-10-18

## 2022-10-15 RX ORDER — CHLORHEXIDINE GLUCONATE 213 G/1000ML
1 SOLUTION TOPICAL ONCE
Refills: 0 | Status: DISCONTINUED | OUTPATIENT
Start: 2022-10-15 | End: 2022-10-18

## 2022-10-15 RX ORDER — IBUPROFEN 200 MG
600 TABLET ORAL EVERY 6 HOURS
Refills: 0 | Status: COMPLETED | OUTPATIENT
Start: 2022-10-16 | End: 2023-09-14

## 2022-10-15 RX ORDER — LANOLIN
1 OINTMENT (GRAM) TOPICAL EVERY 6 HOURS
Refills: 0 | Status: DISCONTINUED | OUTPATIENT
Start: 2022-10-15 | End: 2022-10-18

## 2022-10-15 RX ORDER — AMPICILLIN TRIHYDRATE 250 MG
1 CAPSULE ORAL EVERY 4 HOURS
Refills: 0 | Status: DISCONTINUED | OUTPATIENT
Start: 2022-10-15 | End: 2022-10-16

## 2022-10-15 RX ORDER — SODIUM CHLORIDE 9 MG/ML
1000 INJECTION, SOLUTION INTRAVENOUS
Refills: 0 | Status: DISCONTINUED | OUTPATIENT
Start: 2022-10-15 | End: 2022-10-17

## 2022-10-15 RX ORDER — SODIUM CHLORIDE 9 MG/ML
1000 INJECTION INTRAMUSCULAR; INTRAVENOUS; SUBCUTANEOUS
Refills: 0 | Status: DISCONTINUED | OUTPATIENT
Start: 2022-10-15 | End: 2022-10-15

## 2022-10-15 RX ORDER — OXYCODONE HYDROCHLORIDE 5 MG/1
5 TABLET ORAL ONCE
Refills: 0 | Status: DISCONTINUED | OUTPATIENT
Start: 2022-10-16 | End: 2022-10-18

## 2022-10-15 RX ADMIN — Medication 108 GRAM(S): at 01:30

## 2022-10-15 RX ADMIN — HEPARIN SODIUM 5000 UNIT(S): 5000 INJECTION INTRAVENOUS; SUBCUTANEOUS at 17:54

## 2022-10-15 RX ADMIN — SODIUM CHLORIDE 600 MILLILITER(S): 9 INJECTION INTRAMUSCULAR; INTRAVENOUS; SUBCUTANEOUS at 04:11

## 2022-10-15 RX ADMIN — Medication 1000 MILLIUNIT(S)/MIN: at 06:46

## 2022-10-15 RX ADMIN — Medication 30 MILLIGRAM(S): at 10:32

## 2022-10-15 RX ADMIN — Medication 30 MILLIGRAM(S): at 11:30

## 2022-10-15 RX ADMIN — Medication 30 MILLIGRAM(S): at 17:54

## 2022-10-15 RX ADMIN — Medication 108 GRAM(S): at 04:25

## 2022-10-15 RX ADMIN — Medication 30 MILLIGRAM(S): at 23:47

## 2022-10-15 RX ADMIN — SODIUM CHLORIDE 125 MILLILITER(S): 9 INJECTION, SOLUTION INTRAVENOUS at 06:46

## 2022-10-15 NOTE — OB PROVIDER LABOR PROGRESS NOTE - NS_OBIHIFHRDETAILS_OBGYN_ALL_OB_FT
145, mod, + accels, recurrent late decels and variables
155bpm, moderate variability, +accels, no decels
145, mod, + accels, intermittent decels  discontinuous

## 2022-10-15 NOTE — OB PROVIDER DELIVERY SUMMARY - NSPROVIDERDELIVERYNOTE_OBGYN_ALL_OB_FT
pLTCS of viable male infant, vertex, apgars 9/9, 1860g  grossly normal uterus, b/l tubes and ovaries  hysterotomy closed in 1 layer  Mirena IUD inserted into fundus of uterus, strings brought through the cervix (LOT#  369/1600/120    Dictation#[id number] pLTCS of viable male infant, vertex, apgars 9/9, 1860g  grossly normal uterus, b/l tubes and ovaries  hysterotomy closed in 1 layer  Mirena IUD inserted into fundus of uterus, strings brought through the cervix (LOT#[id number], OCT 2024)  369/1600/120    Dictation#[id number]

## 2022-10-15 NOTE — OB NEONATOLOGY/PEDIATRICIAN DELIVERY SUMMARY - NSPEDSNEONOTESA_OBGYN_ALL_OB_FT
36wk male born via CS to a 27y/o  blood type O+ mother. Maternal history of +Varicella IgM in Aug 2022. Prenatal history of FA for fetal cardiac anomalies and Trisomy 13. PNL -/-/NR/I, GBS + on 10/3. SROM at 23:52 on 10/14 with clear fluids. Peds called for FA and trisomy 13. Baby emerged vigorous, crying, was w/d/s/s with APGARS of 9/9. Mom plans to initiate breastfeeding, consents Hep B vaccine and declines circ.  EOS 0.16.  Highest maternal temp 36.8.    BW:  :  TOB:    Physical Exam:  Gen: NAD, +grimace  HEENT: anterior fontanel open soft and flat, no cleft lip/palate, ears normal set, no ear pits or tags. no lesions in mouth/throat, nares clinically patent  Resp: no increased work of breathing, good air entry b/l, clear to auscultation bilaterally  Cardio: Normal S1/S2, regular rate and rhythm, no murmurs, rubs or gallops  Abd: soft, non tender, non distended, + bowel sounds, umbilical cord with 3 vessels  Neuro: +grasp/suck/tim, normal tone  Extremities: +rocker bottom feet, negative garnica and ortolani, moving all extremities, full range of motion x 4, no crepitus  Skin: pink, warm  Genitals: Normal male anatomy, testicles palpable in scrotum b/l, Lopez 1, anus patent 36wk male born via CS to a 29y/o  blood type O+ mother. Maternal history of +Varicella IgM in Aug 2022. Prenatal history of FA for fetal cardiac anomalies and Trisomy 13. PNL -/-/NR/I, GBS + on 10/3, Ampicillin x4. SROM at 23:52 on 10/14 with clear fluids. Peds called for FA and trisomy 13. Baby emerged vigorous, crying, was w/d/s/s with APGARS of 9/9. Mom plans to initiate breastfeeding, consents Hep B vaccine and declines circ.  EOS 0.16.  Highest maternal temp 36.8. Admit to NICU for fetal anomalies.    Physical Exam:  Gen: NAD, +grimace  HEENT: anterior fontanel open soft and flat, no cleft lip/palate, ears normal set, no ear pits or tags. no lesions in mouth/throat, nares clinically patent  Resp: no increased work of breathing, good air entry b/l, clear to auscultation bilaterally  Cardio: Normal S1/S2, regular rate and rhythm, no murmurs, rubs or gallops  Abd: soft, non tender, non distended, + bowel sounds, umbilical cord with 3 vessels  Neuro: +grasp/suck/tim, normal tone  Extremities: +rocker bottom feet, negative garnica and ortolani, moving all extremities, full range of motion x 4, no crepitus  Skin: pink, warm  Genitals: Normal male anatomy, testicles palpable in scrotum b/l, Lopez 1, anus patent

## 2022-10-15 NOTE — OB RN DELIVERY SUMMARY - NSSELHIDDEN_OBGYN_ALL_OB_FT
[NS_DeliveryAttending1_OBGYN_ALL_OB_FT:[id number]],[NS_DeliveryAssist1_OBGYN_ALL_OB_FT:[id number]=],[NS_DeliveryRN_OBGYN_ALL_OB_FT:[id number]=]

## 2022-10-15 NOTE — OB PROVIDER DELIVERY SUMMARY - NSSELHIDDEN_OBGYN_ALL_OB_FT
[NS_DeliveryAttending1_OBGYN_ALL_OB_FT:[id number]=],[NS_DeliveryAssist1_OBGYN_ALL_OB_FT:[id number]=]

## 2022-10-15 NOTE — OB RN INTRAOPERATIVE NOTE - NSSELHIDDEN_OBGYN_ALL_OB_FT
[NS_DeliveryAttending1_OBGYN_ALL_OB_FT:[id number]=],[NS_DeliveryAssist1_OBGYN_ALL_OB_FT:[id number]=],[NS_DeliveryRN_OBGYN_ALL_OB_FT:[id number]=]

## 2022-10-15 NOTE — OB PROVIDER LABOR PROGRESS NOTE - ASSESSMENT
A/P: IOL severe IUGR w/ iAEDV/REDV. Pt currently has amnioinfusion with no improvement in FHR tracing. No cervical change, head not in pelvis, ballotable. Discussed category 2 tracing with patient and possible need for  section if decelerations continue. Discussed less reserve with severe IUGR fetus. Patient desires  section at this time. Discussed risks vs. benefits of  including infection, damage to surrounding structures, bleeding. Patient understands all risks and signs consent.    KUMAR Briceno, PGY-4  d/w Dr. Alatorre
CB in place  Plan for pitocin   Continuous EFM/toco  Consider c/s if FHT nonreassuring  NICU consulted - plan to deliver in OR adjacent to NSU    d/w BRADLY Taveras PGY4  KENIA Ferris PGY1
A/P: IOL IUGR w/ iAEDV, REDV, mosaic T13    - CB out  - SROM clear fluid  - 1 more dose of BC, then transition to pitocin    CNess Briceno, PGY-4  d/w Dr. Jackson

## 2022-10-15 NOTE — OB RN DELIVERY SUMMARY - NS_SEPSISRSKCALC_OBGYN_ALL_OB_FT
EOS calculated successfully. EOS Risk Factor: 0.5/1000 live births ([facility] national incidence); GA=36w1d; Temp=98.24; ROM=6.15; GBS='Positive'; Antibiotics='GBS specific antibiotics > 2 hrs prior to birth'

## 2022-10-16 LAB
BASOPHILS # BLD AUTO: 0.05 K/UL — SIGNIFICANT CHANGE UP (ref 0–0.2)
BASOPHILS NFR BLD AUTO: 0.3 % — SIGNIFICANT CHANGE UP (ref 0–2)
EOSINOPHIL # BLD AUTO: 0.13 K/UL — SIGNIFICANT CHANGE UP (ref 0–0.5)
EOSINOPHIL NFR BLD AUTO: 0.7 % — SIGNIFICANT CHANGE UP (ref 0–6)
HCT VFR BLD CALC: 22.4 % — LOW (ref 34.5–45)
HCT VFR BLD CALC: 22.4 % — LOW (ref 34.5–45)
HGB BLD-MCNC: 7.1 G/DL — LOW (ref 11.5–15.5)
HGB BLD-MCNC: 7.2 G/DL — LOW (ref 11.5–15.5)
IANC: 14.35 K/UL — HIGH (ref 1.8–7.4)
IMM GRANULOCYTES NFR BLD AUTO: 1.5 % — HIGH (ref 0–0.9)
LYMPHOCYTES # BLD AUTO: 12.2 % — LOW (ref 13–44)
LYMPHOCYTES # BLD AUTO: 2.18 K/UL — SIGNIFICANT CHANGE UP (ref 1–3.3)
MCHC RBC-ENTMCNC: 26.4 PG — LOW (ref 27–34)
MCHC RBC-ENTMCNC: 26.4 PG — LOW (ref 27–34)
MCHC RBC-ENTMCNC: 31.7 GM/DL — LOW (ref 32–36)
MCHC RBC-ENTMCNC: 32.1 GM/DL — SIGNIFICANT CHANGE UP (ref 32–36)
MCV RBC AUTO: 82.1 FL — SIGNIFICANT CHANGE UP (ref 80–100)
MCV RBC AUTO: 83.3 FL — SIGNIFICANT CHANGE UP (ref 80–100)
MONOCYTES # BLD AUTO: 0.87 K/UL — SIGNIFICANT CHANGE UP (ref 0–0.9)
MONOCYTES NFR BLD AUTO: 4.9 % — SIGNIFICANT CHANGE UP (ref 2–14)
NEUTROPHILS # BLD AUTO: 14.35 K/UL — HIGH (ref 1.8–7.4)
NEUTROPHILS NFR BLD AUTO: 80.4 % — HIGH (ref 43–77)
NRBC # BLD: 0 /100 WBCS — SIGNIFICANT CHANGE UP (ref 0–0)
NRBC # BLD: 0 /100 WBCS — SIGNIFICANT CHANGE UP (ref 0–0)
NRBC # FLD: 0 K/UL — SIGNIFICANT CHANGE UP (ref 0–0)
NRBC # FLD: 0 K/UL — SIGNIFICANT CHANGE UP (ref 0–0)
PLATELET # BLD AUTO: 218 K/UL — SIGNIFICANT CHANGE UP (ref 150–400)
PLATELET # BLD AUTO: 223 K/UL — SIGNIFICANT CHANGE UP (ref 150–400)
RBC # BLD: 2.69 M/UL — LOW (ref 3.8–5.2)
RBC # BLD: 2.73 M/UL — LOW (ref 3.8–5.2)
RBC # FLD: 15.7 % — HIGH (ref 10.3–14.5)
RBC # FLD: 15.8 % — HIGH (ref 10.3–14.5)
WBC # BLD: 17.85 K/UL — HIGH (ref 3.8–10.5)
WBC # BLD: 17.92 K/UL — HIGH (ref 3.8–10.5)
WBC # FLD AUTO: 17.85 K/UL — HIGH (ref 3.8–10.5)
WBC # FLD AUTO: 17.92 K/UL — HIGH (ref 3.8–10.5)

## 2022-10-16 RX ORDER — FERROUS SULFATE 325(65) MG
325 TABLET ORAL THREE TIMES A DAY
Refills: 0 | Status: DISCONTINUED | OUTPATIENT
Start: 2022-10-16 | End: 2022-10-18

## 2022-10-16 RX ORDER — ASCORBIC ACID 60 MG
500 TABLET,CHEWABLE ORAL DAILY
Refills: 0 | Status: DISCONTINUED | OUTPATIENT
Start: 2022-10-16 | End: 2022-10-18

## 2022-10-16 RX ORDER — IBUPROFEN 200 MG
600 TABLET ORAL EVERY 6 HOURS
Refills: 0 | Status: DISCONTINUED | OUTPATIENT
Start: 2022-10-16 | End: 2022-10-18

## 2022-10-16 RX ORDER — MORPHINE SULFATE 50 MG/1
2 CAPSULE, EXTENDED RELEASE ORAL ONCE
Refills: 0 | Status: DISCONTINUED | OUTPATIENT
Start: 2022-10-16 | End: 2022-10-16

## 2022-10-16 RX ADMIN — Medication 30 MILLIGRAM(S): at 05:36

## 2022-10-16 RX ADMIN — MAGNESIUM HYDROXIDE 30 MILLILITER(S): 400 TABLET, CHEWABLE ORAL at 13:40

## 2022-10-16 RX ADMIN — HEPARIN SODIUM 5000 UNIT(S): 5000 INJECTION INTRAVENOUS; SUBCUTANEOUS at 18:22

## 2022-10-16 RX ADMIN — Medication 500 MILLIGRAM(S): at 18:22

## 2022-10-16 RX ADMIN — Medication 325 MILLIGRAM(S): at 18:22

## 2022-10-16 RX ADMIN — Medication 600 MILLIGRAM(S): at 18:22

## 2022-10-16 RX ADMIN — Medication 600 MILLIGRAM(S): at 13:00

## 2022-10-16 RX ADMIN — Medication 30 MILLIGRAM(S): at 00:45

## 2022-10-16 RX ADMIN — HEPARIN SODIUM 5000 UNIT(S): 5000 INJECTION INTRAVENOUS; SUBCUTANEOUS at 05:36

## 2022-10-16 RX ADMIN — Medication 30 MILLIGRAM(S): at 06:32

## 2022-10-16 RX ADMIN — Medication 600 MILLIGRAM(S): at 12:14

## 2022-10-16 NOTE — PROGRESS NOTE ADULT - ATTENDING COMMENTS
Pt seen and examined.  Pt with no complaints at the time of evaluation.  Pt denies HA, CP, SOB, calf pain, fevers/chills.  Pt denies feeling dizzy or lightheaded with ambulation.  Pt also denies feeling any palpitations.  Pt tolerating reg diet, -N/-V today.  Awaiting flatus/ -BM.  Pt voiding and ambulating without difficulty.     ICU Vital Signs Last 24 Hrs  T(C): 37.1 (16 Oct 2022 06:19), Max: 37.2 (16 Oct 2022 02:15)  T(F): 98.7 (16 Oct 2022 06:19), Max: 98.9 (16 Oct 2022 02:15)  HR: 100 (16 Oct 2022 06:19) (98 - 108)  BP: 121/68 (16 Oct 2022 06:19) (110/67 - 127/78)  BP(mean): --  ABP: --  ABP(mean): --  RR: 18 (16 Oct 2022 06:19) (18 - 18)  SpO2: 99% (16 Oct 2022 06:19) (99% - 100%)    hct 34.6-->22.4 asymptomatic    General: NAD sitting comfortably on couch  Abd: softly distended, tympanic to percussion, fundus firm  Incision:C/D/I with dermabond  Ext: no calf tenderness b/l      POD #1 sp PLTCS 2/2 cat 2 FHT, baby in NICU for ? cardiac anomalies in utero- per pt, baby has had echo x 2 which have both been normal, awaiting third echo today per pt.      1.  Anemia - likely 2/2 acute blood loss at time of c/s.  Hct 34.6-->22.4 pt asymptomatic, VSS.  Will repeat h/h at noon.  Will start iron supplement with vit c.      2.  Awaiting flatus- encourage ambulation when hct confirmed to be stable    3.  Continue routine postop and pp care    Mera Jackson MD

## 2022-10-16 NOTE — PROGRESS NOTE ADULT - ASSESSMENT
A/P: 28yo POD#1 s/p pLTCS for Cat II tracing, . Infant in NICU for known cardiac defects. Patient is stable and doing well post-operatively.    - Continue regular diet.  - Increase ambulation.  - Continue motrin, tylenol, oxycodone PRN for pain control  - Hct: 34.6->22.4  - Post placental Mirena placed intraoperatively, Lot# [id number]    Chelsea Ford MD PGY1

## 2022-10-16 NOTE — PROGRESS NOTE ADULT - SUBJECTIVE AND OBJECTIVE BOX
OB Progress Note:  Delivery, POD#1    S: 28yo POD#1 s/p pLTCS for Cat II tracing, . Infant in NICU for known cardiac defects. Her pain is well controlled. She is tolerating a regular diet and not yet passing flatus. Denies N/V. Denies CP/SOB/lightheadedness/dizziness.   She is ambulating without difficulty.   Voiding spontaneously.     O:   Vital Signs Last 24 Hrs  T(C): 37.1 (16 Oct 2022 06:19), Max: 37.2 (16 Oct 2022 02:15)  T(F): 98.7 (16 Oct 2022 06:19), Max: 98.9 (16 Oct 2022 02:15)  HR: 100 (16 Oct 2022 06:19) (98 - 108)  BP: 121/68 (16 Oct 2022 06:19) (110/67 - 127/78)  RR: 18 (16 Oct 2022 06:19) (18 - 18)  SpO2: 99% (16 Oct 2022 06:19) (99% - 100%)    Parameters below as of 16 Oct 2022 06:19  Patient On (Oxygen Delivery Method): room air    Labs:  Blood type: O Positive  Rubella IgG: RPR: Negative                          7.2<L>   17.85<H> >-----------< 218    ( 10-16 @ 06:20 )             22.4<L>                        11.2<L>   16.86<H> >-----------< 273    ( 10-14 @ 16:30 )             34.6    PE:  General: NAD  Abdomen: Mildly distended, appropriately tender, incision c/d/i with dermabond  Extremities: No erythema, no pitting edema

## 2022-10-17 ENCOUNTER — APPOINTMENT (OUTPATIENT)
Dept: OBGYN | Facility: HOSPITAL | Age: 27
End: 2022-10-17

## 2022-10-17 ENCOUNTER — TRANSCRIPTION ENCOUNTER (OUTPATIENT)
Age: 27
End: 2022-10-17

## 2022-10-17 ENCOUNTER — APPOINTMENT (OUTPATIENT)
Dept: MATERNAL FETAL MEDICINE | Facility: HOSPITAL | Age: 27
End: 2022-10-17

## 2022-10-17 ENCOUNTER — NON-APPOINTMENT (OUTPATIENT)
Age: 27
End: 2022-10-17

## 2022-10-17 RX ORDER — ASCORBIC ACID 60 MG
2 TABLET,CHEWABLE ORAL
Qty: 60 | Refills: 0
Start: 2022-10-17 | End: 2022-11-15

## 2022-10-17 RX ORDER — ASCORBIC ACID 60 MG
1 TABLET,CHEWABLE ORAL
Qty: 30 | Refills: 0
Start: 2022-10-17 | End: 2022-11-15

## 2022-10-17 RX ORDER — IBUPROFEN 200 MG
1 TABLET ORAL
Qty: 0 | Refills: 0 | DISCHARGE
Start: 2022-10-17

## 2022-10-17 RX ORDER — FERROUS SULFATE 325(65) MG
1 TABLET ORAL
Qty: 90 | Refills: 0
Start: 2022-10-17 | End: 2022-11-15

## 2022-10-17 RX ORDER — OXYCODONE HYDROCHLORIDE 5 MG/1
5 TABLET ORAL
Refills: 0 | Status: DISCONTINUED | OUTPATIENT
Start: 2022-10-17 | End: 2022-10-18

## 2022-10-17 RX ORDER — IRON,CARBONYL 45 MG
1 TABLET ORAL
Qty: 30 | Refills: 0
Start: 2022-10-17 | End: 2022-11-15

## 2022-10-17 RX ADMIN — Medication 600 MILLIGRAM(S): at 18:00

## 2022-10-17 RX ADMIN — HEPARIN SODIUM 5000 UNIT(S): 5000 INJECTION INTRAVENOUS; SUBCUTANEOUS at 17:30

## 2022-10-17 RX ADMIN — Medication 600 MILLIGRAM(S): at 05:24

## 2022-10-17 RX ADMIN — Medication 600 MILLIGRAM(S): at 06:18

## 2022-10-17 RX ADMIN — OXYCODONE HYDROCHLORIDE 5 MILLIGRAM(S): 5 TABLET ORAL at 21:15

## 2022-10-17 RX ADMIN — Medication 600 MILLIGRAM(S): at 17:30

## 2022-10-17 RX ADMIN — Medication 600 MILLIGRAM(S): at 00:23

## 2022-10-17 RX ADMIN — Medication 600 MILLIGRAM(S): at 12:48

## 2022-10-17 RX ADMIN — Medication 600 MILLIGRAM(S): at 12:18

## 2022-10-17 RX ADMIN — OXYCODONE HYDROCHLORIDE 5 MILLIGRAM(S): 5 TABLET ORAL at 22:15

## 2022-10-17 RX ADMIN — Medication 500 MILLIGRAM(S): at 12:18

## 2022-10-17 RX ADMIN — Medication 325 MILLIGRAM(S): at 01:51

## 2022-10-17 RX ADMIN — Medication 600 MILLIGRAM(S): at 01:20

## 2022-10-17 RX ADMIN — HEPARIN SODIUM 5000 UNIT(S): 5000 INJECTION INTRAVENOUS; SUBCUTANEOUS at 05:24

## 2022-10-17 NOTE — DISCHARGE NOTE OB - COMMUNITY RESOURCE NAME:
Patient instructed to make a follow up appointment at [facility] at [facility], [phone] for 2-3 weeks from delivery date. Patient also instructed to make a follow up appointment for the baby at [facility], Division of General Pediatrics, [phone] for 1-2 days from discharge date.

## 2022-10-17 NOTE — DISCHARGE NOTE OB - KEGEL (VAGINAL TIGHTENING) EXERCISES TO PROMOTE HEALING
Patient settled in room 3023 in stable condition. Report received from Rina. Bands verified with mom and dad. Pediatrician verified with mom and dad, Bhavna   Statement Selected

## 2022-10-17 NOTE — DISCHARGE NOTE OB - PROVIDER TOKENS
FREE:[LAST:[[facility]],PHONE:[phone]],FAX:[(   [phone]    -],ADDRESS:[Ambulatory Care Unit  [facility]  Level C],FOLLOWUP:[2 weeks],ESTABLISHEDPATIENT:[T]]

## 2022-10-17 NOTE — PROGRESS NOTE ADULT - SUBJECTIVE AND OBJECTIVE BOX
OB Postpartum Note:  Delivery    S: 26yo now POD #2 s/p pLTCS. Her pain is well controlled. She is tolerating a regular diet and is passing flatus. Voiding spontaneously and ambulating without difficulty. Denies N/V. Denies CP/SOB/lightheadedness/dizziness.     O:   Vitals:  Vital Signs Last 24 Hrs  T(C): 36.7 (16 Oct 2022 21:23), Max: 37.1 (16 Oct 2022 06:19)  T(F): 98.1 (16 Oct 2022 21:23), Max: 98.8 (16 Oct 2022 13:43)  HR: 97 (16 Oct 2022 21:23) (97 - 103)  BP: 120/73 (16 Oct 2022 21:23) (120/73 - 128/77)  BP(mean): --  RR: 18 (16 Oct 2022 21:23) (18 - 18)  SpO2: 100% (16 Oct 2022 21:23) (99% - 100%)    Parameters below as of 16 Oct 2022 06:19  Patient On (Oxygen Delivery Method): room air        MEDICATIONS  (STANDING):  ascorbic acid 500 milliGRAM(s) Oral daily  chlorhexidine 2% Cloths 1 Application(s) Topical once  citric acid/sodium citrate Solution 15 milliLiter(s) Oral every 6 hours  diphtheria/tetanus/pertussis (acellular) Vaccine (ADAcel) 0.5 milliLiter(s) IntraMuscular once  ferrous    sulfate 325 milliGRAM(s) Oral three times a day  heparin   Injectable 5000 Unit(s) SubCutaneous every 12 hours  ibuprofen  Tablet. 600 milliGRAM(s) Oral every 6 hours  influenza   Vaccine 0.5 milliLiter(s) IntraMuscular once  lactated ringers Bolus 1000 milliLiter(s) IV Bolus once  lactated ringers. 1000 milliLiter(s) (125 mL/Hr) IV Continuous <Continuous>  lactated ringers. 1000 milliLiter(s) (75 mL/Hr) IV Continuous <Continuous>  oxytocin Infusion 333.333 milliUNIT(s)/Min (1000 mL/Hr) IV Continuous <Continuous>  oxytocin Infusion 333.333 milliUNIT(s)/Min (1000 mL/Hr) IV Continuous <Continuous>  oxytocin Infusion.  milliUNIT(s)/Min (2 mL/Hr) IV Continuous <Continuous>    MEDICATIONS  (PRN):  dexAMETHasone  Injectable 4 milliGRAM(s) IV Push every 6 hours PRN Nausea  diphenhydrAMINE 25 milliGRAM(s) Oral every 6 hours PRN Pruritus  lanolin Ointment 1 Application(s) Topical every 6 hours PRN Sore Nipples  magnesium hydroxide Suspension 30 milliLiter(s) Oral two times a day PRN Constipation  nalbuphine Injectable 2.5 milliGRAM(s) IV Push every 6 hours PRN Pruritus  naloxone Injectable 0.1 milliGRAM(s) IV Push every 3 minutes PRN For ANY of the following changes in patient status:  A. Breaths Per Minute LESS THAN 10, B. Oxygen saturation LESS THAN 90%, C. Sedation score of 6 for Stop After: 4 Times  ondansetron Injectable 4 milliGRAM(s) IV Push every 6 hours PRN Nausea  oxyCODONE    IR 5 milliGRAM(s) Oral every 3 hours PRN Moderate to Severe Pain (4-10)  oxyCODONE    IR 5 milliGRAM(s) Oral once PRN Moderate to Severe Pain (4-10)  simethicone 80 milliGRAM(s) Chew every 4 hours PRN Gas      Labs:  Blood type: O Positive  Rubella IgG: RPR: Negative                          7.1<L>   17.92<H> >-----------< 223    ( 10-16 @ 12:35 )             22.4<L>                        7.2<L>   17.85<H> >-----------< 218    ( 10-16 @ 06:20 )             22.4<L>                        11.2<L>   16.86<H> >-----------< 273    ( 10-14 @ 16:30 )             34.6                  PE:  General: NAD, patient resting comfortably in bed  Abdomen: Mildly distended, appropriately tender. Fundus firm.  Incision: Clean, dry, intact.  : appropriate amount of lochia on pad  Extremities: SCDs in place, no erythema OB Postpartum Note:  Delivery    S: 26yo now POD #2 s/p pLTCS. Her pain is well controlled. She is tolerating a regular diet and is passing flatus. Voiding spontaneously and ambulating without difficulty. Denies N/V. Denies CP/SOB/lightheadedness/dizziness.     O:   Vitals:  Vital Signs Last 24 Hrs  T(C): 36.7 (16 Oct 2022 21:23), Max: 37.1 (16 Oct 2022 06:19)  T(F): 98.1 (16 Oct 2022 21:23), Max: 98.8 (16 Oct 2022 13:43)  HR: 97 (16 Oct 2022 21:23) (97 - 103)  BP: 120/73 (16 Oct 2022 21:23) (120/73 - 128/77)  BP(mean): --  RR: 18 (16 Oct 2022 21:23) (18 - 18)  SpO2: 100% (16 Oct 2022 21:23) (99% - 100%)    Parameters below as of 16 Oct 2022 06:19  Patient On (Oxygen Delivery Method): room air        MEDICATIONS  (STANDING):  ascorbic acid 500 milliGRAM(s) Oral daily  chlorhexidine 2% Cloths 1 Application(s) Topical once  citric acid/sodium citrate Solution 15 milliLiter(s) Oral every 6 hours  diphtheria/tetanus/pertussis (acellular) Vaccine (ADAcel) 0.5 milliLiter(s) IntraMuscular once  ferrous    sulfate 325 milliGRAM(s) Oral three times a day  heparin   Injectable 5000 Unit(s) SubCutaneous every 12 hours  ibuprofen  Tablet. 600 milliGRAM(s) Oral every 6 hours  influenza   Vaccine 0.5 milliLiter(s) IntraMuscular once  lactated ringers Bolus 1000 milliLiter(s) IV Bolus once  lactated ringers. 1000 milliLiter(s) (125 mL/Hr) IV Continuous <Continuous>  lactated ringers. 1000 milliLiter(s) (75 mL/Hr) IV Continuous <Continuous>  oxytocin Infusion 333.333 milliUNIT(s)/Min (1000 mL/Hr) IV Continuous <Continuous>  oxytocin Infusion 333.333 milliUNIT(s)/Min (1000 mL/Hr) IV Continuous <Continuous>  oxytocin Infusion.  milliUNIT(s)/Min (2 mL/Hr) IV Continuous <Continuous>    MEDICATIONS  (PRN):  dexAMETHasone  Injectable 4 milliGRAM(s) IV Push every 6 hours PRN Nausea  diphenhydrAMINE 25 milliGRAM(s) Oral every 6 hours PRN Pruritus  lanolin Ointment 1 Application(s) Topical every 6 hours PRN Sore Nipples  magnesium hydroxide Suspension 30 milliLiter(s) Oral two times a day PRN Constipation  nalbuphine Injectable 2.5 milliGRAM(s) IV Push every 6 hours PRN Pruritus  naloxone Injectable 0.1 milliGRAM(s) IV Push every 3 minutes PRN For ANY of the following changes in patient status:  A. Breaths Per Minute LESS THAN 10, B. Oxygen saturation LESS THAN 90%, C. Sedation score of 6 for Stop After: 4 Times  ondansetron Injectable 4 milliGRAM(s) IV Push every 6 hours PRN Nausea  oxyCODONE    IR 5 milliGRAM(s) Oral every 3 hours PRN Moderate to Severe Pain (4-10)  oxyCODONE    IR 5 milliGRAM(s) Oral once PRN Moderate to Severe Pain (4-10)  simethicone 80 milliGRAM(s) Chew every 4 hours PRN Gas      Labs:  Blood type: O Positive  Rubella IgG: RPR: Negative                          7.1<L>   17.92<H> >-----------< 223    ( 10-16 @ 12:35 )             22.4<L>                        7.2<L>   17.85<H> >-----------< 218    ( 10-16 @ 06:20 )             22.4<L>                        11.2<L>   16.86<H> >-----------< 273    ( 10-14 @ 16:30 )             34.6                  PE:  General: NAD, patient resting comfortably in bed  Abdomen: Mildly distended, appropriately tender. Fundus firm.  Incision: Clean, dry, intact.  : appropriate amount of lochia on pad  Extremities: bilateral lower extremity +1 pitting edema to knees, no erythema

## 2022-10-17 NOTE — DISCHARGE NOTE OB - CARE PLAN
Principal Discharge DX:	Single delivery by  section  Assessment and plan of treatment:	Make your follow-up appointment with your doctor as ordered in two weeks for wound check. No heavy lifting, driving, or strenuous activity for 6 weeks. Nothing per vagina such as tampons, intercourse, douches or tub baths for 6 weeks or until you see your doctor. Call your doctor with any signs and symptoms of infection such as fever, chills, nausea or vomiting. Call your doctor with redness or swelling at the incision site, fluid leakage or wound separation. Call your doctor if you’re unable to tolerate food, increase in vaginal bleeding, or have difficulty urinating. Call your doctor if you have pain that is not relieved by your prescribed medications. Notify your doctor with any other concerns. Call [phone] if you have any of these concerns in the next 6 weeks.   1

## 2022-10-17 NOTE — PROGRESS NOTE ADULT - ATTENDING COMMENTS
Associate Chief of L & D ( late entry)     I have met this patient for the first time today.  She was transferred from [facility] 10/14/22 with known mosaic trisomy 13, IUGR, iADEV/REDV and fetal tachycardia and was admitted and delivered by Dr Lio CORADO Progress Note:  Delivery, POD#2    S: 26yo POD#2 s/p LTCS . Patient denies any complaints this AM    O:   Vital Signs Last 24 Hrs  T(C): 36.7 (17 Oct 2022 06:23), Max: 37.1 (16 Oct 2022 13:43)  T(F): 98.1 (17 Oct 2022 06:23), Max: 98.8 (16 Oct 2022 13:43)  HR: 89 (17 Oct 2022 06:23) (89 - 103)  BP: 114/70 (17 Oct 2022 06:23) (114/70 - 128/77)  RR: 18 (17 Oct 2022 06:23) (18 - 18)  SpO2: 100% (17 Oct 2022 06:23) (100% - 100%)      Labs:  Blood type: O Positive  Rubella IgG: RPR: Negative                          7.1<L>   17.92<H> >-----------< 223    ( 10-16 @ 12:35 )             22.4<L>                        7.2<L>   17.85<H> >-----------< 218    ( 10-16 @ 06:20 )             22.4<L>                        11.2<L>   16.86<H> >-----------< 273    ( 10-14 @ 16:30 )             34.6        PE:  Abdomen: Mildly distended, appropriately tender  incision c/d/i.  Extremities: No erythema, no pitting edema    A/P: 26yo POD#2 s/p LTCS due to CAT II  who was transferred from [facility] with  known mosaic trisomy 13, IUGR, iADEV/REDV and fetal tachycardia    - Continue regular diet.  - Increase ambulation.  - Continue Motrin, Tylenol, oxycodone PRN for pain control.  vs. continue PCEA for pain.  - Answered all questions of the patient     Hallie Macario M.D., M.B.A., M.S. Associate Chief of L & D ( late entry)     I have met this patient for the first time today.  She was transferred from [facility] at 36+ weeks gestation on 10/14/22 with known mosaic trisomy 13, IUGR, iADEV/REDV and fetal tachycardia and was admitted and delivered by Dr Lio CORADO Progress Note:  Delivery, POD#2    S: 28yo POD#2 s/p LTCS . Patient denies any complaints this AM    O:   Vital Signs Last 24 Hrs  T(C): 36.7 (17 Oct 2022 06:23), Max: 37.1 (16 Oct 2022 13:43)  T(F): 98.1 (17 Oct 2022 06:23), Max: 98.8 (16 Oct 2022 13:43)  HR: 89 (17 Oct 2022 06:23) (89 - 103)  BP: 114/70 (17 Oct 2022 06:23) (114/70 - 128/77)  RR: 18 (17 Oct 2022 06:23) (18 - 18)  SpO2: 100% (17 Oct 2022 06:23) (100% - 100%)      Labs:  Blood type: O Positive  Rubella IgG: RPR: Negative                          7.1<L>   17.92<H> >-----------< 223    ( 10-16 @ 12:35 )             22.4<L>                        7.2<L>   17.85<H> >-----------< 218    ( 10-16 @ 06:20 )             22.4<L>                        11.2<L>   16.86<H> >-----------< 273    ( 10-14 @ 16:30 )             34.6        PE:  Abdomen: Mildly distended, appropriately tender  incision c/d/i.  Extremities: No erythema, no pitting edema    A/P: 28yo POD#2 s/p LTCS due to CAT II  who was transferred  at 36+ weeks gestation  from [facility] with  known mosaic trisomy 13, IUGR, iADEV/REDV and fetal tachycardia    - Continue regular diet.  - Increase ambulation.  - Continue Motrin, Tylenol, oxycodone PRN for pain control.  vs. continue PCEA for pain.  - Answered all questions of the patient     Hallie Macario M.D., M.B.DIONTE., M.S. Associate Chief of L & D ( late entry)     I have met this patient for the first time today.  She was transferred from [facility] at 36+ weeks gestation on 10/14/22 with known mosaic trisomy 13, IUGR, iADEV/REDV and fetal tachycardia and was admitted and delivered by Dr Lio CORADO Progress Note:  Delivery, POD#2    S: 28yo POD#2 s/p LTCS . Patient denies any complaints this AM    O:   Vital Signs Last 24 Hrs  T(C): 36.7 (17 Oct 2022 06:23), Max: 37.1 (16 Oct 2022 13:43)  T(F): 98.1 (17 Oct 2022 06:23), Max: 98.8 (16 Oct 2022 13:43)  HR: 89 (17 Oct 2022 06:23) (89 - 103)  BP: 114/70 (17 Oct 2022 06:23) (114/70 - 128/77)  RR: 18 (17 Oct 2022 06:23) (18 - 18)  SpO2: 100% (17 Oct 2022 06:23) (100% - 100%)      Labs:  Blood type: O Positive  Rubella IgG: RPR: Negative                          7.1<L>   17.92<H> >-----------< 223    ( 10-16 @ 12:35 )             22.4<L>                        7.2<L>   17.85<H> >-----------< 218    ( 10-16 @ 06:20 )             22.4<L>                        11.2<L>   16.86<H> >-----------< 273    ( 10-14 @ 16:30 )             34.6        PE:  Abdomen: Mildly distended, appropriately tender  incision c/d/i.  Lochia scant   Extremities: No erythema, +1 pitting edema    A/P: 28yo POD#2 s/p LTCS due to CAT II  who was transferred  at 36+ weeks gestation  from [facility] with  known mosaic trisomy 13, IUGR, iADEV/REDV and fetal tachycardia    - Continue regular diet.  - Increase ambulation.  - Continue Motrin, Tylenol, oxycodone PRN for pain control.  vs. continue PCEA for pain.  - Answered all questions of the patient   - Spoke with Tierra AGUIRRE who stated that the patient does not require any further intervention at this time    Hallie Macario M.D., M.B.A., M.S.

## 2022-10-17 NOTE — DISCHARGE NOTE OB - PROCEDURE 1
78M brought by EMS as level 1 trauma from [facility]. He was found down and per EMS reports had altered mental status. The fall was not witnessed and mechanism not known. Intubation was attempted by EMS because patient was not responsive and was unsuccessful. Level 1 trauma called after arrival.    Patient was intubated for GCS 4. Primary survey intact and secondary survey without injuries identified. He was taken immediately to CT and SICU.
 section

## 2022-10-17 NOTE — DISCHARGE NOTE OB - PLAN OF CARE
Make your follow-up appointment with your doctor as ordered in two weeks for wound check. No heavy lifting, driving, or strenuous activity for 6 weeks. Nothing per vagina such as tampons, intercourse, douches or tub baths for 6 weeks or until you see your doctor. Call your doctor with any signs and symptoms of infection such as fever, chills, nausea or vomiting. Call your doctor with redness or swelling at the incision site, fluid leakage or wound separation. Call your doctor if you’re unable to tolerate food, increase in vaginal bleeding, or have difficulty urinating. Call your doctor if you have pain that is not relieved by your prescribed medications. Notify your doctor with any other concerns. Call [phone] if you have any of these concerns in the next 6 weeks.

## 2022-10-17 NOTE — DISCHARGE NOTE OB - HOSPITAL COURSE
Patient was originally sent from ATU for induction for iAEDV/REDV in setting of IGUR and mosaic T13. Patient ultimately underwent an uncomplicated primary LTCS for Cat II tracing and known fetal cardiac anomalies. [id number]. Patient’s postoperative course was unremarkable and she remained hemodynamically stable and afebrile throughout. Upon discharge on POD3, the patient is ambulating and voiding spontaneously, tolerating oral intake, pain was well controlled with oral medication, and vital signs were stable.

## 2022-10-17 NOTE — PROGRESS NOTE ADULT - ASSESSMENT
A/P: 28yo POD#2 s/p pLTCS for Cat II tracing, . Infant in NICU for known cardiac defects. Patient is stable and doing well post-operatively.     #Post partum care   - Continue regular diet.  - Increase ambulation.  - Continue motrin, tylenol, oxycodone PRN for pain control  - Post placental Mirena placed intraoperatively, Lot# [id number]    Rosita Mehta MD PGY1

## 2022-10-17 NOTE — DISCHARGE NOTE OB - NS MD DC FALL RISK RISK
For information on Fall & Injury Prevention, visit: [email or web address] OR  [email or web address] OR  https://www.cdc.gov/steadi/patient.html

## 2022-10-17 NOTE — DISCHARGE NOTE OB - PATIENT PORTAL LINK FT
You can access the FollowMyHealth Patient Portal offered by [facility] by registering at the following website: [email or web address]. By joining [employer]’s FollowMyHealth portal, you will also be able to view your health information using other applications (apps) compatible with our system.

## 2022-10-17 NOTE — DISCHARGE NOTE OB - BREAST MILK PROVIDES PROTECTION AGAINST INFECTION
Spoke to patient about negative pregnancy test last week. Discussed that she is having pregnancy-related symptoms (nausea, tender breasts, etc) and is now a week late with her period. HCG was ordered as requested. She states her understanding and denies any questions at this time.    Statement Selected

## 2022-10-18 VITALS
DIASTOLIC BLOOD PRESSURE: 68 MMHG | SYSTOLIC BLOOD PRESSURE: 114 MMHG | OXYGEN SATURATION: 100 % | TEMPERATURE: 98 F | HEART RATE: 79 BPM | RESPIRATION RATE: 19 BRPM

## 2022-10-18 LAB
BASOPHILS # BLD AUTO: 0 K/UL — SIGNIFICANT CHANGE UP (ref 0–0.2)
BASOPHILS NFR BLD AUTO: 0 % — SIGNIFICANT CHANGE UP (ref 0–2)
EOSINOPHIL # BLD AUTO: 0 K/UL — SIGNIFICANT CHANGE UP (ref 0–0.5)
EOSINOPHIL NFR BLD AUTO: 0 % — SIGNIFICANT CHANGE UP (ref 0–6)
HCT VFR BLD CALC: 24.3 % — LOW (ref 34.5–45)
HGB BLD-MCNC: 7.7 G/DL — LOW (ref 11.5–15.5)
IANC: 10.11 K/UL — HIGH (ref 1.8–7.4)
LYMPHOCYTES # BLD AUTO: 17.4 % — SIGNIFICANT CHANGE UP (ref 13–44)
LYMPHOCYTES # BLD AUTO: 2.59 K/UL — SIGNIFICANT CHANGE UP (ref 1–3.3)
MANUAL SMEAR VERIFICATION: SIGNIFICANT CHANGE UP
MCHC RBC-ENTMCNC: 26.5 PG — LOW (ref 27–34)
MCHC RBC-ENTMCNC: 31.7 GM/DL — LOW (ref 32–36)
MCV RBC AUTO: 83.5 FL — SIGNIFICANT CHANGE UP (ref 80–100)
METAMYELOCYTES # FLD: 0.9 % — SIGNIFICANT CHANGE UP (ref 0–1)
MONOCYTES # BLD AUTO: 0.78 K/UL — SIGNIFICANT CHANGE UP (ref 0–0.9)
MONOCYTES NFR BLD AUTO: 5.2 % — SIGNIFICANT CHANGE UP (ref 2–14)
MYELOCYTES NFR BLD: 1.7 % — HIGH (ref 0–0)
NEUTROPHILS # BLD AUTO: 11.02 K/UL — HIGH (ref 1.8–7.4)
NEUTROPHILS NFR BLD AUTO: 73 % — SIGNIFICANT CHANGE UP (ref 43–77)
NEUTS BAND # BLD: 0.9 % — SIGNIFICANT CHANGE UP (ref 0–6)
NRBC # BLD: 3 /100 — HIGH (ref 0–0)
PLAT MORPH BLD: NORMAL — SIGNIFICANT CHANGE UP
PLATELET # BLD AUTO: 262 K/UL — SIGNIFICANT CHANGE UP (ref 150–400)
PLATELET COUNT - ESTIMATE: NORMAL — SIGNIFICANT CHANGE UP
POLYCHROMASIA BLD QL SMEAR: SIGNIFICANT CHANGE UP
RBC # BLD: 2.91 M/UL — LOW (ref 3.8–5.2)
RBC # FLD: 15.9 % — HIGH (ref 10.3–14.5)
RBC BLD AUTO: NORMAL — SIGNIFICANT CHANGE UP
VARIANT LYMPHS # BLD: 0.9 % — SIGNIFICANT CHANGE UP (ref 0–6)
WBC # BLD: 14.91 K/UL — HIGH (ref 3.8–10.5)
WBC # FLD AUTO: 14.91 K/UL — HIGH (ref 3.8–10.5)

## 2022-10-18 RX ADMIN — Medication 325 MILLIGRAM(S): at 06:06

## 2022-10-18 RX ADMIN — Medication 600 MILLIGRAM(S): at 06:37

## 2022-10-18 RX ADMIN — Medication 500 MILLIGRAM(S): at 11:48

## 2022-10-18 RX ADMIN — Medication 600 MILLIGRAM(S): at 01:18

## 2022-10-18 RX ADMIN — Medication 600 MILLIGRAM(S): at 06:06

## 2022-10-18 RX ADMIN — Medication 600 MILLIGRAM(S): at 11:48

## 2022-10-18 RX ADMIN — Medication 600 MILLIGRAM(S): at 00:24

## 2022-10-18 RX ADMIN — HEPARIN SODIUM 5000 UNIT(S): 5000 INJECTION INTRAVENOUS; SUBCUTANEOUS at 06:06

## 2022-10-18 NOTE — PROGRESS NOTE ADULT - SUBJECTIVE AND OBJECTIVE BOX
OB Postpartum Note:  Delivery    S: 26yo now POD #3 s/p pLTCS. Her pain is well controlled. She is tolerating a regular diet but has not yet passed flatus. Voiding spontaneously and ambulating without difficulty. Denies N/V. Denies CP/SOB/lightheadedness/dizziness.     O:   Vitals:  Vital Signs Last 24 Hrs  T(C): 36.6 (18 Oct 2022 01:32), Max: 36.8 (17 Oct 2022 14:00)  T(F): 97.9 (18 Oct 2022 01:32), Max: 98.3 (17 Oct 2022 14:00)  HR: 82 (18 Oct 2022 01:32) (82 - 89)  BP: 103/59 (18 Oct 2022 01:32) (103/59 - 135/81)  BP(mean): --  RR: 18 (18 Oct 2022 01:32) (18 - 18)  SpO2: 100% (18 Oct 2022 01:32) (100% - 100%)    Parameters below as of 17 Oct 2022 06:23  Patient On (Oxygen Delivery Method): room air        MEDICATIONS  (STANDING):  ascorbic acid 500 milliGRAM(s) Oral daily  diphtheria/tetanus/pertussis (acellular) Vaccine (ADAcel) 0.5 milliLiter(s) IntraMuscular once  ferrous    sulfate 325 milliGRAM(s) Oral three times a day  heparin   Injectable 5000 Unit(s) SubCutaneous every 12 hours  ibuprofen  Tablet. 600 milliGRAM(s) Oral every 6 hours  influenza   Vaccine 0.5 milliLiter(s) IntraMuscular once    MEDICATIONS  (PRN):  dexAMETHasone  Injectable 4 milliGRAM(s) IV Push every 6 hours PRN Nausea  diphenhydrAMINE 25 milliGRAM(s) Oral every 6 hours PRN Pruritus  lanolin Ointment 1 Application(s) Topical every 6 hours PRN Sore Nipples  magnesium hydroxide Suspension 30 milliLiter(s) Oral two times a day PRN Constipation  nalbuphine Injectable 2.5 milliGRAM(s) IV Push every 6 hours PRN Pruritus  naloxone Injectable 0.1 milliGRAM(s) IV Push every 3 minutes PRN For ANY of the following changes in patient status:  A. Breaths Per Minute LESS THAN 10, B. Oxygen saturation LESS THAN 90%, C. Sedation score of 6 for Stop After: 4 Times  ondansetron Injectable 4 milliGRAM(s) IV Push every 6 hours PRN Nausea  oxyCODONE    IR 5 milliGRAM(s) Oral once PRN Moderate to Severe Pain (4-10)  oxyCODONE    IR 5 milliGRAM(s) Oral every 3 hours PRN Moderate to Severe Pain (4-10)  simethicone 80 milliGRAM(s) Chew every 4 hours PRN Gas      Labs:  Blood type: O Positive  Rubella IgG: RPR: Negative                          7.1<L>   17.92<H> >-----------< 223    ( 10-16 @ 12:35 )             22.4<L>                        7.2<L>   17.85<H> >-----------< 218    ( 10-16 @ 06:20 )             22.4<L>                  PE:  General: NAD, patient resting comfortably in bed  Abdomen: Mildly distended, appropriately tender. Fundus firm.  Incision: Clean, dry, intact.  : appropriate amount of lochia on pad  Extremities: SCDs in place, no erythema   OB Postpartum Note:  Delivery    S: 28yo now POD #3 s/p pLTCS. Her pain is well controlled. She is tolerating a regular diet but has not yet passed flatus. Voiding spontaneously and ambulating without difficulty. Denies N/V. Denies CP/SOB/lightheadedness/dizziness.     O:   Vitals:  Vital Signs Last 24 Hrs  T(C): 36.6 (18 Oct 2022 01:32), Max: 36.8 (17 Oct 2022 14:00)  T(F): 97.9 (18 Oct 2022 01:32), Max: 98.3 (17 Oct 2022 14:00)  HR: 82 (18 Oct 2022 01:32) (82 - 89)  BP: 103/59 (18 Oct 2022 01:32) (103/59 - 135/81)  BP(mean): --  RR: 18 (18 Oct 2022 01:32) (18 - 18)  SpO2: 100% (18 Oct 2022 01:32) (100% - 100%)    Parameters below as of 17 Oct 2022 06:23  Patient On (Oxygen Delivery Method): room air        MEDICATIONS  (STANDING):  ascorbic acid 500 milliGRAM(s) Oral daily  diphtheria/tetanus/pertussis (acellular) Vaccine (ADAcel) 0.5 milliLiter(s) IntraMuscular once  ferrous    sulfate 325 milliGRAM(s) Oral three times a day  heparin   Injectable 5000 Unit(s) SubCutaneous every 12 hours  ibuprofen  Tablet. 600 milliGRAM(s) Oral every 6 hours  influenza   Vaccine 0.5 milliLiter(s) IntraMuscular once    MEDICATIONS  (PRN):  dexAMETHasone  Injectable 4 milliGRAM(s) IV Push every 6 hours PRN Nausea  diphenhydrAMINE 25 milliGRAM(s) Oral every 6 hours PRN Pruritus  lanolin Ointment 1 Application(s) Topical every 6 hours PRN Sore Nipples  magnesium hydroxide Suspension 30 milliLiter(s) Oral two times a day PRN Constipation  nalbuphine Injectable 2.5 milliGRAM(s) IV Push every 6 hours PRN Pruritus  naloxone Injectable 0.1 milliGRAM(s) IV Push every 3 minutes PRN For ANY of the following changes in patient status:  A. Breaths Per Minute LESS THAN 10, B. Oxygen saturation LESS THAN 90%, C. Sedation score of 6 for Stop After: 4 Times  ondansetron Injectable 4 milliGRAM(s) IV Push every 6 hours PRN Nausea  oxyCODONE    IR 5 milliGRAM(s) Oral once PRN Moderate to Severe Pain (4-10)  oxyCODONE    IR 5 milliGRAM(s) Oral every 3 hours PRN Moderate to Severe Pain (4-10)  simethicone 80 milliGRAM(s) Chew every 4 hours PRN Gas      Labs:  Blood type: O Positive  Rubella IgG: RPR: Negative                          7.1<L>   17.92<H> >-----------< 223    ( 10-16 @ 12:35 )             22.4<L>                        7.2<L>   17.85<H> >-----------< 218    ( 10-16 @ 06:20 )             22.4<L>                  PE:  General: NAD, patient resting comfortably in bed  Abdomen: Mildly distended, appropriately tender. Fundus firm.  Incision: Clean, dry, intact.  : appropriate amount of lochia on pad  Extremities: SCDs in place, no erythema. +1 bilateral lower extremity swelling without pitting edema.

## 2022-10-18 NOTE — PROGRESS NOTE ADULT - ASSESSMENT
A/P: 26yo POD#3 s/p pLTCS for Cat II tracing, . Pt was transferred from [facility] at 36+ weeks gestation on 10/14/22 with known mosaic trisomy 13, IUGR, iADEV/REDV and fetal tachycardia and was admitted and delivered by Dr Alatorre. Infant in NICU for known cardiac defects. Patient is stable and doing well post-operatively.     #Post partum care   - Continue regular diet.  - Increase ambulation.  - Continue Motrin, Tylenol, oxycodone PRN for pain control  - Post placental Mirena placed intraoperatively, Lot# [id number]    Rosita Mehta MD PGY1 A/P: 26yo POD#3 s/p pLTCS for Cat II tracing, . Pt was transferred from [facility] at 36+ weeks gestation on 10/14/22 with known mosaic trisomy 13, IUGR, iADEV/REDV and fetal tachycardia and was admitted and delivered by Dr Alatorre. Infant in NICU for known cardiac defects. Patient is stable and doing well post-operatively.     #Post partum care   - Continue regular diet.  - Increase ambulation.  - Continue Motrin, Tylenol, oxycodone PRN for pain control  - Post placental Mirena placed intraoperatively, Lot# [id number]  - BLE swelling improved from yesterday  - plan for discharge today    Rosita Mehta MD PGY1

## 2022-10-19 ENCOUNTER — APPOINTMENT (OUTPATIENT)
Dept: ANTEPARTUM | Facility: CLINIC | Age: 27
End: 2022-10-19

## 2022-10-21 ENCOUNTER — APPOINTMENT (OUTPATIENT)
Dept: ANTEPARTUM | Facility: CLINIC | Age: 27
End: 2022-10-21

## 2022-10-24 ENCOUNTER — APPOINTMENT (OUTPATIENT)
Dept: MATERNAL FETAL MEDICINE | Facility: HOSPITAL | Age: 27
End: 2022-10-24

## 2022-10-25 ENCOUNTER — RESULT REVIEW (OUTPATIENT)
Age: 27
End: 2022-10-25

## 2022-10-25 ENCOUNTER — OUTPATIENT (OUTPATIENT)
Dept: OUTPATIENT SERVICES | Facility: HOSPITAL | Age: 27
LOS: 1 days | End: 2022-10-25

## 2022-10-25 ENCOUNTER — APPOINTMENT (OUTPATIENT)
Dept: OBGYN | Facility: HOSPITAL | Age: 27
End: 2022-10-25

## 2022-10-25 VITALS
HEIGHT: 58 IN | SYSTOLIC BLOOD PRESSURE: 124 MMHG | WEIGHT: 125 LBS | BODY MASS INDEX: 26.24 KG/M2 | TEMPERATURE: 98.1 F | HEART RATE: 91 BPM | DIASTOLIC BLOOD PRESSURE: 71 MMHG

## 2022-10-25 DIAGNOSIS — Z98.890 OTHER SPECIFIED POSTPROCEDURAL STATES: Chronic | ICD-10-CM

## 2022-10-25 LAB
BASOPHILS # BLD AUTO: 0.1 K/UL — SIGNIFICANT CHANGE UP (ref 0–0.2)
BASOPHILS NFR BLD AUTO: 0.9 % — SIGNIFICANT CHANGE UP (ref 0–2)
EOSINOPHIL # BLD AUTO: 0.33 K/UL — SIGNIFICANT CHANGE UP (ref 0–0.5)
EOSINOPHIL NFR BLD AUTO: 2.9 % — SIGNIFICANT CHANGE UP (ref 0–6)
HCT VFR BLD CALC: 29.8 % — LOW (ref 34.5–45)
HGB BLD-MCNC: 9.4 G/DL — LOW (ref 11.5–15.5)
IANC: 7.84 K/UL — HIGH (ref 1.8–7.4)
IMM GRANULOCYTES NFR BLD AUTO: 1.4 % — HIGH (ref 0–0.9)
LYMPHOCYTES # BLD AUTO: 2.62 K/UL — SIGNIFICANT CHANGE UP (ref 1–3.3)
LYMPHOCYTES # BLD AUTO: 22.7 % — SIGNIFICANT CHANGE UP (ref 13–44)
MCHC RBC-ENTMCNC: 25.5 PG — LOW (ref 27–34)
MCHC RBC-ENTMCNC: 31.5 GM/DL — LOW (ref 32–36)
MCV RBC AUTO: 81 FL — SIGNIFICANT CHANGE UP (ref 80–100)
MONOCYTES # BLD AUTO: 0.51 K/UL — SIGNIFICANT CHANGE UP (ref 0–0.9)
MONOCYTES NFR BLD AUTO: 4.4 % — SIGNIFICANT CHANGE UP (ref 2–14)
NEUTROPHILS # BLD AUTO: 7.84 K/UL — HIGH (ref 1.8–7.4)
NEUTROPHILS NFR BLD AUTO: 67.7 % — SIGNIFICANT CHANGE UP (ref 43–77)
NRBC # BLD: 0 /100 WBCS — SIGNIFICANT CHANGE UP (ref 0–0)
NRBC # FLD: 0 K/UL — SIGNIFICANT CHANGE UP (ref 0–0)
PLATELET # BLD AUTO: 413 K/UL — HIGH (ref 150–400)
RBC # BLD: 3.68 M/UL — LOW (ref 3.8–5.2)
RBC # FLD: 15.2 % — HIGH (ref 10.3–14.5)
WBC # BLD: 11.56 K/UL — HIGH (ref 3.8–10.5)
WBC # FLD AUTO: 11.56 K/UL — HIGH (ref 3.8–10.5)

## 2022-10-25 NOTE — HISTORY OF PRESENT ILLNESS
[Postpartum Follow Up] : postpartum follow up [Complications:___] : complications include: [unfilled] [Last Pap Date: ___] : Last Pap Date: [unfilled] [Delivery Date: ___] : on [unfilled] [Primary C/S] : delivered by  section [Male] : Delivery History: baby boy [Wt. ___] : weighing [unfilled] [NICU: ___] : NICU: [unfilled] [Breastfeeding] : currently nursing [Discharge HCT: ___] : hematocrit level was [unfilled] [Discharge HGB: ___] : hemoglobin level was [unfilled] [Abdominal Pain] : abdominal pain [Incisional Pain] : incisional pain [None] : No associated symptoms are reported [Doing Well] : is doing well [Fair Pain Control] : has fair pain control [BF with Difficulty] : nursing without difficulty [Resumed Menses] : has not resumed her menses [Resumed [place]] : has not resumed intercourse [Intended Contraception] : the patient does not intended to use contraception postpartum [Back Pain] : no back pain [Shortness of Breath] : no shortness of breath [Suicidal Ideation] : no suicidal ideation [Vaginal Discharge] : no vaginal discharge [FreeTextEntry8] : wound check [de-identified] : Wound is healing without incident. Patient instructed to take analgesia as prescribed. Pt is currently in pain, stating she does not like to take medicine. Pt encouraged to take Motrin as prescribed with food as not to upset her stomach.  [de-identified] : CBC to be drawn. Continue PNV, Fe, and Motrin as prescribed. Patient to return in 4 weeks for complete postpartum visit.  [FreeTextEntry1] : 11 day wound check s/p primary . Pt is happy with "Arash", denies baby blues and postpartum depression. Adequate support with partner. She is able to eat and sleep. Patient is breast and bottle feeding. Mirena was placed during .

## 2022-10-26 ENCOUNTER — APPOINTMENT (OUTPATIENT)
Dept: ANTEPARTUM | Facility: CLINIC | Age: 27
End: 2022-10-26

## 2022-10-28 ENCOUNTER — APPOINTMENT (OUTPATIENT)
Dept: ANTEPARTUM | Facility: CLINIC | Age: 27
End: 2022-10-28

## 2022-10-31 ENCOUNTER — APPOINTMENT (OUTPATIENT)
Dept: MATERNAL FETAL MEDICINE | Facility: HOSPITAL | Age: 27
End: 2022-10-31

## 2022-11-14 ENCOUNTER — APPOINTMENT (OUTPATIENT)
Dept: MATERNAL FETAL MEDICINE | Facility: HOSPITAL | Age: 27
End: 2022-11-14

## 2022-11-21 ENCOUNTER — APPOINTMENT (OUTPATIENT)
Dept: MATERNAL FETAL MEDICINE | Facility: HOSPITAL | Age: 27
End: 2022-11-21

## 2022-11-28 ENCOUNTER — APPOINTMENT (OUTPATIENT)
Dept: MATERNAL FETAL MEDICINE | Facility: HOSPITAL | Age: 27
End: 2022-11-28

## 2022-12-05 ENCOUNTER — APPOINTMENT (OUTPATIENT)
Dept: MATERNAL FETAL MEDICINE | Facility: HOSPITAL | Age: 27
End: 2022-12-05

## 2022-12-05 NOTE — ED ADULT TRIAGE NOTE - CCCP TRG CHIEF CMPLNT
Knee Pain or Injury: Care Instructions  Your Care Instructions     Injuries are a common cause of knee problems. Sudden (acute) injuries may be caused by a direct blow to the knee. They can also be caused by abnormal twisting, bending, or falling on the knee. Pain, bruising, or swelling may be severe, and may start within minutes of the injury. Overuse is another cause of knee pain. Other causes are climbing stairs, kneeling, and other activities that use the knee. Everyday wear and tear, especially as you get older, also can cause knee pain. Rest, along with home treatment, often relieves pain and allows your knee to heal. If you have a serious knee injury, you may need tests and treatment. Follow-up care is a key part of your treatment and safety. Be sure to make and go to all appointments, and call your doctor if you are having problems. It's also a good idea to know your test results and keep a list of the medicines you take. How can you care for yourself at home? · Be safe with medicines. Read and follow all instructions on the label. ? If the doctor gave you a prescription medicine for pain, take it as prescribed. ? If you are not taking a prescription pain medicine, ask your doctor if you can take an over-the-counter medicine. · Rest and protect your knee. Take a break from any activity that may cause pain. · Put ice or a cold pack on your knee for 10 to 20 minutes at a time. Put a thin cloth between the ice and your skin. · Prop up a sore knee on a pillow when you ice it or anytime you sit or lie down for the next 3 days. Try to keep it above the level of your heart. This will help reduce swelling. · If your knee is not swollen, you can put moist heat, a heating pad, or a warm cloth on your knee. · If your doctor recommends an elastic bandage, sleeve, or other type of support for your knee, wear it as directed.   · Follow your doctor's instructions about how much weight you can put on your leg. Use a cane, crutches, or a walker as instructed. · Follow your doctor's instructions about activity during your healing process. If you can do mild exercise, slowly increase your activity. · Reach and stay at a healthy weight. Extra weight can strain the joints, especially the knees and hips, and make the pain worse. Losing even a few pounds may help. When should you call for help? Call 911 anytime you think you may need emergency care. For example, call if:    · You have symptoms of a blood clot in your lung (called a pulmonary embolism). These may include:  ? Sudden chest pain. ? Trouble breathing. ? Coughing up blood. Call your doctor now or seek immediate medical care if:    · You have severe or increasing pain.     · Your leg or foot turns cold or changes color.     · You cannot stand or put weight on your knee.     · Your knee looks twisted or bent out of shape.     · You cannot move your knee.     · You have signs of infection, such as:  ? Increased pain, swelling, warmth, or redness. ? Red streaks leading from the knee. ? Pus draining from a place on your knee. ? A fever.     · You have signs of a blood clot in your leg (called a deep vein thrombosis), such as:  ? Pain in your calf, back of the knee, thigh, or groin. ? Redness and swelling in your leg or groin. Watch closely for changes in your health, and be sure to contact your doctor if:    · You have tingling, weakness, or numbness in your knee.     · You have any new symptoms, such as swelling.     · You have bruises from a knee injury that last longer than 2 weeks.     · You do not get better as expected. Where can you learn more? Go to http://www.gray.com/  Enter K195 in the search box to learn more about \"Knee Pain or Injury: Care Instructions. \"  Current as of: July 1, 2021               Content Version: 13.2  © 0152-0429 [employer], [employer].    Care instructions adapted under license by [employer] (which disclaims liability or warranty for this information). If you have questions about a medical condition or this instruction, always ask your healthcare professional. [address] any warranty or liability for your use of this information. nausea, vomiting, diarrhea Altered mentation

## 2022-12-12 ENCOUNTER — APPOINTMENT (OUTPATIENT)
Dept: MATERNAL FETAL MEDICINE | Facility: HOSPITAL | Age: 27
End: 2022-12-12

## 2022-12-16 ENCOUNTER — APPOINTMENT (OUTPATIENT)
Dept: OBGYN | Facility: CLINIC | Age: 27
End: 2022-12-16

## 2022-12-16 ENCOUNTER — OUTPATIENT (OUTPATIENT)
Dept: OUTPATIENT SERVICES | Facility: HOSPITAL | Age: 27
LOS: 1 days | End: 2022-12-16
Payer: MEDICAID

## 2022-12-16 VITALS
SYSTOLIC BLOOD PRESSURE: 107 MMHG | BODY MASS INDEX: 25.4 KG/M2 | HEIGHT: 58 IN | HEART RATE: 69 BPM | WEIGHT: 121 LBS | DIASTOLIC BLOOD PRESSURE: 69 MMHG | OXYGEN SATURATION: 100 % | TEMPERATURE: 98.3 F

## 2022-12-16 DIAGNOSIS — Z98.890 OTHER SPECIFIED POSTPROCEDURAL STATES: Chronic | ICD-10-CM

## 2022-12-16 DIAGNOSIS — Z34.00 ENCOUNTER FOR SUPERVISION OF NORMAL FIRST PREGNANCY, UNSPECIFIED TRIMESTER: ICD-10-CM

## 2022-12-16 PROCEDURE — 99213 OFFICE O/P EST LOW 20 MIN: CPT

## 2022-12-16 PROCEDURE — G0463: CPT

## 2022-12-16 NOTE — HISTORY OF PRESENT ILLNESS
[Postpartum Follow Up] : postpartum follow up [Last Pap Date: ___] : Last Pap Date: [unfilled] [Delivery Date: ___] : on [unfilled] [Primary C/S] : delivered by  section [Male] : Delivery History: baby boy [Wt. ___] : weighing [unfilled] [Breastfeeding] : currently nursing [BF with Difficulty] : nursing with difficulty [Discharge HCT: ___] : hematocrit level was [unfilled] [Discharge HGB: ___] : hemoglobin level was [unfilled] [Resumed Menses] : has resumed her menses [Resumed [place]] : has resumed intercourse [Intended Contraception] : Intended Contraception: [Back to Normal] : is back to normal in size [Cervix Sample Taken] : cervical sample not taken for a Pap smear [Examination Of The Breasts] : breasts are normal [Doing Well] : is doing well [No Sign of Infection] : is showing no signs of infection [None] : None [FreeTextEntry8] : c/o light vag bleeding stopped 2 days ago, now spotting. Has Mirena IUD put in at time of c/s [FreeTextEntry9] : Baby w/ mosaic trisommy 13- phenotypically normal and is doing well. PT delivered at 34+wks [de-identified] : IUD string visible at cx os

## 2022-12-19 ENCOUNTER — APPOINTMENT (OUTPATIENT)
Dept: MATERNAL FETAL MEDICINE | Facility: HOSPITAL | Age: 27
End: 2022-12-19

## 2022-12-19 DIAGNOSIS — Z30.431 ENCOUNTER FOR ROUTINE CHECKING OF INTRAUTERINE CONTRACEPTIVE DEVICE: ICD-10-CM

## 2023-02-22 ENCOUNTER — APPOINTMENT (OUTPATIENT)
Dept: DERMATOLOGY | Facility: CLINIC | Age: 28
End: 2023-02-22
Payer: MEDICAID

## 2023-02-22 PROCEDURE — 99204 OFFICE O/P NEW MOD 45 MIN: CPT

## 2023-02-22 NOTE — ASSESSMENT
[FreeTextEntry1] : rash; LSC\par -education\par -gentle skin care reviewed\par betamethasone ointment BID PRN; SED\par \par acne\par tretinoin 0.025% cream at bedtime; SED\par \par f/u 3 months; consider yeyo if no improvement

## 2023-02-22 NOTE — PHYSICAL EXAM
[FreeTextEntry3] : AAOx3, pleasant, NAD, no visual lymphadenopathy\par hair, scalp, face, nose, eyelids, ears, lips, oropharynx, neck, chest, abdomen, back, right arm, left arm, nails, and hands examined with all normal findings,\par pertinent findings include:\par \par xerosis and lichenification on b/l hands\par multiple papules, pustules and open comedones on face\par

## 2023-02-22 NOTE — HISTORY OF PRESENT ILLNESS
[FreeTextEntry1] : rash, acne [de-identified] : 27 year old female here with rash and acne. rash on hands led to discoloration. itchy.

## 2023-02-23 ENCOUNTER — NON-APPOINTMENT (OUTPATIENT)
Age: 28
End: 2023-02-23

## 2023-03-03 ENCOUNTER — OUTPATIENT (OUTPATIENT)
Dept: OUTPATIENT SERVICES | Facility: HOSPITAL | Age: 28
LOS: 1 days | End: 2023-03-03
Payer: MEDICAID

## 2023-03-03 ENCOUNTER — APPOINTMENT (OUTPATIENT)
Dept: OBGYN | Facility: CLINIC | Age: 28
End: 2023-03-03
Payer: MEDICAID

## 2023-03-03 VITALS
HEART RATE: 73 BPM | SYSTOLIC BLOOD PRESSURE: 122 MMHG | WEIGHT: 126 LBS | OXYGEN SATURATION: 100 % | DIASTOLIC BLOOD PRESSURE: 81 MMHG | BODY MASS INDEX: 26.45 KG/M2 | HEIGHT: 58 IN | TEMPERATURE: 97.3 F

## 2023-03-03 DIAGNOSIS — Z98.890 OTHER SPECIFIED POSTPROCEDURAL STATES: Chronic | ICD-10-CM

## 2023-03-03 DIAGNOSIS — Z00.00 ENCOUNTER FOR GENERAL ADULT MEDICAL EXAMINATION WITHOUT ABNORMAL FINDINGS: ICD-10-CM

## 2023-03-03 PROCEDURE — G0463: CPT

## 2023-03-03 PROCEDURE — 99213 OFFICE O/P EST LOW 20 MIN: CPT

## 2023-03-03 NOTE — HISTORY OF PRESENT ILLNESS
[Patient reported PAP Smear was normal] : Patient reported PAP Smear was normal [FreeTextEntry1] : CC: c/o intermitten vag bleeding since Mirena IUD was placed at time of c/s, c/o string bothers her.\par \par UPT neg\par  [PapSmeardate] : 5/19/2022 [TextBox_31] : WNL

## 2023-03-03 NOTE — PHYSICAL EXAM
[Discharge] : a  ~M vaginal discharge was present [Normal] : normal [IUD String] : an IUD string was noted

## 2023-03-06 ENCOUNTER — NON-APPOINTMENT (OUTPATIENT)
Age: 28
End: 2023-03-06

## 2023-03-06 DIAGNOSIS — Z30.431 ENCOUNTER FOR ROUTINE CHECKING OF INTRAUTERINE CONTRACEPTIVE DEVICE: ICD-10-CM

## 2023-03-09 ENCOUNTER — OUTPATIENT (OUTPATIENT)
Dept: OUTPATIENT SERVICES | Facility: HOSPITAL | Age: 28
LOS: 1 days | End: 2023-03-09
Payer: MEDICAID

## 2023-03-09 ENCOUNTER — APPOINTMENT (OUTPATIENT)
Dept: OBGYN | Facility: CLINIC | Age: 28
End: 2023-03-09
Payer: MEDICAID

## 2023-03-09 VITALS
TEMPERATURE: 97 F | SYSTOLIC BLOOD PRESSURE: 119 MMHG | WEIGHT: 126 LBS | DIASTOLIC BLOOD PRESSURE: 83 MMHG | HEIGHT: 58 IN | RESPIRATION RATE: 18 BRPM | HEART RATE: 73 BPM | BODY MASS INDEX: 26.45 KG/M2 | OXYGEN SATURATION: 100 %

## 2023-03-09 DIAGNOSIS — Z00.00 ENCOUNTER FOR GENERAL ADULT MEDICAL EXAMINATION WITHOUT ABNORMAL FINDINGS: ICD-10-CM

## 2023-03-09 DIAGNOSIS — Z98.890 OTHER SPECIFIED POSTPROCEDURAL STATES: Chronic | ICD-10-CM

## 2023-03-09 PROCEDURE — 58300 INSERT INTRAUTERINE DEVICE: CPT

## 2023-03-09 PROCEDURE — 81025 URINE PREGNANCY TEST: CPT

## 2023-03-09 PROCEDURE — 99213 OFFICE O/P EST LOW 20 MIN: CPT | Mod: 25

## 2023-03-09 PROCEDURE — G0463: CPT

## 2023-03-09 NOTE — HISTORY OF PRESENT ILLNESS
[Patient reported PAP Smear was normal] : Patient reported PAP Smear was normal [LMP unknown] : LMP unknown [Y] : Positive pregnancy history [FreeTextEntry1] : Patient is here for contraception, reports IUD fell out on 3/6/23. PT c/o bleeding and wt gain on Mirena iud. \par Other methods of bc dw/ pt. PT reports h/o reg menses.\par PT requests Paragard IUD.\par  [PapSmeardate] : 5/19/22 [PGHxTotal] : 2 [PGHxFullTerm] : 0 [PGxPremature] : 1 [PGHxAbortions] : 1 [A[facility] Scottsd[facility] Campusiving] : 1 [PGHxABInduced] : 1 [PGHxABSpont] : 0 [PGHxEctopic] : 0 [PGHxMultBirths] : 0

## 2023-03-09 NOTE — PROCEDURE
[IUD Placement] : intrauterine device (IUD) placement [Consent Obtained] : Consent obtained [Prevention of Pregnancy] : prevention of pregnancy [Risks] : risks [Patient] : patient [Neg Pregnancy Test] : negative pregnancy test [No Premedication] : No premedication [Tenaculum] : Tenaculum [Easy Passage] : Easy passage [Sounded to ___ cm] : sounded to [unfilled] ~Ucm [ParaGard IUD] : ParaGard IUD [No Complications] : No complications [None] : None

## 2023-03-10 ENCOUNTER — EMERGENCY (EMERGENCY)
Facility: HOSPITAL | Age: 28
LOS: 1 days | Discharge: HOME | End: 2023-03-10
Attending: EMERGENCY MEDICINE
Payer: MEDICAID

## 2023-03-10 VITALS
RESPIRATION RATE: 18 BRPM | TEMPERATURE: 98 F | SYSTOLIC BLOOD PRESSURE: 112 MMHG | HEART RATE: 62 BPM | OXYGEN SATURATION: 100 % | DIASTOLIC BLOOD PRESSURE: 75 MMHG

## 2023-03-10 VITALS
OXYGEN SATURATION: 99 % | DIASTOLIC BLOOD PRESSURE: 73 MMHG | TEMPERATURE: 98 F | HEART RATE: 78 BPM | WEIGHT: 123.9 LBS | RESPIRATION RATE: 19 BRPM | SYSTOLIC BLOOD PRESSURE: 116 MMHG | HEIGHT: 57 IN

## 2023-03-10 DIAGNOSIS — Z30.431 ENCOUNTER FOR ROUTINE CHECKING OF INTRAUTERINE CONTRACEPTIVE DEVICE: ICD-10-CM

## 2023-03-10 DIAGNOSIS — Z98.890 OTHER SPECIFIED POSTPROCEDURAL STATES: Chronic | ICD-10-CM

## 2023-03-10 LAB
ALBUMIN SERPL ELPH-MCNC: 3.7 G/DL — SIGNIFICANT CHANGE UP (ref 3.5–5)
ALP SERPL-CCNC: 114 U/L — SIGNIFICANT CHANGE UP (ref 40–120)
ALT FLD-CCNC: 35 U/L DA — SIGNIFICANT CHANGE UP (ref 10–60)
ANION GAP SERPL CALC-SCNC: 7 MMOL/L — SIGNIFICANT CHANGE UP (ref 5–17)
ANISOCYTOSIS BLD QL: SLIGHT — SIGNIFICANT CHANGE UP
APPEARANCE UR: CLEAR — SIGNIFICANT CHANGE UP
AST SERPL-CCNC: 21 U/L — SIGNIFICANT CHANGE UP (ref 10–40)
BACTERIA # UR AUTO: ABNORMAL /HPF
BASOPHILS # BLD AUTO: 0.04 K/UL — SIGNIFICANT CHANGE UP (ref 0–0.2)
BASOPHILS NFR BLD AUTO: 0.3 % — SIGNIFICANT CHANGE UP (ref 0–2)
BILIRUB SERPL-MCNC: 0.3 MG/DL — SIGNIFICANT CHANGE UP (ref 0.2–1.2)
BILIRUB UR-MCNC: NEGATIVE — SIGNIFICANT CHANGE UP
BUN SERPL-MCNC: 15 MG/DL — SIGNIFICANT CHANGE UP (ref 7–18)
CALCIUM SERPL-MCNC: 9.2 MG/DL — SIGNIFICANT CHANGE UP (ref 8.4–10.5)
CHLORIDE SERPL-SCNC: 103 MMOL/L — SIGNIFICANT CHANGE UP (ref 96–108)
CO2 SERPL-SCNC: 25 MMOL/L — SIGNIFICANT CHANGE UP (ref 22–31)
COLOR SPEC: YELLOW — SIGNIFICANT CHANGE UP
CREAT SERPL-MCNC: 0.66 MG/DL — SIGNIFICANT CHANGE UP (ref 0.5–1.3)
DIFF PNL FLD: ABNORMAL
EGFR: 122 ML/MIN/1.73M2 — SIGNIFICANT CHANGE UP
EOSINOPHIL # BLD AUTO: 0.06 K/UL — SIGNIFICANT CHANGE UP (ref 0–0.5)
EOSINOPHIL NFR BLD AUTO: 0.4 % — SIGNIFICANT CHANGE UP (ref 0–6)
EPI CELLS # UR: SIGNIFICANT CHANGE UP /HPF
GLUCOSE SERPL-MCNC: 100 MG/DL — HIGH (ref 70–99)
GLUCOSE UR QL: NEGATIVE — SIGNIFICANT CHANGE UP
HCG UR QL: NEGATIVE — SIGNIFICANT CHANGE UP
HCT VFR BLD CALC: 35.7 % — SIGNIFICANT CHANGE UP (ref 34.5–45)
HGB BLD-MCNC: 11.3 G/DL — LOW (ref 11.5–15.5)
IMM GRANULOCYTES NFR BLD AUTO: 0.4 % — SIGNIFICANT CHANGE UP (ref 0–0.9)
KETONES UR-MCNC: NEGATIVE — SIGNIFICANT CHANGE UP
LEUKOCYTE ESTERASE UR-ACNC: ABNORMAL
LIDOCAIN IGE QN: 83 U/L — SIGNIFICANT CHANGE UP (ref 73–393)
LYMPHOCYTES # BLD AUTO: 14.2 % — SIGNIFICANT CHANGE UP (ref 13–44)
LYMPHOCYTES # BLD AUTO: 2.18 K/UL — SIGNIFICANT CHANGE UP (ref 1–3.3)
MANUAL SMEAR VERIFICATION: SIGNIFICANT CHANGE UP
MCHC RBC-ENTMCNC: 23.5 PG — LOW (ref 27–34)
MCHC RBC-ENTMCNC: 31.7 GM/DL — LOW (ref 32–36)
MCV RBC AUTO: 74.4 FL — LOW (ref 80–100)
MICROCYTES BLD QL: SIGNIFICANT CHANGE UP
MONOCYTES # BLD AUTO: 0.41 K/UL — SIGNIFICANT CHANGE UP (ref 0–0.9)
MONOCYTES NFR BLD AUTO: 2.7 % — SIGNIFICANT CHANGE UP (ref 2–14)
NEUTROPHILS # BLD AUTO: 12.57 K/UL — HIGH (ref 1.8–7.4)
NEUTROPHILS NFR BLD AUTO: 82 % — HIGH (ref 43–77)
NITRITE UR-MCNC: NEGATIVE — SIGNIFICANT CHANGE UP
NRBC # BLD: 0 /100 WBCS — SIGNIFICANT CHANGE UP (ref 0–0)
PH UR: 6 — SIGNIFICANT CHANGE UP (ref 5–8)
PLAT MORPH BLD: NORMAL — SIGNIFICANT CHANGE UP
PLATELET # BLD AUTO: 391 K/UL — SIGNIFICANT CHANGE UP (ref 150–400)
POTASSIUM SERPL-MCNC: 4 MMOL/L — SIGNIFICANT CHANGE UP (ref 3.5–5.3)
POTASSIUM SERPL-SCNC: 4 MMOL/L — SIGNIFICANT CHANGE UP (ref 3.5–5.3)
PROT SERPL-MCNC: 8.5 G/DL — HIGH (ref 6–8.3)
PROT UR-MCNC: NEGATIVE — SIGNIFICANT CHANGE UP
RBC # BLD: 4.8 M/UL — SIGNIFICANT CHANGE UP (ref 3.8–5.2)
RBC # FLD: 17 % — HIGH (ref 10.3–14.5)
RBC BLD AUTO: ABNORMAL
RBC CASTS # UR COMP ASSIST: ABNORMAL /HPF (ref 0–2)
SODIUM SERPL-SCNC: 135 MMOL/L — SIGNIFICANT CHANGE UP (ref 135–145)
SP GR SPEC: 1.02 — SIGNIFICANT CHANGE UP (ref 1.01–1.02)
UROBILINOGEN FLD QL: NEGATIVE — SIGNIFICANT CHANGE UP
WBC # BLD: 15.32 K/UL — HIGH (ref 3.8–10.5)
WBC # FLD AUTO: 15.32 K/UL — HIGH (ref 3.8–10.5)
WBC UR QL: SIGNIFICANT CHANGE UP /HPF (ref 0–5)

## 2023-03-10 PROCEDURE — 96374 THER/PROPH/DIAG INJ IV PUSH: CPT

## 2023-03-10 PROCEDURE — 36415 COLL VENOUS BLD VENIPUNCTURE: CPT

## 2023-03-10 PROCEDURE — 96361 HYDRATE IV INFUSION ADD-ON: CPT

## 2023-03-10 PROCEDURE — 85025 COMPLETE CBC W/AUTO DIFF WBC: CPT

## 2023-03-10 PROCEDURE — 87086 URINE CULTURE/COLONY COUNT: CPT

## 2023-03-10 PROCEDURE — 99284 EMERGENCY DEPT VISIT MOD MDM: CPT

## 2023-03-10 PROCEDURE — 83690 ASSAY OF LIPASE: CPT

## 2023-03-10 PROCEDURE — 81025 URINE PREGNANCY TEST: CPT

## 2023-03-10 PROCEDURE — 80053 COMPREHEN METABOLIC PANEL: CPT

## 2023-03-10 PROCEDURE — 99284 EMERGENCY DEPT VISIT MOD MDM: CPT | Mod: 25

## 2023-03-10 PROCEDURE — 96375 TX/PRO/DX INJ NEW DRUG ADDON: CPT

## 2023-03-10 PROCEDURE — 81001 URINALYSIS AUTO W/SCOPE: CPT

## 2023-03-10 RX ORDER — ONDANSETRON 8 MG/1
1 TABLET, FILM COATED ORAL
Qty: 15 | Refills: 0
Start: 2023-03-10 | End: 2023-03-14

## 2023-03-10 RX ORDER — SODIUM CHLORIDE 9 MG/ML
1000 INJECTION INTRAMUSCULAR; INTRAVENOUS; SUBCUTANEOUS ONCE
Refills: 0 | Status: COMPLETED | OUTPATIENT
Start: 2023-03-10 | End: 2023-03-10

## 2023-03-10 RX ORDER — FAMOTIDINE 10 MG/ML
20 INJECTION INTRAVENOUS ONCE
Refills: 0 | Status: COMPLETED | OUTPATIENT
Start: 2023-03-10 | End: 2023-03-10

## 2023-03-10 RX ORDER — ONDANSETRON 8 MG/1
4 TABLET, FILM COATED ORAL ONCE
Refills: 0 | Status: COMPLETED | OUTPATIENT
Start: 2023-03-10 | End: 2023-03-10

## 2023-03-10 RX ORDER — KETOROLAC TROMETHAMINE 30 MG/ML
15 SYRINGE (ML) INJECTION ONCE
Refills: 0 | Status: DISCONTINUED | OUTPATIENT
Start: 2023-03-10 | End: 2023-03-10

## 2023-03-10 RX ADMIN — Medication 15 MILLIGRAM(S): at 20:28

## 2023-03-10 RX ADMIN — ONDANSETRON 4 MILLIGRAM(S): 8 TABLET, FILM COATED ORAL at 19:16

## 2023-03-10 RX ADMIN — FAMOTIDINE 20 MILLIGRAM(S): 10 INJECTION INTRAVENOUS at 19:17

## 2023-03-10 RX ADMIN — Medication 15 MILLIGRAM(S): at 19:58

## 2023-03-10 RX ADMIN — SODIUM CHLORIDE 1000 MILLILITER(S): 9 INJECTION INTRAMUSCULAR; INTRAVENOUS; SUBCUTANEOUS at 19:21

## 2023-03-10 RX ADMIN — SODIUM CHLORIDE 1000 MILLILITER(S): 9 INJECTION INTRAMUSCULAR; INTRAVENOUS; SUBCUTANEOUS at 20:21

## 2023-03-10 NOTE — ED ADULT NURSE NOTE - OBJECTIVE STATEMENT
Pt presents to the ED with c/o nausea and vomiting since 10am today. Pt had IUD placed yesterday. Pt presents to the ED with c/o nausea and vomiting with headache since 10am today. Pt had IUD placed yesterday.

## 2023-03-10 NOTE — ED PROVIDER NOTE - PROGRESS NOTE DETAILS
Patient reports feeling better, repeat exam benign.  Patient nontoxic and medically stable for discharge. Return precautions provided and patient understands to return to the ED for concerning or worsening signs and symptoms. Instructed to follow up with primary care physician and agreeable. Patient's questions answered.  Zofran sent to pharmacy.

## 2023-03-10 NOTE — ED PROVIDER NOTE - OBJECTIVE STATEMENT
27-year-old female history of  presents for nausea vomiting and increased stools today.  Patient states that it started after eating breakfast about 10 AM.  Patient states that she was eating leftovers and is unsure if that is what caused it.  Patient denies any blood in the vomit or stool.  Patient states that she had an IUD placed yesterday but has no pelvic pain vaginal bleeding or any other symptoms.  No fevers or chills.

## 2023-03-10 NOTE — ED PROVIDER NOTE - PATIENT PORTAL LINK FT
You can access the FollowMyHealth Patient Portal offered by [facility] by registering at the following website: [email or web address]. By joining [employer]’s FollowMyHealth portal, you will also be able to view your health information using other applications (apps) compatible with our system.

## 2023-03-10 NOTE — ED PROVIDER NOTE - CLINICAL SUMMARY MEDICAL DECISION MAKING FREE TEXT BOX
27-year-old female history of  presents for nausea vomiting and increased stools today.   Likely gastroenteritis.  well appearing, reassuring exam.  Will provide IVF and meds, check labs and urine, dispo accordingly.

## 2023-03-10 NOTE — ED PROVIDER NOTE - NSDCPRINTRESULTS_ED_ALL_ED
After Visit Summary   7/27/2018    Gerri Owens    MRN: [id number]           Patient Information     Date Of Birth          1957        Visit Information        Provider Department      7/27/2018 9:00 AM  23 ATC;  ONCOLOGY INFUSION [facility]        Today's Diagnoses     Endometrial cancer (H)    -  1    Encounter for long-term (current) use of medications          Care Instructions    Contact Numbers  [facility] Nurse Triage: [phone]  After Hours Nurse Line:  [phone]     Please call the [facility] Triage line if you experience a temperature greater than or equal to 100.5, shaking chills, have uncontrolled nausea, vomiting and/or diarrhea, dizziness, shortness of breath, chest pain, bleeding, unexplained bruising, or if you have any other new/concerning symptoms, questions or concerns.      If it is after hours, weekends, or holidays, please call either the after hours nurse line listed above.      If you are having any concerning symptoms or wish to speak to a provider before your next infusion visit, please call your care coordinator or triage to notify them so we can adequately serve you.      If you need a refill on a narcotic prescription or other medication, please call triage before your infusion appointment.         July 2018 Sunday Monday Tuesday Wednesday Thursday Friday Saturday   1     2     3     4     5     6     [facility]    8:30 AM   (15 min.)   [facility] DRAW   [facility] Lab Draw     [facility] RETURN    8:45 AM   (40 min.)   Irasema Gtz APRN CNP   [facility] ONC INFUSION 120    9:30 AM   (120 min.)    ONCOLOGY INFUSION   [facility]     CT CHEST PULMONARY EMBOLISM W    2:40 PM   (20 min.)   UCCT2   [facility] CT     Admission    5:25 PM   Bradley Tierney MD   [address] [facility]   (Discharge: 7/7/2018) 7     ECH COMPLETE    8:10 AM   (60  min.)   UUECHIPR1   [facility],  [facility]   8     9     10     11     LAB   12:00 PM   (15 min.)   HW LAB   [facility] 12     13     14       15     16     17     18     19     LONG    8:40 AM   (20 min.)   Karo Doty MD   [facility] 20     21       22     23     24     25     26     27     P MASON[facility] LAB DRAW    7:45 AM   (15 min.)   [facility] LAB DRAW   [facility] Lab Draw     P RETURN    8:05 AM   (40 min.)   Irasema Gtz APRN CNP   [facility]    9:00 AM   (120 min.)   UC ONCOLOGY INFUSION   [facility] 28 29 30 31 August 2018 Sunday Monday Tuesday Wednesday Thursday Friday Saturday                  1     2     3     4       5     6     7     8     9     10     11       12     13     14     15     16     17     [facility] MASONIC LAB DRAW    7:45 AM   (15 min.)   [facility] LAB DRAW   [facility] Lab Draw     P RETURN ACTIVE TREATMENT    8:05 AM   (40 min.)   Irasema Gtz APRN CNP   [facility]    9:00 AM   (120 min.)   UC ONCOLOGY INFUSION   [facility] 18       19     20     21     22     23     24     25       26     27     [facility] RETURN   10:30 AM   (30 min.)   Darling Billings MD   Radiation Oncology Clinic     [facility] TCT/SIM SUITE   11:00 AM   (60 min.)   Darling Billings MD   Radiation Oncology Clinic     CT CHEST/ABDOMEN/PELVIS W   11:50 AM   (20 min.)   UCCT1   [facility] CT 28     29     30     31                       Lab Results:  Recent Results (from the past 12 hour(s))   CBC with platelets differential    Collection Time: 07/27/18  8:20 AM   Result Value Ref Range    WBC 3.4 (L) 4.0 - 11.0 10e9/L    RBC Count 3.22 (L) 3.8 - 5.2 10e12/L    Hemoglobin 8.1 (L) 11.7 - 15.7 g/dL    Hematocrit 27.5 (L) 35.0 - 47.0 %    MCV 85 78 - 100 fl    MCH 25.2 (L) 26.5  - 33.0 pg    MCHC 29.5 (L) 31.5 - 36.5 g/dL    RDW 20.4 (H) 10.0 - 15.0 %    Platelet Count 182 150 - 450 10e9/L    Diff Method Automated Method     % Neutrophils 68.5 %    % Lymphocytes 19.1 %    % Monocytes 11.5 %    % Eosinophils 0.6 %    % Basophils 0.3 %    % Immature Granulocytes 0.0 %    Nucleated RBCs 0 0 /100    Absolute Neutrophil 2.3 1.6 - 8.3 10e9/L    Absolute Lymphocytes 0.7 (L) 0.8 - 5.3 10e9/L    Absolute Monocytes 0.4 0.0 - 1.3 10e9/L    Absolute Eosinophils 0.0 0.0 - 0.7 10e9/L    Absolute Basophils 0.0 0.0 - 0.2 10e9/L    Abs Immature Granulocytes 0.0 0 - 0.4 10e9/L    Absolute Nucleated RBC 0.0    Comprehensive metabolic panel    Collection Time: 07/27/18  8:20 AM   Result Value Ref Range    Sodium 141 133 - 144 mmol/L    Potassium 3.7 3.4 - 5.3 mmol/L    Chloride 108 94 - 109 mmol/L    Carbon Dioxide 26 20 - 32 mmol/L    Anion Gap 7 3 - 14 mmol/L    Glucose 92 70 - 99 mg/dL    Urea Nitrogen 12 7 - 30 mg/dL    Creatinine 0.89 0.52 - 1.04 mg/dL    GFR Estimate 64 >60 mL/min/1.7m2    GFR Estimate If Black 78 >60 mL/min/1.7m2    Calcium 8.6 8.5 - 10.1 mg/dL    Bilirubin Total 0.2 0.2 - 1.3 mg/dL    Albumin 2.3 (L) 3.4 - 5.0 g/dL    Protein Total 6.0 (L) 6.8 - 8.8 g/dL    Alkaline Phosphatase 119 40 - 150 U/L    ALT 13 0 - 50 U/L    AST 11 0 - 45 U/L   Magnesium    Collection Time: 07/27/18  8:20 AM   Result Value Ref Range    Magnesium 2.0 1.6 - 2.3 mg/dL               Follow-ups after your visit        Your next 10 appointments already scheduled     Aug 17, 2018  7:45 AM T   [facility] Lab Draw with  MASONIC LAB DRAW   [facility] Masonic Lab Draw ([facility])    [address]  [address] [place], [place] [address]   [phone]            Aug 17, 2018  8:20 AM CDT   (Arrive by 8:05 AM)   Return Active Treatment with ALFONZO eWathers CNP   [facility] ([facility])    [address]  [address] [place] [address]  [address]   [phone]            Aug 17, 2018  9:00 AM CDT   Infusion 120 with UC ONCOLOGY INFUSION, UC 23 ATC   [facility] ([facility])    [address]  [address]  [place] [address]   [phone]            Aug 27, 2018 10:30 AM CDT   Return Visit with Darling Billings MD   Radiation Oncology Clinic ([facility])    [facility]  1st Floor  [address]  [place] [address]   [phone]            Aug 27, 2018 11:00 AM CDT   TCT/SIM Suite Visit with Darling Billings MD   Radiation Oncology Clinic ([facility])    [facility]  1st Floor  [address]  [place] [address]   [phone]            Aug 27, 2018 12:20 PM CDT   (Arrive by 11:50 AM)   CT CHEST/ABDOMEN/PELVIS W CONTRAST with UCCT1   [facility] CT ([facility])    [address]  1st Floor  [place] [address]   [phone]           Please bring any scans or X-rays taken at other hospitals, if similar tests were done. Also bring a list of your medicines, including vitamins, minerals and over-the-counter drugs. It is safest to leave personal items at home.  Be sure to tell your doctor:   If you have any allergies.   If there s any chance you are pregnant.   If you are breastfeeding.  How to prepare:   Do not eat or drink for 2 hours before your exam. If you need to take medicine, you may take it with small sips of water. (We may ask you to take liquid medicine as well.)   Please wear loose clothing, such as a sweat suit or jogging clothes. Avoid snaps, zippers and other metal. We may ask you to undress and put on a hospital gown.  Please arrive 30 minutes early for your CT. Once in the department you might be asked to drink water 15-20 minutes prior to your exam.  If indicated you may be asked to drink an oral contrast in advance of your CT.  If this is the  case, the imaging team will let you know or be in contact with you prior to your appointment  Patients over 70 or patients with diabetes or kidney problems:   If you haven t had a blood test (creatinine test) within the last 30 days, the Cardiologist/Radiologist may require you to get this test prior to your exam.  If you have diabetes:   Continue to take your metformin medication on the day of your exam  If you have any questions, please call the Imaging Department where you will have your exam.              Future tests that were ordered for you today     Open Future Orders        Priority Expected Expires Ordered    Heparin 10a Level Routine  7/27/2019 7/27/2018            Who to contact     If you have questions or need follow up information about today's clinic visit or your schedule please contact [facility] directly at [phone].  Normal or non-critical lab and imaging results will be communicated to you by Amgenhart, letter or phone within 4 business days after the clinic has received the results. If you do not hear from us within 7 days, please contact the clinic through Vigilentt or phone. If you have a critical or abnormal lab result, we will notify you by phone as soon as possible.  Submit refill requests through BookMyShow or call your pharmacy and they will forward the refill request to us. Please allow 3 business days for your refill to be completed.          Additional Information About Your Visit        BookMyShow Information     BookMyShow gives you secure access to your electronic health record. If you see a primary care provider, you can also send messages to your care team and make appointments. If you have questions, please call your primary care clinic.  If you do not have a primary care provider, please call [phone] and they will assist you.        Care EveryWhere ID     This is your Care EveryWhere ID. This could be used by other organizations to access your [facility]  records  [id number]        Your Vitals Were     Last Period                   03/04/2005            Blood Pressure from Last 3 Encounters:   07/27/18 94/54   07/19/18 100/61   07/07/18 94/57    Weight from Last 3 Encounters:   07/27/18 77.1 kg (170 lb)   07/19/18 76.7 kg (169 lb)   07/06/18 78.9 kg (173 lb 14.4 oz)              We Performed the Following     CBC with platelets differential     Comprehensive metabolic panel     Magnesium          Where to get your medicines      These medications were sent to [place], [place] - [address]  [address], [facility] [address]    Hours:  TRANSPLANT PHONE NUMBER [phone] Phone:  [phone]     ondansetron 8 MG tablet          Primary Care Provider Office Phone # Fax #    Karo Doty [phone] [address]  [facility] [address]        Equal Access to Services     JEREMÍAS BUCK : Hadii aad ku hadasho Soomaali, waaxda luqadaha, qaybta kaalmada adeegyada, waxay idiin haychristinen catia escobar . So [facility] [phone].    ATENCIÓN: Si habla español, tiene a graham disposición servicios gratuitos de asistencia lingüística. Llame al [phone].    We comply with applicable federal civil rights laws and [place] laws. We do not discriminate on the basis of race, color, national origin, age, disability, sex, sexual orientation, or gender identity.            Thank you!     Thank you for choosing [facility]  for your care. Our goal is always to provide you with excellent care. Hearing back from our patients is one way we can continue to improve our services. Please take a few minutes to complete the written survey that you may receive in the mail after your visit with us. Thank you!             Your Updated Medication List - Protect others around you: Learn how to safely use, store and throw away your medicines at www.disposemymeds.org.          This list is  accurate as of 7/27/18 11:13 AM.  Always use your most recent med list.                   Brand Name Dispense Instructions for use Diagnosis    acetaminophen 500 MG tablet    TYLENOL     Take 1 tablet (500 mg) by mouth every 4 hours as needed    S/P DEMETRIO-BSO       ascorbic acid 250 MG tablet    VITAMIN C    90    250 mg    Routine general medical examination at a health care facility       calcium citrate-vitamin D 315-200 MG-UNIT Tabs per tablet    CITRACAL     Take 2 tablets by mouth daily        enoxaparin 80 MG/0.8ML injection    LOVENOX    60 Syringe    Inject 0.8 mLs (80 mg) Subcutaneous 2 times daily    Other acute pulmonary embolism without acute cor pulmonale (H)       FERROUS GLUCONATE PO      Take 325 mg by mouth daily (with breakfast)        GLUCOSAMINE CHONDROITIN COMPLX Tabs      Take  by mouth. 1500 mg 1xqd.    Routine general medical examination at a health care facility       LORazepam 1 MG tablet    ATIVAN    30 tablet    Take 1 tablet (1 mg) by mouth every 6 hours as needed (nausea/vomiting, anxiety or sleep)    Endometrial cancer (H), Encounter for long-term (current) use of medications       ondansetron 8 MG tablet    ZOFRAN    60 tablet    Take 1 tablet (8 mg) by mouth every 8 hours as needed for nausea (vomiting)    Endometrial cancer (H), Encounter for long-term (current) use of medications       prochlorperazine 10 MG tablet    COMPAZINE    30 tablet    Take 1 tablet (10 mg) by mouth every 6 hours as needed (nausea/vomiting)    Endometrial cancer (H), Encounter for long-term (current) use of medications       RANITIDINE HCL PO      Take 150 mg by mouth 2 times daily           Patient requests all Lab, Cardiology, and Radiology Results on their Discharge Instructions

## 2023-03-12 LAB
CULTURE RESULTS: SIGNIFICANT CHANGE UP
SPECIMEN SOURCE: SIGNIFICANT CHANGE UP

## 2023-03-14 RX ORDER — CEPHALEXIN 500 MG
1 CAPSULE ORAL
Qty: 20 | Refills: 0
Start: 2023-03-14 | End: 2023-03-18

## 2023-03-14 NOTE — ED POST DISCHARGE NOTE - REASON FOR FOLLOW-UP
Culture Follow-up Quality 130: Documentation Of Current Medications In The Medical Record: Current Medications Documented Quality 431: Preventive Care And Screening: Unhealthy Alcohol Use - Screening: Patient not identified as an unhealthy alcohol user when screened for unhealthy alcohol use using a systematic screening method Detail Level: Detailed Quality 226: Preventive Care And Screening: Tobacco Use: Screening And Cessation Intervention: Patient screened for tobacco use and is an ex/non-smoker

## 2023-04-02 NOTE — ED PROVIDER NOTE - NS_ATTENDINGSCRIBE_ED_ALL_ED
100
I personally performed the service described in the documentation recorded by the scribe in my presence, and it accurately and completely records my words and actions.

## 2023-06-01 NOTE — ED ADULT TRIAGE NOTE - BSA (M2)
1.47 Clofazimine Pregnancy And Lactation Text: This medication is Pregnancy Category C and isn't considered safe during pregnancy. It is excreted in breast milk. 01-Jun-2023 21:26

## 2023-06-12 ENCOUNTER — APPOINTMENT (OUTPATIENT)
Dept: OBGYN | Facility: CLINIC | Age: 28
End: 2023-06-12
Payer: MEDICAID

## 2023-06-12 ENCOUNTER — OUTPATIENT (OUTPATIENT)
Dept: OUTPATIENT SERVICES | Facility: HOSPITAL | Age: 28
LOS: 1 days | End: 2023-06-12
Payer: MEDICAID

## 2023-06-12 VITALS
BODY MASS INDEX: 26.76 KG/M2 | OXYGEN SATURATION: 100 % | DIASTOLIC BLOOD PRESSURE: 62 MMHG | RESPIRATION RATE: 18 BRPM | HEART RATE: 86 BPM | TEMPERATURE: 98 F | HEIGHT: 58 IN | WEIGHT: 127.5 LBS | SYSTOLIC BLOOD PRESSURE: 99 MMHG

## 2023-06-12 DIAGNOSIS — Z30.431 ENCOUNTER FOR ROUTINE CHECKING OF INTRAUTERINE CONTRACEPTIVE DEVICE: ICD-10-CM

## 2023-06-12 DIAGNOSIS — O99.891 OTHER SPECIFIED DISEASES AND CONDITIONS COMPLICATING PREGNANCY: ICD-10-CM

## 2023-06-12 DIAGNOSIS — Z91.89 OTHER SPECIFIED PERSONAL RISK FACTORS, NOT ELSEWHERE CLASSIFIED: ICD-10-CM

## 2023-06-12 DIAGNOSIS — Z23 ENCOUNTER FOR IMMUNIZATION: ICD-10-CM

## 2023-06-12 DIAGNOSIS — K59.00 DISEASES OF THE DIGESTIVE SYSTEM COMPLICATING PREGNANCY, UNSPECIFIED TRIMESTER: ICD-10-CM

## 2023-06-12 DIAGNOSIS — Z00.00 ENCOUNTER FOR GENERAL ADULT MEDICAL EXAMINATION WITHOUT ABNORMAL FINDINGS: ICD-10-CM

## 2023-06-12 DIAGNOSIS — O28.9 UNSPECIFIED ABNORMAL FINDINGS ON ANTENATAL SCREENING OF MOTHER: ICD-10-CM

## 2023-06-12 DIAGNOSIS — O36.5990 MATERNAL CARE FOR OTHER KNOWN OR SUSPECTED POOR FETAL GROWTH, UNSPECIFIED TRIMESTER, NOT APPLICABLE OR UNSPECIFIED: ICD-10-CM

## 2023-06-12 DIAGNOSIS — Z87.19 PERSONAL HISTORY OF OTHER DISEASES OF THE DIGESTIVE SYSTEM: ICD-10-CM

## 2023-06-12 DIAGNOSIS — O35.9XX0 MATERNAL CARE FOR (SUSPECTED) FETAL ABNORMALITY AND DAMAGE, UNSPECIFIED, NOT APPLICABLE OR UNSPECIFIED: ICD-10-CM

## 2023-06-12 DIAGNOSIS — O99.619 DISEASES OF THE DIGESTIVE SYSTEM COMPLICATING PREGNANCY, UNSPECIFIED TRIMESTER: ICD-10-CM

## 2023-06-12 DIAGNOSIS — Z20.820 OTHER SPECIFIED DISEASES AND CONDITIONS COMPLICATING PREGNANCY: ICD-10-CM

## 2023-06-12 DIAGNOSIS — Z34.92 ENCOUNTER FOR SUPERVISION OF NORMAL PREGNANCY, UNSPECIFIED, SECOND TRIMESTER: ICD-10-CM

## 2023-06-12 DIAGNOSIS — O09.93 SUPERVISION OF HIGH RISK PREGNANCY, UNSPECIFIED, THIRD TRIMESTER: ICD-10-CM

## 2023-06-12 DIAGNOSIS — Q91.7 TRISOMY 13, UNSPECIFIED: ICD-10-CM

## 2023-06-12 PROCEDURE — 99214 OFFICE O/P EST MOD 30 MIN: CPT

## 2023-06-12 PROCEDURE — 85025 COMPLETE CBC W/AUTO DIFF WBC: CPT

## 2023-06-12 PROCEDURE — G0463: CPT

## 2023-06-12 PROCEDURE — 84443 ASSAY THYROID STIM HORMONE: CPT

## 2023-06-12 PROCEDURE — 84146 ASSAY OF PROLACTIN: CPT

## 2023-06-12 PROCEDURE — 84702 CHORIONIC GONADOTROPIN TEST: CPT

## 2023-06-12 PROCEDURE — 84439 ASSAY OF FREE THYROXINE: CPT

## 2023-06-12 PROCEDURE — 83036 HEMOGLOBIN GLYCOSYLATED A1C: CPT

## 2023-06-12 PROCEDURE — 36415 COLL VENOUS BLD VENIPUNCTURE: CPT | Mod: NC

## 2023-06-12 PROCEDURE — 83001 ASSAY OF GONADOTROPIN (FSH): CPT

## 2023-06-12 PROCEDURE — 36415 COLL VENOUS BLD VENIPUNCTURE: CPT

## 2023-06-12 RX ORDER — FAMOTIDINE 10 MG/1
10 TABLET, FILM COATED ORAL DAILY
Qty: 30 | Refills: 1 | Status: COMPLETED | COMMUNITY
Start: 2022-09-01 | End: 2023-06-12

## 2023-06-12 RX ORDER — PRENATAL VIT NO.130/IRON/FOLIC 27MG-0.8MG
28-0.8 TABLET ORAL DAILY
Qty: 30 | Refills: 11 | Status: COMPLETED | COMMUNITY
Start: 2022-07-06 | End: 2023-06-12

## 2023-06-12 RX ORDER — DOCUSATE SODIUM - SENNOSIDES 50; 8.6 MG/1; MG/1
8.6-5 TABLET, FILM COATED ORAL DAILY
Qty: 60 | Refills: 6 | Status: COMPLETED | COMMUNITY
Start: 2022-07-06 | End: 2023-06-12

## 2023-06-12 RX ORDER — VARICELLA ZOSTER IMMUNE GLOBULIN (HUMAN) 125 [IU]/ML
125 SOLUTION INTRAMUSCULAR ONCE
Qty: 5 | Refills: 0 | Status: COMPLETED | COMMUNITY
Start: 2022-07-29 | End: 2023-06-12

## 2023-06-12 RX ORDER — ACYCLOVIR 800 MG/1
800 TABLET ORAL EVERY 4 HOURS
Qty: 35 | Refills: 0 | Status: COMPLETED | COMMUNITY
Start: 2022-08-10 | End: 2023-06-12

## 2023-06-12 NOTE — PHYSICAL EXAM
[Chaperone Present] : A chaperone was present in the examining room during all aspects of the physical examination [Appropriately responsive] : appropriately responsive [Alert] : alert [No Acute Distress] : no acute distress [Oriented x3] : oriented x3 [No Masses] : no breast masses were palpable [Labia Majora] : normal [Labia Minora] : normal [IUD String] : an IUD string was noted [Normal] : normal [FreeTextEntry5] : ~ 2 cm IUD string visualized extending out from external cervical os.

## 2023-06-12 NOTE — HISTORY OF PRESENT ILLNESS
[Patient reported PAP Smear was normal] : Patient reported PAP Smear was normal [Y] : Positive pregnancy history [FreeTextEntry1] : Pateint presents for consultation, s/p ParaGard IUD inserted (03/09/23), complaining of pelvic pain, non-stop vaginal spotting since IUD was inserted.  Patient didn't obtain US as provided last visit, couldn't’t get an appointment, new phone number given with referral today. \par \par Not breastfeeding. \par \par s/p ParaGard IUD insertion (03/09/23), states the pain and irregular bleeding started after IUD was inserted. \par \par LMP(06/07/23) [PapSmeardate] : 5/19/22 [LMPDate] : 6/7/23 [PGHxTotal] : 2 [[facility] CampusxFulerm] : 1 [PGHxPremature] : 0 [PGHxAbortions] : 1 [[facility] Medical Centeriving] : 1

## 2023-06-13 DIAGNOSIS — R10.2 PELVIC AND PERINEAL PAIN: ICD-10-CM

## 2023-06-13 DIAGNOSIS — N92.6 IRREGULAR MENSTRUATION, UNSPECIFIED: ICD-10-CM

## 2023-06-13 DIAGNOSIS — Z30.431 ENCOUNTER FOR ROUTINE CHECKING OF INTRAUTERINE CONTRACEPTIVE DEVICE: ICD-10-CM

## 2023-06-13 LAB
ESTIMATED AVERAGE GLUCOSE: 97 MG/DL
FSH SERPL-MCNC: 4.9 IU/L
HBA1C MFR BLD HPLC: 5 %
HCG SERPL-MCNC: <1 MIU/ML
PROLACTIN SERPL-MCNC: 18.8 NG/ML
T4 FREE SERPL-MCNC: 1 NG/DL
TSH SERPL-ACNC: 0.44 UIU/ML

## 2023-06-30 ENCOUNTER — OUTPATIENT (OUTPATIENT)
Dept: OUTPATIENT SERVICES | Facility: HOSPITAL | Age: 28
LOS: 1 days | End: 2023-06-30
Payer: MEDICAID

## 2023-06-30 ENCOUNTER — APPOINTMENT (OUTPATIENT)
Dept: ULTRASOUND IMAGING | Facility: HOSPITAL | Age: 28
End: 2023-06-30
Payer: MEDICAID

## 2023-06-30 DIAGNOSIS — Z98.890 OTHER SPECIFIED POSTPROCEDURAL STATES: Chronic | ICD-10-CM

## 2023-06-30 DIAGNOSIS — Z30.431 ENCOUNTER FOR ROUTINE CHECKING OF INTRAUTERINE CONTRACEPTIVE DEVICE: ICD-10-CM

## 2023-06-30 PROCEDURE — 76830 TRANSVAGINAL US NON-OB: CPT | Mod: 26

## 2023-06-30 PROCEDURE — 76856 US EXAM PELVIC COMPLETE: CPT | Mod: 26

## 2023-06-30 PROCEDURE — 76856 US EXAM PELVIC COMPLETE: CPT

## 2023-06-30 PROCEDURE — 76830 TRANSVAGINAL US NON-OB: CPT

## 2023-07-07 ENCOUNTER — OUTPATIENT (OUTPATIENT)
Dept: OUTPATIENT SERVICES | Facility: HOSPITAL | Age: 28
LOS: 1 days | End: 2023-07-07
Payer: MEDICAID

## 2023-07-07 ENCOUNTER — APPOINTMENT (OUTPATIENT)
Dept: OBGYN | Facility: CLINIC | Age: 28
End: 2023-07-07
Payer: MEDICAID

## 2023-07-07 VITALS
DIASTOLIC BLOOD PRESSURE: 78 MMHG | TEMPERATURE: 98.9 F | BODY MASS INDEX: 26.45 KG/M2 | HEIGHT: 58 IN | WEIGHT: 126 LBS | HEART RATE: 78 BPM | SYSTOLIC BLOOD PRESSURE: 118 MMHG | OXYGEN SATURATION: 100 %

## 2023-07-07 DIAGNOSIS — L30.1 DYSHIDROSIS [POMPHOLYX]: ICD-10-CM

## 2023-07-07 DIAGNOSIS — Z00.00 ENCOUNTER FOR GENERAL ADULT MEDICAL EXAMINATION WITHOUT ABNORMAL FINDINGS: ICD-10-CM

## 2023-07-07 DIAGNOSIS — Z98.890 OTHER SPECIFIED POSTPROCEDURAL STATES: Chronic | ICD-10-CM

## 2023-07-07 DIAGNOSIS — Z30.431 ENCOUNTER FOR ROUTINE CHECKING OF INTRAUTERINE CONTRACEPTIVE DEVICE: ICD-10-CM

## 2023-07-07 PROCEDURE — 99214 OFFICE O/P EST MOD 30 MIN: CPT | Mod: 25

## 2023-07-07 PROCEDURE — 58301 REMOVE INTRAUTERINE DEVICE: CPT

## 2023-07-07 PROCEDURE — G0463: CPT

## 2023-07-07 RX ORDER — TRETINOIN 0.25 MG/G
0.03 CREAM TOPICAL
Qty: 1 | Refills: 1 | Status: COMPLETED | COMMUNITY
Start: 2023-02-22 | End: 2023-07-07

## 2023-07-07 RX ORDER — BETAMETHASONE DIPROPIONATE 0.5 MG/G
0.05 OINTMENT TOPICAL TWICE DAILY
Qty: 1 | Refills: 3 | Status: COMPLETED | COMMUNITY
Start: 2023-02-22 | End: 2023-07-07

## 2023-07-07 RX ORDER — MEDROXYPROGESTERONE ACETATE 150 MG/ML
150 INJECTION, SUSPENSION INTRAMUSCULAR
Qty: 1 | Refills: 3 | Status: ACTIVE | COMMUNITY
Start: 2023-07-07 | End: 1900-01-01

## 2023-07-07 RX ORDER — COPPER 313.4 MG/1
INTRAUTERINE DEVICE INTRAUTERINE
Qty: 1 | Refills: 0 | Status: COMPLETED | COMMUNITY
Start: 2023-03-09 | End: 2023-07-07

## 2023-07-07 NOTE — REASON FOR VISIT
Is This A New Presentation, Or A Follow-Up?: Eyelid Dermatitis
How Severe Is Your Rash?: mild
[Follow-Up] : a follow-up evaluation of

## 2023-07-07 NOTE — PROCEDURE
[IUD Removal] : intrauterine device (IUD) removal [Menorrhagia] : menorrhagia [DUB] : DUB [Dysmenorrhea] : dysmenorrhea [Risks] : risks [Benefits] : benefits [Alternatives] : alternatives [Speculum Placed] : speculum placed [IUD Removed - Forceps] : IUD removed - forceps [IUD Discarded] : IUD discarded [Tolerated Well] : Patient tolerated the procedure well [No Complications] : no complications [___ Day(s)] : in [unfilled] day(s)

## 2023-07-07 NOTE — HISTORY OF PRESENT ILLNESS
[Patient reported PAP Smear was normal] : Patient reported PAP Smear was normal [FreeTextEntry1] : Results of Pelvic US and blood tests reviewed - WNL\par \par Patient requests IUD removal, can't tolerate the pelvic pain and irregular bleeding secondary to IUD in place.  Alternate options reviewed, pt will start Depo, but will return next week for administration, rx sent to pharmacy, advised to use condoms prior to return for Depo administration [PapSmeardate] : 5/19/2022 [TextBox_31] : WNL

## 2023-07-10 DIAGNOSIS — R10.2 PELVIC AND PERINEAL PAIN: ICD-10-CM

## 2023-07-10 DIAGNOSIS — N92.6 IRREGULAR MENSTRUATION, UNSPECIFIED: ICD-10-CM

## 2023-07-10 DIAGNOSIS — Z30.9 ENCOUNTER FOR CONTRACEPTIVE MANAGEMENT, UNSPECIFIED: ICD-10-CM

## 2023-07-10 DIAGNOSIS — Z30.432 ENCOUNTER FOR REMOVAL OF INTRAUTERINE CONTRACEPTIVE DEVICE: ICD-10-CM

## 2023-07-10 DIAGNOSIS — Z71.2 PERSON CONSULTING FOR EXPLANATION OF EXAMINATION OR TEST FINDINGS: ICD-10-CM

## 2023-07-10 DIAGNOSIS — Z30.013 ENCOUNTER FOR INITIAL PRESCRIPTION OF INJECTABLE CONTRACEPTIVE: ICD-10-CM

## 2023-07-14 ENCOUNTER — OUTPATIENT (OUTPATIENT)
Dept: OUTPATIENT SERVICES | Facility: HOSPITAL | Age: 28
LOS: 1 days | End: 2023-07-14
Payer: MEDICAID

## 2023-07-14 ENCOUNTER — APPOINTMENT (OUTPATIENT)
Dept: OBGYN | Facility: CLINIC | Age: 28
End: 2023-07-14
Payer: MEDICAID

## 2023-07-14 VITALS
TEMPERATURE: 98 F | DIASTOLIC BLOOD PRESSURE: 80 MMHG | RESPIRATION RATE: 18 BRPM | SYSTOLIC BLOOD PRESSURE: 118 MMHG | BODY MASS INDEX: 27.08 KG/M2 | WEIGHT: 129 LBS | HEIGHT: 58 IN | OXYGEN SATURATION: 100 % | HEART RATE: 82 BPM

## 2023-07-14 DIAGNOSIS — Z30.432 ENCOUNTER FOR REMOVAL OF INTRAUTERINE CONTRACEPTIVE DEVICE: ICD-10-CM

## 2023-07-14 DIAGNOSIS — Z87.42 PERSONAL HISTORY OF OTHER DISEASES OF THE FEMALE GENITAL TRACT: ICD-10-CM

## 2023-07-14 DIAGNOSIS — Z71.2 PERSON CONSULTING FOR EXPLANATION OF EXAMINATION OR TEST FINDINGS: ICD-10-CM

## 2023-07-14 DIAGNOSIS — R10.2 PELVIC AND PERINEAL PAIN: ICD-10-CM

## 2023-07-14 DIAGNOSIS — Z00.00 ENCOUNTER FOR GENERAL ADULT MEDICAL EXAMINATION WITHOUT ABNORMAL FINDINGS: ICD-10-CM

## 2023-07-14 DIAGNOSIS — Z98.890 OTHER SPECIFIED POSTPROCEDURAL STATES: Chronic | ICD-10-CM

## 2023-07-14 PROCEDURE — 81025 URINE PREGNANCY TEST: CPT

## 2023-07-14 PROCEDURE — 99213 OFFICE O/P EST LOW 20 MIN: CPT

## 2023-07-14 PROCEDURE — 96372 THER/PROPH/DIAG INJ SC/IM: CPT

## 2023-07-14 PROCEDURE — G0463: CPT

## 2023-07-14 RX ORDER — SPIRONOLACTONE 25 MG
600-10 TABLET ORAL DAILY
Qty: 60 | Refills: 11 | Status: ACTIVE | COMMUNITY
Start: 2023-07-14 | End: 1900-01-01

## 2023-07-14 NOTE — HISTORY OF PRESENT ILLNESS
[FreeTextEntry1] : Presents for Depo initiation:\par \par s/p ParaGard removal(07/07/23) feels much better no longer taking NSAIDS for pain relief

## 2023-07-18 DIAGNOSIS — Z30.013 ENCOUNTER FOR INITIAL PRESCRIPTION OF INJECTABLE CONTRACEPTIVE: ICD-10-CM

## 2023-07-18 DIAGNOSIS — Z30.9 ENCOUNTER FOR CONTRACEPTIVE MANAGEMENT, UNSPECIFIED: ICD-10-CM

## 2023-07-18 DIAGNOSIS — Z32.02 ENCOUNTER FOR PREGNANCY TEST, RESULT NEGATIVE: ICD-10-CM

## 2023-07-18 DIAGNOSIS — Z30.09 ENCOUNTER FOR OTHER GENERAL COUNSELING AND ADVICE ON CONTRACEPTION: ICD-10-CM

## 2023-07-18 DIAGNOSIS — Z30.42 ENCOUNTER FOR SURVEILLANCE OF INJECTABLE CONTRACEPTIVE: ICD-10-CM

## 2023-07-19 ENCOUNTER — EMERGENCY (EMERGENCY)
Facility: HOSPITAL | Age: 28
LOS: 1 days | Discharge: HOME | End: 2023-07-19
Attending: STUDENT IN AN ORGANIZED HEALTH CARE EDUCATION/TRAINING PROGRAM
Payer: MEDICAID

## 2023-07-19 ENCOUNTER — APPOINTMENT (OUTPATIENT)
Dept: DERMATOLOGY | Facility: CLINIC | Age: 28
End: 2023-07-19

## 2023-07-19 VITALS
HEIGHT: 56 IN | RESPIRATION RATE: 18 BRPM | WEIGHT: 129.63 LBS | HEART RATE: 97 BPM | DIASTOLIC BLOOD PRESSURE: 79 MMHG | TEMPERATURE: 98 F | SYSTOLIC BLOOD PRESSURE: 128 MMHG | OXYGEN SATURATION: 98 %

## 2023-07-19 VITALS
OXYGEN SATURATION: 99 % | TEMPERATURE: 98 F | SYSTOLIC BLOOD PRESSURE: 124 MMHG | HEART RATE: 84 BPM | DIASTOLIC BLOOD PRESSURE: 82 MMHG | RESPIRATION RATE: 18 BRPM

## 2023-07-19 DIAGNOSIS — Z98.891 HISTORY OF UTERINE SCAR FROM PREVIOUS SURGERY: Chronic | ICD-10-CM

## 2023-07-19 DIAGNOSIS — Z98.890 OTHER SPECIFIED POSTPROCEDURAL STATES: Chronic | ICD-10-CM

## 2023-07-19 PROCEDURE — 12001 RPR S/N/AX/GEN/TRNK 2.5CM/<: CPT

## 2023-07-19 PROCEDURE — 99284 EMERGENCY DEPT VISIT MOD MDM: CPT | Mod: 25

## 2023-07-19 PROCEDURE — 99283 EMERGENCY DEPT VISIT LOW MDM: CPT | Mod: 25

## 2023-07-19 RX ORDER — LIDOCAINE HCL 20 MG/ML
3 VIAL (ML) INJECTION ONCE
Refills: 0 | Status: COMPLETED | OUTPATIENT
Start: 2023-07-19 | End: 2023-07-19

## 2023-07-19 RX ADMIN — Medication 1 TABLET(S): at 18:44

## 2023-07-19 RX ADMIN — Medication 3 MILLILITER(S): at 17:47

## 2023-07-19 NOTE — ED PROVIDER NOTE - PATIENT PORTAL LINK FT
You can access the FollowMyHealth Patient Portal offered by [facility] by registering at the following website: [email or web address]. By joining [facility]’s FollowMyHealth portal, you will also be able to view your health information using other applications (apps) compatible with our system.

## 2023-07-19 NOTE — ED ADULT NURSE NOTE - NSFALLUNIVINTERV_ED_ALL_ED
Bed/Stretcher in lowest position, wheels locked, appropriate side rails in place/Call bell, personal items and telephone in reach/Instruct patient to call for assistance before getting out of bed/chair/stretcher/Non-slip footwear applied when patient is off stretcher/[place] to call system/Physically safe environment - no spills, clutter or unnecessary equipment/Purposeful proactive rounding/Room/bathroom lighting operational, light cord in reach

## 2023-07-19 NOTE — ED PROVIDER NOTE - CARE PROVIDER_API CALL
Stephen Haskins  Plastic Surgery  [address]  [place] [address]  Phone: [phone]  Fax: [phone]  Follow Up Time:     Payal Sexton  Surgery  [address], [address] [place] [address]  Phone: [phone]  Fax: [phone]  Follow Up Time:     Westley Mejia  Plastic Surgery  [address]  [place], [place] [id number]  Phone: [phone]  Fax: [phone]  Follow Up Time:

## 2023-07-19 NOTE — ED ADULT NURSE NOTE - OBJECTIVE STATEMENT
28 yo female sitting on a chair c/o laceration on the left second finger that she incurred after cutting her food with a knife at around noon time today.

## 2023-07-19 NOTE — ED PROVIDER NOTE - CPE EDP PSYCH NORM
4y11m F no pmh, imms utd, all-peanuts, p/w chin laceration. mother states she was on the tile floor, having a tantrum and banged chin on tile floor and sustained lac. no loc, n/v. no numbness, weakness. acting normal. pmd dr boyd.    on exam, AFVSS, well monique nad, ncat, eomi, perrla, mmm, 2cm linear/elliptical chin laceration, no FB, clean, no active bleeding, cn intact,, aaox3, no focal deficits, no le edema or calf ttp,     a/p; clean wound, lac repair, wound care instructions, dc home, f/u 5-7 days suture removal, strict return precautions provided
normal...

## 2023-07-19 NOTE — ED PROVIDER NOTE - ATTENDING APP SHARED VISIT CONTRIBUTION OF CARE
27 year old female with no pertinent medical history presents to ED complaining of left 2nd finger injury accidentally cutting it with a knife today. denies other injury. denies focal numbness/weakness. denies f/c/cough/cp/sob/abdominal pain/n/v/d. on exam, VS wnl,   GENERAL:  Awake, alert and in NAD, non-toxic appearing  ENMT: Airway patent  EYES: conjunctiva clear  CARDIAC: Regular rate, regular rhythm.  Heart sounds S1, S2, no S3, S4. No murmurs, rubs or gallops.  RESPIRATORY: Breath sounds clear and equal in bilateral anterior lung fields, no wheezes/ronchi/crackles/stridor; pt breathing and speaking comfortably with no increased WOB, no accessory mm. use, no intercostal retractions, no nasal flaring  GI: abdomen soft, non-distended, non-tender, no rebound or guarding.  SKIN: warm and dry, no rashes  PSYCH: awake, alert, calm and cooperative  EXTREMITIES:   BUE: 5/5 motor strength bilat deltoid, biceps, triceps, wrist flexors/extensors, hand . sensation intact to light touch bilat C5-T1; 2+ radial pulses bilat; compartment soft, skin warm and dry   L digit 2: avulsion of medial aspect of skin and fingernail of L digit 2, no active bleeding, no nailbed laceration, silt full finger, from mcp/dip/pip  silt and full motor strength and rom medial/radial/ulnar distributions bilat   NEURO: gcs 15    laceration repaired by alec HARMAN dcd with abx and pmd f/u

## 2023-07-19 NOTE — ED PROVIDER NOTE - PROGRESS NOTE DETAILS
hemostasis achieved with surgicell.  Patient nontoxic and medically stable for discharge.   Return precautions provided and patient understands to return to the ED for concerning or worsening signs and symptoms. Instructed to follow up with hand surgeon and agreeable. Patient's questions answered.

## 2023-07-19 NOTE — ED PROVIDER NOTE - SKIN, MLM
Soft tissue fingertip avulsion to radial surface of left 2nd finger with slight involvement of distal aspect of fingernail. Active oozing-type bleeding. No difficulty with moving any digits of hand. Soft tissue fingertip avulsion to radial surface of left 2nd finger with slight involvement of distal aspect of fingernail. Active oozing-type bleeding. No pulsitile bleeding, no deep structures visible on exam. No difficulty with moving any digits of hand.

## 2023-07-19 NOTE — ED PROVIDER NOTE - OBJECTIVE STATEMENT
27 year old female with no pertinent medical history presents to ED complaining of left 2nd finger injury. She relates that she was cutting vegetables today at 12:00 and accidentally cut her finger. Reports going to urgent care where she was told that nothing could be done because she had "cut a blood vessel." Patient states that neither she nor urgent care cleaned the wound prior to arrival.  Allergies: Tylenol (palpitation) 27 year old female with no pertinent medical history presents to ED complaining of left 2nd finger injury. She relates that she was cutting vegetables today at 12:00 and accidentally cut her finger. Reports going to urgent care where she was told that nothing could be done because she had "cut a blood vessel." and referred her to the ED.   Allergies: Tylenol (palpitation)

## 2023-07-19 NOTE — ED ADULT TRIAGE NOTE - CHIEF COMPLAINT QUOTE
As per pt, c/o L 2nd digit laceration s/p cutting her finger with knife while preparing food at home at 12pm today. Active bleeding noted. No other obvious traumas noted. Pt denies all other symptoms. LMP 06/2023

## 2023-07-19 NOTE — ED PROVIDER NOTE - CARE PROVIDERS DIRECT ADDRESSES
[facility]  [address]  [place] [address]  Phone:  [phone]  Fax:  [phone]                                    Ritchie Altman   MRN: [id number]    Department:  [facility]   Date of Visit:  5/21/2020           After Visit Summary Signature Page    I have received my discharge instructions, and my questions have been answered. I have discussed any challenges I see with this plan with the nurse or doctor.    ..........................................................................................................................................  Patient/Patient Representative Signature      ..........................................................................................................................................  Patient Representative Print Name and Relationship to Patient    ..................................................               ................................................  Date                                   Time    ..........................................................................................................................................  Reviewed by Signature/Title    ...................................................              ..............................................  Date                                               Time          22EPIC Rev 08/18        ,DirectAddress_Unknown,DirectAddress_Unknown,DirectAddress_Unknown

## 2023-07-19 NOTE — ED PROVIDER NOTE - PROVIDER TOKENS
PROVIDER:[TOKEN:[[id number]]],PROVIDER:[TOKEN:[[id number]]],PROVIDER:[TOKEN:[[id number]]]

## 2023-07-19 NOTE — ED PROVIDER NOTE - NSFOLLOWUPINSTRUCTIONS_ED_ALL_ED_FT

## 2023-07-19 NOTE — ED PROVIDER NOTE - CLINICAL SUMMARY MEDICAL DECISION MAKING FREE TEXT BOX
27 year old female with distal fingertip avulsion with mild active oozing-type bleeding. Will clean out wound and perform hemostasis via Surgicel and wrap. Will provide outpatient hand follow-up. Per chart review, patient had tetanus shot on 10/16/22.

## 2023-07-19 NOTE — ED ADULT NURSE NOTE - CAS ELECT INFOMATION PROVIDED
No pharmacy selected so I picked here where last Jardiance script was sent.     I was about to send Farxiga to replace Jardiance to the  pharmacy, but he never got Jardiance from our pharmacy before.     Please find out where he wants the new script sent and send it to his preferred pharmacy     Discharge instructions and papers were provided by ROSELINE Eaton to the patient./DC instructions

## 2023-08-03 ENCOUNTER — EMERGENCY (EMERGENCY)
Facility: HOSPITAL | Age: 28
LOS: 1 days | Discharge: HOME | End: 2023-08-03
Attending: EMERGENCY MEDICINE
Payer: MEDICAID

## 2023-08-03 VITALS
WEIGHT: 130.07 LBS | TEMPERATURE: 98 F | OXYGEN SATURATION: 100 % | HEART RATE: 89 BPM | HEIGHT: 56 IN | DIASTOLIC BLOOD PRESSURE: 79 MMHG | RESPIRATION RATE: 18 BRPM | SYSTOLIC BLOOD PRESSURE: 109 MMHG

## 2023-08-03 VITALS
DIASTOLIC BLOOD PRESSURE: 79 MMHG | SYSTOLIC BLOOD PRESSURE: 118 MMHG | OXYGEN SATURATION: 100 % | TEMPERATURE: 98 F | HEART RATE: 77 BPM | RESPIRATION RATE: 18 BRPM

## 2023-08-03 DIAGNOSIS — Z98.891 HISTORY OF UTERINE SCAR FROM PREVIOUS SURGERY: Chronic | ICD-10-CM

## 2023-08-03 DIAGNOSIS — Z98.890 OTHER SPECIFIED POSTPROCEDURAL STATES: Chronic | ICD-10-CM

## 2023-08-03 LAB
ALBUMIN SERPL ELPH-MCNC: 3.6 G/DL — SIGNIFICANT CHANGE UP (ref 3.5–5)
ALP SERPL-CCNC: 120 U/L — SIGNIFICANT CHANGE UP (ref 40–120)
ALT FLD-CCNC: 54 U/L DA — SIGNIFICANT CHANGE UP (ref 10–60)
ANION GAP SERPL CALC-SCNC: 7 MMOL/L — SIGNIFICANT CHANGE UP (ref 5–17)
AST SERPL-CCNC: 30 U/L — SIGNIFICANT CHANGE UP (ref 10–40)
BASOPHILS # BLD AUTO: 0.07 K/UL — SIGNIFICANT CHANGE UP (ref 0–0.2)
BASOPHILS NFR BLD AUTO: 0.5 % — SIGNIFICANT CHANGE UP (ref 0–2)
BILIRUB SERPL-MCNC: 0.2 MG/DL — SIGNIFICANT CHANGE UP (ref 0.2–1.2)
BUN SERPL-MCNC: 18 MG/DL — SIGNIFICANT CHANGE UP (ref 7–18)
CALCIUM SERPL-MCNC: 9.3 MG/DL — SIGNIFICANT CHANGE UP (ref 8.4–10.5)
CHLORIDE SERPL-SCNC: 108 MMOL/L — SIGNIFICANT CHANGE UP (ref 96–108)
CO2 SERPL-SCNC: 25 MMOL/L — SIGNIFICANT CHANGE UP (ref 22–31)
CREAT SERPL-MCNC: 0.73 MG/DL — SIGNIFICANT CHANGE UP (ref 0.5–1.3)
EGFR: 115 ML/MIN/1.73M2 — SIGNIFICANT CHANGE UP
EOSINOPHIL # BLD AUTO: 0.23 K/UL — SIGNIFICANT CHANGE UP (ref 0–0.5)
EOSINOPHIL NFR BLD AUTO: 1.5 % — SIGNIFICANT CHANGE UP (ref 0–6)
GLUCOSE SERPL-MCNC: 95 MG/DL — SIGNIFICANT CHANGE UP (ref 70–99)
HCG UR QL: NEGATIVE — SIGNIFICANT CHANGE UP
HCT VFR BLD CALC: 37.1 % — SIGNIFICANT CHANGE UP (ref 34.5–45)
HGB BLD-MCNC: 11.9 G/DL — SIGNIFICANT CHANGE UP (ref 11.5–15.5)
IMM GRANULOCYTES NFR BLD AUTO: 0.3 % — SIGNIFICANT CHANGE UP (ref 0–0.9)
LYMPHOCYTES # BLD AUTO: 25.2 % — SIGNIFICANT CHANGE UP (ref 13–44)
LYMPHOCYTES # BLD AUTO: 3.76 K/UL — HIGH (ref 1–3.3)
MCHC RBC-ENTMCNC: 24 PG — LOW (ref 27–34)
MCHC RBC-ENTMCNC: 32.1 GM/DL — SIGNIFICANT CHANGE UP (ref 32–36)
MCV RBC AUTO: 74.9 FL — LOW (ref 80–100)
MONOCYTES # BLD AUTO: 0.73 K/UL — SIGNIFICANT CHANGE UP (ref 0–0.9)
MONOCYTES NFR BLD AUTO: 4.9 % — SIGNIFICANT CHANGE UP (ref 2–14)
NEUTROPHILS # BLD AUTO: 10.06 K/UL — HIGH (ref 1.8–7.4)
NEUTROPHILS NFR BLD AUTO: 67.6 % — SIGNIFICANT CHANGE UP (ref 43–77)
NRBC # BLD: 0 /100 WBCS — SIGNIFICANT CHANGE UP (ref 0–0)
PLATELET # BLD AUTO: 403 K/UL — HIGH (ref 150–400)
POTASSIUM SERPL-MCNC: 4.2 MMOL/L — SIGNIFICANT CHANGE UP (ref 3.5–5.3)
POTASSIUM SERPL-SCNC: 4.2 MMOL/L — SIGNIFICANT CHANGE UP (ref 3.5–5.3)
PROT SERPL-MCNC: 8.6 G/DL — HIGH (ref 6–8.3)
RBC # BLD: 4.95 M/UL — SIGNIFICANT CHANGE UP (ref 3.8–5.2)
RBC # FLD: 15.5 % — HIGH (ref 10.3–14.5)
SODIUM SERPL-SCNC: 140 MMOL/L — SIGNIFICANT CHANGE UP (ref 135–145)
WBC # BLD: 14.9 K/UL — HIGH (ref 3.8–10.5)
WBC # FLD AUTO: 14.9 K/UL — HIGH (ref 3.8–10.5)

## 2023-08-03 PROCEDURE — 81025 URINE PREGNANCY TEST: CPT

## 2023-08-03 PROCEDURE — 85025 COMPLETE CBC W/AUTO DIFF WBC: CPT

## 2023-08-03 PROCEDURE — 99283 EMERGENCY DEPT VISIT LOW MDM: CPT

## 2023-08-03 PROCEDURE — 80053 COMPREHEN METABOLIC PANEL: CPT

## 2023-08-03 PROCEDURE — 36415 COLL VENOUS BLD VENIPUNCTURE: CPT

## 2023-08-03 PROCEDURE — 99284 EMERGENCY DEPT VISIT MOD MDM: CPT

## 2023-08-03 RX ORDER — SODIUM CHLORIDE 9 MG/ML
1000 INJECTION INTRAMUSCULAR; INTRAVENOUS; SUBCUTANEOUS ONCE
Refills: 0 | Status: COMPLETED | OUTPATIENT
Start: 2023-08-03 | End: 2023-08-03

## 2023-08-03 RX ADMIN — SODIUM CHLORIDE 1000 MILLILITER(S): 9 INJECTION INTRAMUSCULAR; INTRAVENOUS; SUBCUTANEOUS at 19:19

## 2023-08-03 NOTE — ED PROVIDER NOTE - NSFOLLOWUPINSTRUCTIONS_ED_ALL_ED_FT
If diarrhea recurs, try over-the-counter Imodium AD (loperamide).        Diarrhea, Adult  Diarrhea is frequent loose and watery bowel movements. Diarrhea can make you feel weak and cause you to become dehydrated. Dehydration can make you tired and thirsty, cause you to have a dry mouth, and decrease how often you urinate.    Diarrhea typically lasts 2–3 days. However, it can last longer if it is a sign of something more serious. It is important to treat your diarrhea as told by your health care provider.    Follow these instructions at home:  Eating and drinking    A bottle of clear fruit juice and glass of water.   Bread, rice, and cereal from the grain group.  Follow these recommendations as told by your health care provider:  Take an oral rehydration solution (ORS). This is an over-the-counter medicine that helps return your body to its normal balance of nutrients and water. It is found at pharmacies and retail stores.  Drink plenty of fluids, such as water, ice chips, diluted fruit juice, and low-calorie sports drinks. You can drink milk also, if desired.  Avoid drinking fluids that contain a lot of sugar or caffeine, such as energy drinks, sports drinks, and soda.  Eat bland, easy-to-digest foods in small amounts as you are able. These foods include bananas, applesauce, rice, lean meats, toast, and crackers.  Avoid alcohol.  Avoid spicy or fatty foods.  Medicines    Take over-the-counter and prescription medicines only as told by your health care provider.  If you were prescribed an antibiotic medicine, take it as told by your health care provider. Do not stop using the antibiotic even if you start to feel better.  General instructions    Washing hands with soap and water.  Wash your hands often using soap and water. If soap and water are not available, use a hand . Others in the household should wash their hands as well. Hands should be washed:  After using the toilet or changing a diaper.  Before preparing, cooking, or serving food.  While caring for a sick person or while visiting someone in a hospital.  Drink enough fluid to keep your urine pale yellow.  Rest at home while you recover.  Watch your condition for any changes.  Take a warm bath to relieve any burning or pain from frequent diarrhea episodes.  Keep all follow-up visits as told by your health care provider. This is important.  Contact a health care provider if:  You have a fever.  Your diarrhea gets worse.  You have new symptoms.  You cannot keep fluids down.  You feel light-headed or dizzy.  You have a headache.  You have muscle cramps.  Get help right away if:  You have chest pain.  You feel extremely weak or you faint.  You have bloody or black stools or stools that look like tar.  You have severe pain, cramping, or bloating in your abdomen.  You have trouble breathing or you are breathing very quickly.  Your heart is beating very quickly.  Your skin feels cold and clammy.  You feel confused.  You have signs of dehydration, such as:  Dark urine, very little urine, or no urine.  Cracked lips.  Dry mouth.  Sunken eyes.  Sleepiness.  Weakness.  Summary  Diarrhea is frequent loose and sometimes watery bowel movements. Diarrhea can make you feel weak and cause you to become dehydrated.  Drink enough fluids to keep your urine pale yellow.  Make sure that you wash your hands after using the toilet. If soap and water are not available, use hand .  Contact a health care provider if your diarrhea gets worse or you have new symptoms.  Get help right away if you have signs of dehydration.  This information is not intended to replace advice given to you by your health care provider. Make sure you discuss any questions you have with your health care provider.    Document Revised: 03/10/2023 Document Reviewed: 06/29/2022  [employer] Patient Education © 2023 [employer]

## 2023-08-03 NOTE — ED PROVIDER NOTE - OBJECTIVE STATEMENT
27-year-old woman, no past medical history, complains of diarrhea for about 4 days, she did have a some chills and mild associated abdominal pain.  She denies any nausea or vomiting or fever.  She did not have any urinary symptoms.  Her son has similar symptoms.  She thinks she may have also consumed milk that might have been spoiled.  She was recently on an antibiotic for a finger infection which improved.  She also had Lomotil at home and tried 2 doses of it and has had some improvement in her diarrhea since then.  No recent travel.

## 2023-08-03 NOTE — ED PROVIDER NOTE - CLINICAL SUMMARY MEDICAL DECISION MAKING FREE TEXT BOX
27-year-old woman, no past medical history, complains of diarrhea for about 4 days, she did have a some chills and mild associated abdominal pain.  She denies any nausea or vomiting or fever--normal abdominal exam and no fever; labs show mild elevation in WBC but Pt well-appearing and suspect viral gastroenteritis.  Advised strict return precautions and PMD f/u.

## 2023-08-03 NOTE — ED PROVIDER NOTE - PATIENT PORTAL LINK FT
You can access the FollowMyHealth Patient Portal offered by [facility] by registering at the following website: [email or web address]. By joining [employer]’s FollowMyHealth portal, you will also be able to view your health information using other applications (apps) compatible with our system.

## 2023-08-03 NOTE — ED ADULT NURSE NOTE - NSFALLUNIVINTERV_ED_ALL_ED
Bed/Stretcher in lowest position, wheels locked, appropriate side rails in place/Call bell, personal items and telephone in reach/Instruct patient to call for assistance before getting out of bed/chair/stretcher/Non-slip footwear applied when patient is off stretcher/[place] to call system/Physically safe environment - no spills, clutter or unnecessary equipment/Purposeful proactive rounding/Room/bathroom lighting operational, light cord in reach

## 2023-08-03 NOTE — ED ADULT NURSE NOTE - OBJECTIVE STATEMENT
Pt a&ox3, in ED for diarrhea x Monday, which stopped today after taking an antidiarrhea medication, c/o abd cramping when having bowel movements, no SOB, unlabored breathing, equal rise & fall, able to speak in full sentences, no N/V, or CP, denies PMH/PSH.

## 2023-08-25 ENCOUNTER — APPOINTMENT (OUTPATIENT)
Dept: DERMATOLOGY | Facility: CLINIC | Age: 28
End: 2023-08-25
Payer: MEDICAID

## 2023-08-25 VITALS — WEIGHT: 129 LBS | HEIGHT: 56 IN | BODY MASS INDEX: 29.02 KG/M2

## 2023-08-25 PROCEDURE — 99214 OFFICE O/P EST MOD 30 MIN: CPT

## 2023-08-25 RX ORDER — BENZOYL PEROXIDE 5 G/100G
5 GEL TOPICAL
Qty: 1 | Refills: 2 | Status: ACTIVE | COMMUNITY
Start: 2023-08-25 | End: 1900-01-01

## 2023-08-25 RX ORDER — AZELAIC ACID 0.15 G/G
15 GEL TOPICAL DAILY
Qty: 1 | Refills: 2 | Status: ACTIVE | COMMUNITY
Start: 2023-08-25 | End: 1900-01-01

## 2023-08-25 RX ORDER — SPIRONOLACTONE 50 MG/1
50 TABLET ORAL
Qty: 60 | Refills: 2 | Status: ACTIVE | COMMUNITY
Start: 2023-08-25 | End: 1900-01-01

## 2023-08-25 RX ORDER — TRETINOIN 0.25 MG/G
0.03 CREAM TOPICAL
Qty: 1 | Refills: 11 | Status: ACTIVE | COMMUNITY
Start: 2023-08-25 | End: 1900-01-01

## 2023-10-02 ENCOUNTER — APPOINTMENT (OUTPATIENT)
Dept: OBGYN | Facility: CLINIC | Age: 28
End: 2023-10-02
Payer: MEDICAID

## 2023-10-02 ENCOUNTER — OUTPATIENT (OUTPATIENT)
Dept: OUTPATIENT SERVICES | Facility: HOSPITAL | Age: 28
LOS: 1 days | End: 2023-10-02
Payer: MEDICAID

## 2023-10-02 VITALS
RESPIRATION RATE: 18 BRPM | SYSTOLIC BLOOD PRESSURE: 121 MMHG | WEIGHT: 132 LBS | OXYGEN SATURATION: 100 % | HEIGHT: 56 IN | BODY MASS INDEX: 29.69 KG/M2 | TEMPERATURE: 98.8 F | HEART RATE: 96 BPM | DIASTOLIC BLOOD PRESSURE: 80 MMHG

## 2023-10-02 DIAGNOSIS — Z98.891 HISTORY OF UTERINE SCAR FROM PREVIOUS SURGERY: Chronic | ICD-10-CM

## 2023-10-02 DIAGNOSIS — Z30.013 ENCOUNTER FOR INITIAL PRESCRIPTION OF INJECTABLE CONTRACEPTIVE: ICD-10-CM

## 2023-10-02 DIAGNOSIS — Z98.890 OTHER SPECIFIED POSTPROCEDURAL STATES: Chronic | ICD-10-CM

## 2023-10-02 DIAGNOSIS — Z00.00 ENCOUNTER FOR GENERAL ADULT MEDICAL EXAMINATION WITHOUT ABNORMAL FINDINGS: ICD-10-CM

## 2023-10-02 PROCEDURE — 96372 THER/PROPH/DIAG INJ SC/IM: CPT | Mod: NC

## 2023-10-02 PROCEDURE — G0463: CPT

## 2023-10-02 PROCEDURE — 81025 URINE PREGNANCY TEST: CPT

## 2023-10-02 PROCEDURE — 96372 THER/PROPH/DIAG INJ SC/IM: CPT

## 2023-10-03 DIAGNOSIS — Z30.42 ENCOUNTER FOR SURVEILLANCE OF INJECTABLE CONTRACEPTIVE: ICD-10-CM

## 2023-10-23 ENCOUNTER — APPOINTMENT (OUTPATIENT)
Dept: DERMATOLOGY | Facility: CLINIC | Age: 28
End: 2023-10-23

## 2023-11-03 NOTE — OB PROVIDER DELIVERY SUMMARY - NSANESTHESIALABOR_OBGYN_ALL_OB
Epidural Xolair Counseling:  Patient informed of potential adverse effects including but not limited to fever, muscle aches, rash and allergic reactions.  The patient verbalized understanding of the proper use and possible adverse effects of Xolair.  All of the patient's questions and concerns were addressed.

## 2023-11-06 ENCOUNTER — APPOINTMENT (OUTPATIENT)
Dept: DERMATOLOGY | Facility: CLINIC | Age: 28
End: 2023-11-06

## 2023-12-22 ENCOUNTER — OUTPATIENT (OUTPATIENT)
Dept: OUTPATIENT SERVICES | Facility: HOSPITAL | Age: 28
LOS: 1 days | End: 2023-12-22
Payer: COMMERCIAL

## 2023-12-22 ENCOUNTER — APPOINTMENT (OUTPATIENT)
Dept: OBGYN | Facility: CLINIC | Age: 28
End: 2023-12-22
Payer: MEDICAID

## 2023-12-22 VITALS
RESPIRATION RATE: 18 BRPM | DIASTOLIC BLOOD PRESSURE: 73 MMHG | BODY MASS INDEX: 29.92 KG/M2 | HEART RATE: 83 BPM | WEIGHT: 133 LBS | HEIGHT: 56 IN | TEMPERATURE: 97.9 F | SYSTOLIC BLOOD PRESSURE: 111 MMHG | OXYGEN SATURATION: 99 %

## 2023-12-22 DIAGNOSIS — Z30.42 ENCOUNTER FOR SURVEILLANCE OF INJECTABLE CONTRACEPTIVE: ICD-10-CM

## 2023-12-22 DIAGNOSIS — Z30.09 ENCOUNTER FOR OTHER GENERAL COUNSELING AND ADVICE ON CONTRACEPTION: ICD-10-CM

## 2023-12-22 DIAGNOSIS — L90.5 SCAR CONDITIONS AND FIBROSIS OF SKIN: ICD-10-CM

## 2023-12-22 DIAGNOSIS — Z87.2 PERSONAL HISTORY OF DISEASES OF THE SKIN AND SUBCUTANEOUS TISSUE: ICD-10-CM

## 2023-12-22 DIAGNOSIS — Z00.00 ENCOUNTER FOR GENERAL ADULT MEDICAL EXAMINATION WITHOUT ABNORMAL FINDINGS: ICD-10-CM

## 2023-12-22 DIAGNOSIS — L73.0 ACNE KELOID: ICD-10-CM

## 2023-12-22 DIAGNOSIS — Z98.891 HISTORY OF UTERINE SCAR FROM PREVIOUS SURGERY: Chronic | ICD-10-CM

## 2023-12-22 DIAGNOSIS — Z32.02 ENCOUNTER FOR PREGNANCY TEST, RESULT NEGATIVE: ICD-10-CM

## 2023-12-22 DIAGNOSIS — Z98.890 OTHER SPECIFIED POSTPROCEDURAL STATES: Chronic | ICD-10-CM

## 2023-12-22 DIAGNOSIS — Z98.891 HISTORY OF UTERINE SCAR FROM PREVIOUS SURGERY: ICD-10-CM

## 2023-12-22 PROCEDURE — 99213 OFFICE O/P EST LOW 20 MIN: CPT

## 2023-12-22 PROCEDURE — G0463: CPT

## 2023-12-22 NOTE — HISTORY OF PRESENT ILLNESS
[FreeTextEntry1] : Patient presents for contraception consultation:   Hx/o Mirena IUD insertion post  (10/15/22) fell out (23), was complaining of irregular bleeding and weight gain duration of Mirena use; s/p ParaGard insertion (23) complaining of irregular bleeding and pelvic pain, removed (23).  Started Depo for contraception initial administration (23) x 2 doses, presents for 3rd dose today, complaining of rapid weight gain and increase in appetitive, wants to retry Mirena IUD.  Patient was counseled she complained of similar symptoms with Mirena use.  Patient advised to try 4-6 weeks hormone free and see if it changes her appetite and weight and RTO for further evaluation of hormonal or non-hormonal contraception.   Last Pap (22) Negative [TextBox_4] : Pt would not want to continue depo and is interested in getting the mirena iud.  [PapSmeardate] : 5/19/22 [TextBox_31] : wnl

## 2023-12-22 NOTE — PLAN
[FreeTextEntry1] : RTO 6 weeks for evaluation after stopping hormonal contraception for alternate options.

## 2023-12-26 DIAGNOSIS — Z30.09 ENCOUNTER FOR OTHER GENERAL COUNSELING AND ADVICE ON CONTRACEPTION: ICD-10-CM

## 2023-12-26 DIAGNOSIS — Z11.3 ENCOUNTER FOR SCREENING FOR INFECTIONS WITH A PREDOMINANTLY SEXUAL MODE OF TRANSMISSION: ICD-10-CM

## 2023-12-26 DIAGNOSIS — Z98.891 HISTORY OF UTERINE SCAR FROM PREVIOUS SURGERY: ICD-10-CM

## 2024-01-18 NOTE — OB RN DELIVERY SUMMARY - NSNUMBEROFNEWBORNS_OBGYN_ALL_OB_NU
1/18/2024         RE: Jonn Watson  [address] [place] [address]        Dear Colleague,    Thank you for referring your patient, Jonn Watson, to the [facility]. Please see a copy of my visit note below.    Jonn is a 61 year old who is being evaluated via a billable telephone visit.      What phone number would you like to be contacted at? [phone]  How would you like to obtain your AVS? MyChart    Distant Location (provider location):  On-site  Distant Location (patient location): at home    A/P: T11 compression fracture 9/11/2023 treated with TLSO brace, attending Dr. Welch         Plan: She has been advised to wean from their brace by removing the brace for 1-2 hours a day, increasing each day by 1-2 hour increments.  If at any time during the wean you experience excessive fatigue, back pain or spasm, back down to the previous level for a day or so, then resume schedule.  Once out of brace for a full 8 hours, discontinue altogether or wear only as needed for comfort. She is very concerned about a lump under her breast along left rib cage, not visualized on prior images will order CT chest to be completed and will call with results advised that based on results would refer back to her PCP for further evaluation of which she states understanding. In regards to her fracture no further follow up indicated.       Subjective  Jonn is a 61 year old, presenting via telephone appointment for a T11 compression fracture 9/11/2023 treated with TLSO brace. Presently she states that she is doing okay she had slight increased in her back pain over the past week and she describes it as a tightness. She denies any radicular lower extremity pain numbness tingling or weakness. She denies gait instability or changes in bowel or bladder habits. She notes a tender lump under left breast along her rib that she states is soft lesion. She had a mammogram as well as ultrasound of  which both were negative.       Objective      Vitals:  No vitals were obtained today due to virtual visit.      Review of Systems  Constitutional, HEENT, cardiovascular, pulmonary, gi and gu systems are negative, except as otherwise noted.  Physical Exam   General: Alert and no distress //Respiratory: No audible wheeze, cough, or shortness of breath // Psychiatric:  Appropriate affect, tone, and pace of words      Images reviewed personally stable appearance of T11 fracture     IMPRESSION: No significant loss of vertebral body height at T11. Otherwise preserved vertebral body heights in the thoracic spine. Preserved vertebral body alignment. No acute finding in the extraspinal structures.         Phone call duration: 15 minutes  Signed Electronically by: Janet De La Garza PA-C         Again, thank you for allowing me to participate in the care of your patient.        Sincerely,        Janet De La Garza PA-C   1

## 2024-01-29 ENCOUNTER — EMERGENCY (EMERGENCY)
Facility: HOSPITAL | Age: 29
LOS: 1 days | Discharge: HOME | End: 2024-01-29
Attending: EMERGENCY MEDICINE
Payer: MEDICAID

## 2024-01-29 VITALS
DIASTOLIC BLOOD PRESSURE: 83 MMHG | RESPIRATION RATE: 18 BRPM | OXYGEN SATURATION: 100 % | SYSTOLIC BLOOD PRESSURE: 126 MMHG | WEIGHT: 133.16 LBS | HEART RATE: 98 BPM | HEIGHT: 56 IN | TEMPERATURE: 98 F

## 2024-01-29 DIAGNOSIS — Z98.891 HISTORY OF UTERINE SCAR FROM PREVIOUS SURGERY: Chronic | ICD-10-CM

## 2024-01-29 DIAGNOSIS — Z98.890 OTHER SPECIFIED POSTPROCEDURAL STATES: Chronic | ICD-10-CM

## 2024-01-29 LAB
ANION GAP SERPL CALC-SCNC: 6 MMOL/L — SIGNIFICANT CHANGE UP (ref 5–17)
BASOPHILS # BLD AUTO: 0.03 K/UL — SIGNIFICANT CHANGE UP (ref 0–0.2)
BASOPHILS NFR BLD AUTO: 0.6 % — SIGNIFICANT CHANGE UP (ref 0–2)
BUN SERPL-MCNC: 17 MG/DL — SIGNIFICANT CHANGE UP (ref 7–18)
CALCIUM SERPL-MCNC: 8.8 MG/DL — SIGNIFICANT CHANGE UP (ref 8.4–10.5)
CHLORIDE SERPL-SCNC: 108 MMOL/L — SIGNIFICANT CHANGE UP (ref 96–108)
CO2 SERPL-SCNC: 22 MMOL/L — SIGNIFICANT CHANGE UP (ref 22–31)
CREAT SERPL-MCNC: 0.71 MG/DL — SIGNIFICANT CHANGE UP (ref 0.5–1.3)
EGFR: 118 ML/MIN/1.73M2 — SIGNIFICANT CHANGE UP
EOSINOPHIL # BLD AUTO: 0.11 K/UL — SIGNIFICANT CHANGE UP (ref 0–0.5)
EOSINOPHIL NFR BLD AUTO: 2.1 % — SIGNIFICANT CHANGE UP (ref 0–6)
GLUCOSE SERPL-MCNC: 94 MG/DL — SIGNIFICANT CHANGE UP (ref 70–99)
HCT VFR BLD CALC: 39.9 % — SIGNIFICANT CHANGE UP (ref 34.5–45)
HGB BLD-MCNC: 12.5 G/DL — SIGNIFICANT CHANGE UP (ref 11.5–15.5)
IMM GRANULOCYTES NFR BLD AUTO: 0.8 % — SIGNIFICANT CHANGE UP (ref 0–0.9)
LYMPHOCYTES # BLD AUTO: 2.01 K/UL — SIGNIFICANT CHANGE UP (ref 1–3.3)
LYMPHOCYTES # BLD AUTO: 38.1 % — SIGNIFICANT CHANGE UP (ref 13–44)
MAGNESIUM SERPL-MCNC: 2.1 MG/DL — SIGNIFICANT CHANGE UP (ref 1.6–2.6)
MCHC RBC-ENTMCNC: 23.8 PG — LOW (ref 27–34)
MCHC RBC-ENTMCNC: 31.3 GM/DL — LOW (ref 32–36)
MCV RBC AUTO: 76 FL — LOW (ref 80–100)
MONOCYTES # BLD AUTO: 0.65 K/UL — SIGNIFICANT CHANGE UP (ref 0–0.9)
MONOCYTES NFR BLD AUTO: 12.3 % — SIGNIFICANT CHANGE UP (ref 2–14)
NEUTROPHILS # BLD AUTO: 2.43 K/UL — SIGNIFICANT CHANGE UP (ref 1.8–7.4)
NEUTROPHILS NFR BLD AUTO: 46.1 % — SIGNIFICANT CHANGE UP (ref 43–77)
NRBC # BLD: 0 /100 WBCS — SIGNIFICANT CHANGE UP (ref 0–0)
PHOSPHATE SERPL-MCNC: 3.5 MG/DL — SIGNIFICANT CHANGE UP (ref 2.5–4.5)
PLATELET # BLD AUTO: 286 K/UL — SIGNIFICANT CHANGE UP (ref 150–400)
POTASSIUM SERPL-MCNC: 4.5 MMOL/L — SIGNIFICANT CHANGE UP (ref 3.5–5.3)
POTASSIUM SERPL-SCNC: 4.5 MMOL/L — SIGNIFICANT CHANGE UP (ref 3.5–5.3)
RBC # BLD: 5.25 M/UL — HIGH (ref 3.8–5.2)
RBC # FLD: 15.7 % — HIGH (ref 10.3–14.5)
SODIUM SERPL-SCNC: 136 MMOL/L — SIGNIFICANT CHANGE UP (ref 135–145)
WBC # BLD: 5.27 K/UL — SIGNIFICANT CHANGE UP (ref 3.8–10.5)
WBC # FLD AUTO: 5.27 K/UL — SIGNIFICANT CHANGE UP (ref 3.8–10.5)

## 2024-01-29 PROCEDURE — 85025 COMPLETE CBC W/AUTO DIFF WBC: CPT

## 2024-01-29 PROCEDURE — 99284 EMERGENCY DEPT VISIT MOD MDM: CPT

## 2024-01-29 PROCEDURE — 80048 BASIC METABOLIC PNL TOTAL CA: CPT

## 2024-01-29 PROCEDURE — 36415 COLL VENOUS BLD VENIPUNCTURE: CPT

## 2024-01-29 PROCEDURE — 82962 GLUCOSE BLOOD TEST: CPT

## 2024-01-29 PROCEDURE — 99283 EMERGENCY DEPT VISIT LOW MDM: CPT

## 2024-01-29 PROCEDURE — 99053 MED SERV 10PM-8AM 24 HR FAC: CPT

## 2024-01-29 PROCEDURE — 83735 ASSAY OF MAGNESIUM: CPT

## 2024-01-29 PROCEDURE — 84100 ASSAY OF PHOSPHORUS: CPT

## 2024-01-29 RX ORDER — SPIRONOLACTONE 25 MG/1
2 TABLET, FILM COATED ORAL
Refills: 0 | DISCHARGE

## 2024-01-29 NOTE — ED ADULT TRIAGE NOTE - INTERNATIONAL TRAVEL
Quality 110: Preventive Care And Screening: Influenza Immunization: Influenza immunization was not ordered or administered, reason not given Quality 226: Preventive Care And Screening: Tobacco Use: Screening And Cessation Intervention: Patient screened for tobacco use and is an ex/non-smoker Detail Level: Detailed Quality 111:Pneumonia Vaccination Status For Older Adults: Patient received any pneumococcal conjugate or polysaccharide vaccine on or after their 60th birthday and before the end of the measurement period No

## 2024-01-29 NOTE — ED ADULT TRIAGE NOTE - CHIEF COMPLAINT QUOTE
PT REPORTS LEFT SHOULDER PAIN RADIATING DOWN TO LEFT ANKLE X 1 WEEK. NUMBNESS TO LEFT HAND SINCE 7 PM LAST NIGHT

## 2024-01-29 NOTE — ED PROVIDER NOTE - PHYSICAL EXAMINATION
GENERAL: NAD, lying in bed comfortably  HEAD:  Atraumatic, Normocephalic  EYES: EOMI, PERRLA, conjunctiva and sclera clear  ENT: Moist mucous membranes  NECK: Supple  CHEST/LUNG: CTABL; No rales, rhonchi, wheezing, or rubs. Unlabored respirations  HEART: RRR. No M/R/G  EXTREMITIES:  2+ Peripheral Pulses, brisk capillary refill. No clubbing, cyanosis, or edema  NERVOUS SYSTEM:  Alert & Oriented X3, speech clear. No deficits, CN II-XII intact. Normal sensation, reproduced sharp pain from L ankle to L buttock with tandem walking and with L straight leg raise.  MSK: L shoulder tender to palpation, reproduced sharp pain from L ankle to L buttock with tandem walking and with L straight leg raise,  no tenderness b/L lower extremities and hands, FROM all 4 extremities, full and equal strength  PSYCH: Normal affect, normal speech, normal behavior  SKIN: No rashes or lesions detailed exam

## 2024-01-29 NOTE — ED PROVIDER NOTE - PATIENT PORTAL LINK FT
You can access the FollowMyHealth Patient Portal offered by [facility] by registering at the following website: [email or web address]. By joining [employer]’s FollowMyHealth portal, you will also be able to view your health information using other applications (apps) compatible with our system.

## 2024-01-29 NOTE — ED PROVIDER NOTE - OBJECTIVE STATEMENT
27y/o F with PMH significant for chronic L shoulder pain since her  in , reports 1 week history of radiating sharp pain from L shoulder to L ankle and brief episode of L 5th finger sustained flexion with sharp pain for 2 minutes approximately 12 hours ago. Pt reports that she was feeding her son one week ago with her L leg crossed over her R knee when the radiating pain began. Pt reports that the pain is reproduced with bearing down on the toilet and intermittently when walking. Pain is sharp and ranges from 3 - 8/10. She has been taking OTC aleve and Advil Q8 hrs to help with the pain for the past 4 days. Last night at 7pm, the patient was feeding her son when she felt her L 5th finger get stuck in flexion with sharp pain. The finger was stuck in the flexed position for 2 minutes while her  massaged it. No pain, numbness, or stiffness anywhere between L hand and shoulder. Pt came to ED with concerns of stroke. No trauma, hx of similar sx, or hx of CVA.

## 2024-01-29 NOTE — ED ADULT NURSE NOTE - OBJECTIVE STATEMENT
Pt presents to the ED with c/o left shoulder pain radiating to left ankle x 1 week and left hand numbness since 7pm last night. Pt ambulatory in steady gait.

## 2024-01-29 NOTE — ED PROVIDER NOTE - CARE PROVIDER_API CALL
Gilson Cantu  Sports Medicine  [address] [facility] [address] [place] [address]  Phone: [phone]  Fax: [phone]  Follow Up Time:

## 2024-01-29 NOTE — ED ADULT TRIAGE NOTE - SOURCE OF INFORMATION
Thank you for choosing us for your  care today  If you have any questions about your ultrasound or care, please do not hesitate to contact us or your primary obstetrician  Some general instructions for your pregnancy are:     Exercise: Aim for 22 minutes per day (150 minutes per week!) of regular exercise  This is obviously hard to do during a pandemic but walking is great   Nutrition: aim for calcium-rich and iron-rich foods as well as healthy sources of protein  This will help you gain a healthy amount of weight   Protect against the flu: get yourself and your entire household vaccinated against influenza   Protect against coronavirus: practice social distancing, wear a mask in public, and wash your hands often  This virus can be spread easily by people without symptoms  Notify your primary care doctor if you have any symptoms including cough, shortness of breath or difficulty breathing, fever, chills, muscle pain, sore throat, or new loss of taste or smell   Learn about Preeclampsia: preeclampsia is a common, serious complication in pregnancy  A blood pressure of 140mmHg (top number or systolic) OR 31WYCG (bottom number or diastolic) is not normal and needs evaluation by your doctor   If you smoke, try to reduce how many cigarettes you smoke or quit completely  Do not vape   Other warning signs to watch out for in pregnancy or postpartum: chest pain, obstructed breathing or shortness of breath, seizures, thoughts of hurting yourself or your baby, bleeding, a painful or swollen leg, fever, or headache ([facility] POST-BIRTH Warning Signs campaign)  If these happen call 911  Itching is also not normal in pregnancy and if you experience this, especially over your hands and feet, potentially worse at night, notify your doctors 
Patient

## 2024-01-29 NOTE — ED PROVIDER NOTE - CLINICAL SUMMARY MEDICAL DECISION MAKING FREE TEXT BOX
Patient presenting with left shoulder pain suggestive of rotator cuff strain–she has been carrying her 1-1/2-year-old son with her left arm predominantly and has tenderness over the supraspinatus reproducing her complaint.  Her leg pain is suggestive of sciatica without evidence of an acute spinal emergency by history or exam.  Given her reported hand spasms will obtain screening labs to evaluate for electrolyte abnormalities leading to carpopedal spasm.  Otherwise if workup is unremarkable recommend follow-up as an outpatient.

## 2024-01-29 NOTE — ED ADULT NURSE NOTE - NSFALLUNIVINTERV_ED_ALL_ED
Bed/Stretcher in lowest position, wheels locked, appropriate side rails in place/Call bell, personal items and telephone in reach/Instruct patient to call for assistance before getting out of bed/chair/stretcher/Non-slip footwear applied when patient is off stretcher/[place] to call system/Physically safe environment - no spills, clutter or unnecessary equipment/Purposeful proactive rounding/Room/bathroom lighting operational, light cord in reach

## 2024-01-29 NOTE — ED PROVIDER NOTE - NSFOLLOWUPINSTRUCTIONS_ED_ALL_ED_FT
Per [facility], no new PA needed for [employer]    Thank you for choosing [facility] for your healthcare.    You were seen in the Emergency Department for pains in your shoulder and leg along with a spasming of your hand.  There was no evidence of a stroke or life-threatening cause of your symptoms at this time.  We obtained screening blood work to look for signs of electrolyte abnormality which could lead to muscle spasm and this was normal.  We suspect for your leg pain this could be due to sciatica which is a pinched nerve in the lower back which causes pain to go down the legs.  We are recommending you follow-up with a spine specialist and include the phone number for 1 of these doctors.  Your shoulder pain seems to be due to a rotator cuff injury possibly from carrying your son with this arm.  We will include the information for an orthopedist to follow-up with but it probably just needs physical therapy.  You can continue taking NSAIDs including Aleve or Advil for pain as needed at home can also try over-the-counter lidocaine patches.  Please return to the emergency room for any further emergent or concerning medical issues.

## 2024-02-06 NOTE — ED PROVIDER NOTE - NSICDXPASTMEDICALHX_GEN_ALL_CORE_FT
Problem: Safety - Adult  Goal: Free from fall injury  Outcome: Adequate for Discharge      PAST MEDICAL HISTORY:  No pertinent past medical history       Gastroenteritis      1, currently pregnant

## 2024-02-16 ENCOUNTER — APPOINTMENT (OUTPATIENT)
Dept: OBGYN | Facility: CLINIC | Age: 29
End: 2024-02-16

## 2024-03-25 NOTE — ED ADULT TRIAGE NOTE - ADDITIONAL SAFETY/BANDS...
Pt ambulatory to bathroom. Urine specimen obtained. Menstrual hygiene products provided.  
Rt at bedside attempting ABG.  
Additional Safety/Bands:

## 2024-05-29 NOTE — ED ADULT TRIAGE NOTE - BP NONINVASIVE DIASTOLIC (MM HG)
Problem: Discharge Planning  Goal: Discharge to home or other facility with appropriate resources  5/29/2024 0304 by Steffanie Barcenas RN  Outcome: Progressing  Flowsheets (Taken 5/29/2024 0304)  Discharge to home or other facility with appropriate resources: Identify barriers to discharge with patient and caregiver     Problem: Pain  Goal: Verbalizes/displays adequate comfort level or baseline comfort level  5/29/2024 0304 by Steffanie Barcenas RN  Outcome: Progressing  Flowsheets (Taken 5/29/2024 0304)  Verbalizes/displays adequate comfort level or baseline comfort level:   Encourage patient to monitor pain and request assistance   Administer analgesics based on type and severity of pain and evaluate response   Assess pain using appropriate pain scale   Implement non-pharmacological measures as appropriate and evaluate response     Problem: Pain  Goal: Verbalizes/displays adequate comfort level or baseline comfort level  5/29/2024 0304 by Steffanie Barcenas RN  Outcome: Progressing  Flowsheets (Taken 5/29/2024 0304)  Verbalizes/displays adequate comfort level or baseline comfort level:   Encourage patient to monitor pain and request assistance   Administer analgesics based on type and severity of pain and evaluate response   Assess pain using appropriate pain scale   Implement non-pharmacological measures as appropriate and evaluate response     Problem: Safety - Adult  Goal: Free from fall injury  5/29/2024 0304 by Steffanie Barcenas RN  Outcome: Progressing  Flowsheets (Taken 5/29/2024 0304)  Free From Fall Injury: Instruct family/caregiver on patient safety  Note: Patient has remained free of physical injury during this shift. Safe environment provided, call light within reach, and hourly rounding completed.      Problem: ABCDS Injury Assessment  Goal: Absence of physical injury  5/29/2024 0304 by Steffanie Barcenas RN  Outcome: Progressing  Flowsheets (Taken 5/29/2024 0304)  Absence of Physical Injury:  Implement safety measures based on patient assessment  Note: Patient has remained free of physical injury during this shift. Safe environment provided, call light within reach, and hourly rounding completed.    Care plan reviewed with patient.  Patient verbalizes understanding of the plan of care and contribute to goal setting.      83

## 2024-06-20 ENCOUNTER — EMERGENCY (EMERGENCY)
Facility: HOSPITAL | Age: 29
LOS: 1 days | Discharge: HOME | End: 2024-06-20
Attending: EMERGENCY MEDICINE
Payer: SELF-PAY

## 2024-06-20 VITALS
DIASTOLIC BLOOD PRESSURE: 82 MMHG | OXYGEN SATURATION: 99 % | WEIGHT: 134.04 LBS | TEMPERATURE: 98 F | HEIGHT: 60 IN | RESPIRATION RATE: 16 BRPM | SYSTOLIC BLOOD PRESSURE: 127 MMHG | HEART RATE: 86 BPM

## 2024-06-20 DIAGNOSIS — Z98.890 OTHER SPECIFIED POSTPROCEDURAL STATES: Chronic | ICD-10-CM

## 2024-06-20 DIAGNOSIS — Z98.891 HISTORY OF UTERINE SCAR FROM PREVIOUS SURGERY: Chronic | ICD-10-CM

## 2024-06-20 PROCEDURE — 99053 MED SERV 10PM-8AM 24 HR FAC: CPT

## 2024-06-20 PROCEDURE — 99285 EMERGENCY DEPT VISIT HI MDM: CPT

## 2024-06-20 PROCEDURE — 93010 ELECTROCARDIOGRAM REPORT: CPT

## 2024-06-20 NOTE — ED ADULT TRIAGE NOTE - WEIGHT IN KG
"Tooth Upper R side of mouth, loose, sensitive to hot and cold. Pain x 3 days. Pain is a \"10\". , can eat, is keeping him up at night. Cheek feels hot, afebrile. Does note swelling to cheek R side.     Protocol reviewed, disposition: See HCP within 4 hours (Or PCP Triage) Patient lives in a group home, will go to [facility].     CARI ELIZALDE RN  [facility] nurse advisors  9/17/2023  2:59 PM  Reason for Disposition   [1] SEVERE pain (e.g., excruciating, unable to eat, unable to do any normal activities) AND [2] not improved 2 hours after pain medicine    Additional Information   Negative: Shock suspected (e.g., cold/pale/clammy skin, too weak to stand, low BP, rapid pulse)   Negative: [1] Similar pain previously AND [2] it was from \"heart attack\"   Negative: [1] Similar pain previously AND [2] it was from \"angina\" AND [3] not relieved by nitroglycerin   Negative: Sounds like a life-threatening emergency to the triager   Negative: Chest pain   Negative: Toothache followed tooth injury   Negative: Toothache or mouth pain after tooth extraction   Negative: Canker sore (i.e., aphthous ulcer)   Negative: Tongue is very swollen and tender   Negative: [1] Face is swollen AND [2] fever   Negative: Patient sounds very sick or weak to the triager    Protocols used: Toothache-A-AH    " 60.8

## 2024-06-21 VITALS
RESPIRATION RATE: 20 BRPM | HEART RATE: 70 BPM | OXYGEN SATURATION: 100 % | DIASTOLIC BLOOD PRESSURE: 80 MMHG | SYSTOLIC BLOOD PRESSURE: 117 MMHG | TEMPERATURE: 98 F

## 2024-06-21 PROBLEM — L70.9 ACNE, UNSPECIFIED: Chronic | Status: ACTIVE | Noted: 2024-01-29

## 2024-06-21 LAB
ALBUMIN SERPL ELPH-MCNC: 3.5 G/DL — SIGNIFICANT CHANGE UP (ref 3.5–5)
ALP SERPL-CCNC: 105 U/L — SIGNIFICANT CHANGE UP (ref 40–120)
ALT FLD-CCNC: 35 U/L DA — SIGNIFICANT CHANGE UP (ref 10–60)
ANION GAP SERPL CALC-SCNC: 3 MMOL/L — LOW (ref 5–17)
AST SERPL-CCNC: 22 U/L — SIGNIFICANT CHANGE UP (ref 10–40)
BASOPHILS # BLD AUTO: 0 K/UL — SIGNIFICANT CHANGE UP (ref 0–0.2)
BASOPHILS NFR BLD AUTO: 0 % — SIGNIFICANT CHANGE UP (ref 0–2)
BILIRUB SERPL-MCNC: 0.1 MG/DL — LOW (ref 0.2–1.2)
BUN SERPL-MCNC: 16 MG/DL — SIGNIFICANT CHANGE UP (ref 7–18)
CALCIUM SERPL-MCNC: 9.4 MG/DL — SIGNIFICANT CHANGE UP (ref 8.4–10.5)
CHLORIDE SERPL-SCNC: 109 MMOL/L — HIGH (ref 96–108)
CO2 SERPL-SCNC: 27 MMOL/L — SIGNIFICANT CHANGE UP (ref 22–31)
CREAT SERPL-MCNC: 0.72 MG/DL — SIGNIFICANT CHANGE UP (ref 0.5–1.3)
D DIMER BLD IA.RAPID-MCNC: 182 NG/ML DDU — SIGNIFICANT CHANGE UP
EGFR: 116 ML/MIN/1.73M2 — SIGNIFICANT CHANGE UP
EOSINOPHIL # BLD AUTO: 0 K/UL — SIGNIFICANT CHANGE UP (ref 0–0.5)
EOSINOPHIL NFR BLD AUTO: 0 % — SIGNIFICANT CHANGE UP (ref 0–6)
GLUCOSE SERPL-MCNC: 98 MG/DL — SIGNIFICANT CHANGE UP (ref 70–99)
HCG SERPL-ACNC: <1 MIU/ML — SIGNIFICANT CHANGE UP
HCT VFR BLD CALC: 35.9 % — SIGNIFICANT CHANGE UP (ref 34.5–45)
HGB BLD-MCNC: 11.7 G/DL — SIGNIFICANT CHANGE UP (ref 11.5–15.5)
LYMPHOCYTES # BLD AUTO: 39 % — SIGNIFICANT CHANGE UP (ref 13–44)
LYMPHOCYTES # BLD AUTO: 4.61 K/UL — HIGH (ref 1–3.3)
MCHC RBC-ENTMCNC: 24.4 PG — LOW (ref 27–34)
MCHC RBC-ENTMCNC: 32.6 GM/DL — SIGNIFICANT CHANGE UP (ref 32–36)
MCV RBC AUTO: 74.8 FL — LOW (ref 80–100)
MONOCYTES # BLD AUTO: 0.83 K/UL — SIGNIFICANT CHANGE UP (ref 0–0.9)
MONOCYTES NFR BLD AUTO: 7 % — SIGNIFICANT CHANGE UP (ref 2–14)
NEUTROPHILS # BLD AUTO: 6.14 K/UL — SIGNIFICANT CHANGE UP (ref 1.8–7.4)
NEUTROPHILS NFR BLD AUTO: 52 % — SIGNIFICANT CHANGE UP (ref 43–77)
PLATELET # BLD AUTO: 315 K/UL — SIGNIFICANT CHANGE UP (ref 150–400)
POTASSIUM SERPL-MCNC: 3.5 MMOL/L — SIGNIFICANT CHANGE UP (ref 3.5–5.3)
POTASSIUM SERPL-SCNC: 3.5 MMOL/L — SIGNIFICANT CHANGE UP (ref 3.5–5.3)
PROT SERPL-MCNC: 8 G/DL — SIGNIFICANT CHANGE UP (ref 6–8.3)
RBC # BLD: 4.8 M/UL — SIGNIFICANT CHANGE UP (ref 3.8–5.2)
RBC # FLD: 14.6 % — HIGH (ref 10.3–14.5)
SODIUM SERPL-SCNC: 139 MMOL/L — SIGNIFICANT CHANGE UP (ref 135–145)
TROPONIN I, HIGH SENSITIVITY RESULT: 3 NG/L — SIGNIFICANT CHANGE UP
WBC # BLD: 11.81 K/UL — HIGH (ref 3.8–10.5)
WBC # FLD AUTO: 11.81 K/UL — HIGH (ref 3.8–10.5)

## 2024-06-21 PROCEDURE — 93005 ELECTROCARDIOGRAM TRACING: CPT

## 2024-06-21 PROCEDURE — 71045 X-RAY EXAM CHEST 1 VIEW: CPT

## 2024-06-21 PROCEDURE — 36000 PLACE NEEDLE IN VEIN: CPT

## 2024-06-21 PROCEDURE — 36415 COLL VENOUS BLD VENIPUNCTURE: CPT

## 2024-06-21 PROCEDURE — 85379 FIBRIN DEGRADATION QUANT: CPT

## 2024-06-21 PROCEDURE — 84702 CHORIONIC GONADOTROPIN TEST: CPT

## 2024-06-21 PROCEDURE — 85025 COMPLETE CBC W/AUTO DIFF WBC: CPT

## 2024-06-21 PROCEDURE — 80053 COMPREHEN METABOLIC PANEL: CPT

## 2024-06-21 PROCEDURE — 71045 X-RAY EXAM CHEST 1 VIEW: CPT | Mod: 26

## 2024-06-21 PROCEDURE — 99285 EMERGENCY DEPT VISIT HI MDM: CPT | Mod: 25

## 2024-06-21 PROCEDURE — 84484 ASSAY OF TROPONIN QUANT: CPT

## 2024-06-21 NOTE — ED PROVIDER NOTE - NSFOLLOWUPINSTRUCTIONS_ED_ALL_ED_FT
Return to ER immediately if your chest pain returns, if you have pain with radiation to arms, back or neck, if you feel short of breath, feel weak, feel fast or pounding heart beating, if you have any fever or vomiting.  If you need assistance with follow up appointments our Care Coordinator can be reached at [phone]. In addition the [facility] is located at [address] [place], [place] [id number], tel: [phone].

## 2024-06-21 NOTE — ED PROVIDER NOTE - NSFOLLOWUPCLINICS_GEN_ALL_ED_FT
[facility]  Internal Medicine  [address] [place] [address]  Phone: [phone]  Fax: [phone]

## 2024-06-21 NOTE — ED PROVIDER NOTE - OBJECTIVE STATEMENT
28-year-old female states at 11 AM yesterday she did develop left midsternal chest pain described as sharp.  No fever, no coughing, no shortness of breath, no nausea, no vomiting.  Patient states she took Diflucan neck p.o. at 930 and by 10:30 PM her chest pain resolved.  On evaluation patient is asymptomatic and very eager to be discharged.  Patient denies any calf tenderness.  Patient is not on any oral birth control.  Patient attributes chest pain from lifting 19-month-old infant frequently.

## 2024-06-21 NOTE — ED PROVIDER NOTE - CLINICAL SUMMARY MEDICAL DECISION MAKING FREE TEXT BOX
ACS ruled out as EKG has no ischemic changes or evidence of STEMI, high-sensitivity troponin is negative and patient has no cardiac risk factors.  PE ruled out as D-dimer is negative and patient has no thromboembolism risk factors.  Chest x-ray shows no pneumothorax or pulmonary pathology.   Pt is well appearing, has no new complaints and able to walk with normal gait. Pt is stable for discharge and follow up with medical doctor. Pt educated on care and need for follow up. Discussed anticipatory guidance and return precautions. Questions answered. I had a detailed discussion with the patient regarding the historical points, exam findings, and any diagnostic results supporting the discharge diagnosis.

## 2024-06-21 NOTE — ED PROVIDER NOTE - PATIENT PORTAL LINK FT
You can access the FollowMyHealth Patient Portal offered by [facility] by registering at the following website: [email or web address]. By joining [facility]’s FollowMyHealth portal, you will also be able to view your health information using other applications (apps) compatible with our system.

## 2024-10-14 ENCOUNTER — EMERGENCY (EMERGENCY)
Facility: HOSPITAL | Age: 29
LOS: 1 days | Discharge: LEFT BEFORE DISCHARGE | End: 2024-10-14
Payer: SELF-PAY

## 2024-10-14 VITALS
WEIGHT: 134.92 LBS | SYSTOLIC BLOOD PRESSURE: 122 MMHG | TEMPERATURE: 98 F | OXYGEN SATURATION: 98 % | HEART RATE: 89 BPM | DIASTOLIC BLOOD PRESSURE: 82 MMHG | RESPIRATION RATE: 18 BRPM

## 2024-10-14 DIAGNOSIS — Z98.890 OTHER SPECIFIED POSTPROCEDURAL STATES: Chronic | ICD-10-CM

## 2024-10-14 DIAGNOSIS — Z98.891 HISTORY OF UTERINE SCAR FROM PREVIOUS SURGERY: Chronic | ICD-10-CM

## 2024-10-14 PROCEDURE — L9991: CPT

## 2024-11-15 NOTE — ED PROVIDER NOTE - CROS ED ROS STATEMENT
Subjective:  Allegra is a 26 year old   is here today with the following concerns:    PCOS: she has known PCOS. Currently undergoing Letrozole ovulation induction with trigger shots at [place]. She does not need management for this but is requesting refill of Metformin at this time. Since she has been taking this, her cycles have become more regular and she wants to continue. Currently on 2000mg total per day. No concerns.  She was previously on Spironolactone but has stopped since they have been TTC x 2 years.     ROS: Pertinent ROS as above.    Medical history  OB History    Para Term  AB Living   0 0 0 0 0 0   SAB IAB Ectopic Multiple Live Births   0 0 0 0 0      No past medical history on file.   No past surgical history on file.    ALL/Meds: Her medication and allergy histories were reviewed and are documented in their appropriate chart areas.    SH: Reviewed and documented in the appropriate area of the chart.  FH:  Her family history is reviewed and updated in the chart, today.  PMH: Her past medical, surgical, and obstetric histories were reviewed and updated today in the appropriate chart areas.    Objective:  PE: /77 (BP Location: Left arm, Patient Position: Sitting, Cuff Size: Adult Regular)   Pulse 92   Wt 95.7 kg (211 lb)   LMP 2024 (Exact Date)   SpO2 99%   There is no height or weight on file to calculate BMI.    Pertinent Physical exam findings:    GENERAL: Active, alert, in no acute distress.  SKIN: Clear. No significant rash, abnormal pigmentation or lesions  MS: no gross musculoskeletal defects noted, no edema  PSYCH: Age-appropriate alertness and orientation    A/P:  Homa Law is a 26 year old  here today with the following concerns:  (E28.2) PCOS (polycystic ovarian syndrome)  (primary encounter diagnosis)  Comment: We discussed appropriate usage of medication and when/if to stop when she would conceive. Will check CMP today and she is  agreeable. Reach out for any questions or concerns. Informed that when/if she conceives she get return for prenatal care.  Plan: Comprehensive metabolic panel (BMP + Alb, Alk         Phos, ALT, AST, Total. Bili, TP), metFORMIN         (GLUCOPHAGE) 500 MG tablet          She is agreeable to the plan above and has no further questions or concerns. She did not request a chaperone for the physical exam component of the visit today.     LUCHO Fernandez CNP     all other ROS negative except as per HPI

## 2025-02-04 NOTE — DISCHARGE NOTE OB - HAVE YOU HAD COVID IN THE LAST 60 DAYS?
Skull [facility] Referrals for MRI and audiogram sent to the following via fax.     [facility] Fax: [phone]    [facility] Fax: [phone]   No

## 2025-07-07 NOTE — ED ADULT TRIAGE NOTE - CHIEF COMPLAINT QUOTE
D/w ACP F/U Pulm recs for etiology of profound hypoxia  Concern for ASD per outpt recs and CT PE negative along with D-Dimer but has Mixing artifact throughout the right and left main pulmonary   artery and distal vessels on initial CTPE (repeat still no PE) D/w ACP time spent reviewing prior charts, meds, discussing plan with patient= 51 minutes    d/w ACP Elida D/w ACP Alysia D/w YUAN Cook BUMPS AND DISCOLORATION TO BODY FOR 2 WEEKS WAS HERE ON WEDNESDAY SEEN AND DISCHARGE WITH CREAM AS PER PATIENT NO RELIEF, RECENTLY RECEIVED 2 DOSE PFIZER D/w ACP D/w YUAN Lyn

## 2025-07-21 NOTE — ED PROVIDER NOTE - PHYSICAL EXAMINATION
Wt Readings from Last 3 Encounters:   07/21/25 (!) 149 kg (328 lb)   04/10/25 (!) 148 kg (326 lb 6.4 oz)   03/04/25 (!) 150 kg (330 lb)   blood pressure stable   continue Norvasc 10 mg daily  continue Coreg 25 mg BID  continue clonidine 0.2 mg BID  continue lisinopril 40 mg once a day  continue Aldactone 25 mg daily    Orders:    POCT ECG    
Afebrile, hemodynamically stable, saturating well  NAD, well appearing, sitting comfortably in chair, no WOB, speaking full sentences  Head NCAT  EOMI grossly, anicteric  MMM  No JVD  RRR, nml S1/S2, no m/r/g  Lungs CTAB, no w/r/r  Abd soft, NT, ND, nml BS, no rebound or guarding  AAO, CN's 3-12 grossly intact  PONCE spontaneously, no leg cyanosis or edema  Skin warm, dry, slight punctate raised papular skin-color irritation to skin with excoriations, no webspace findings